# Patient Record
Sex: FEMALE | Race: WHITE | NOT HISPANIC OR LATINO | Employment: OTHER | URBAN - METROPOLITAN AREA
[De-identification: names, ages, dates, MRNs, and addresses within clinical notes are randomized per-mention and may not be internally consistent; named-entity substitution may affect disease eponyms.]

---

## 2017-03-14 ENCOUNTER — APPOINTMENT (OUTPATIENT)
Dept: GENERAL RADIOLOGY | Facility: HOSPITAL | Age: 82
End: 2017-03-14

## 2017-03-14 LAB
ALBUMIN SERPL-MCNC: 3.8 G/DL (ref 3.5–5.2)
ALBUMIN/GLOB SERPL: 1 G/DL
ALP SERPL-CCNC: 95 U/L (ref 39–117)
ALT SERPL W P-5'-P-CCNC: 10 U/L (ref 1–33)
ANION GAP SERPL CALCULATED.3IONS-SCNC: 15.1 MMOL/L
AST SERPL-CCNC: 27 U/L (ref 1–32)
BACTERIA UR QL AUTO: ABNORMAL /HPF
BASOPHILS # BLD AUTO: 0.03 10*3/MM3 (ref 0–0.2)
BASOPHILS NFR BLD AUTO: 0.3 % (ref 0–1.5)
BILIRUB SERPL-MCNC: 0.3 MG/DL (ref 0.1–1.2)
BILIRUB UR QL STRIP: NEGATIVE
BUN BLD-MCNC: 13 MG/DL (ref 8–23)
BUN/CREAT SERPL: 14.4 (ref 7–25)
CALCIUM SPEC-SCNC: 9.7 MG/DL (ref 8.2–9.6)
CHLORIDE SERPL-SCNC: 97 MMOL/L (ref 98–107)
CLARITY UR: ABNORMAL
CO2 SERPL-SCNC: 27.9 MMOL/L (ref 22–29)
COLOR UR: YELLOW
CREAT BLD-MCNC: 0.9 MG/DL (ref 0.57–1)
DEPRECATED RDW RBC AUTO: 50.9 FL (ref 37–54)
EOSINOPHIL # BLD AUTO: 0.23 10*3/MM3 (ref 0–0.7)
EOSINOPHIL NFR BLD AUTO: 2.4 % (ref 0.3–6.2)
ERYTHROCYTE [DISTWIDTH] IN BLOOD BY AUTOMATED COUNT: 14.6 % (ref 11.7–13)
GFR SERPL CREATININE-BSD FRML MDRD: 58 ML/MIN/1.73
GLOBULIN UR ELPH-MCNC: 3.8 GM/DL
GLUCOSE BLD-MCNC: 113 MG/DL (ref 65–99)
GLUCOSE UR STRIP-MCNC: NEGATIVE MG/DL
HCT VFR BLD AUTO: 36.7 % (ref 35.6–45.5)
HGB BLD-MCNC: 12.2 G/DL (ref 11.9–15.5)
HGB UR QL STRIP.AUTO: NEGATIVE
HYALINE CASTS UR QL AUTO: ABNORMAL /LPF
IMM GRANULOCYTES # BLD: 0.02 10*3/MM3 (ref 0–0.03)
IMM GRANULOCYTES NFR BLD: 0.2 % (ref 0–0.5)
KETONES UR QL STRIP: NEGATIVE
LEUKOCYTE ESTERASE UR QL STRIP.AUTO: ABNORMAL
LYMPHOCYTES # BLD AUTO: 1.94 10*3/MM3 (ref 0.9–4.8)
LYMPHOCYTES NFR BLD AUTO: 20 % (ref 19.6–45.3)
MAGNESIUM SERPL-MCNC: 1.8 MG/DL (ref 1.7–2.3)
MCH RBC QN AUTO: 31.8 PG (ref 26.9–32)
MCHC RBC AUTO-ENTMCNC: 33.2 G/DL (ref 32.4–36.3)
MCV RBC AUTO: 95.6 FL (ref 80.5–98.2)
MONOCYTES # BLD AUTO: 0.76 10*3/MM3 (ref 0.2–1.2)
MONOCYTES NFR BLD AUTO: 7.9 % (ref 5–12)
NEUTROPHILS # BLD AUTO: 6.7 10*3/MM3 (ref 1.9–8.1)
NEUTROPHILS NFR BLD AUTO: 69.2 % (ref 42.7–76)
NITRITE UR QL STRIP: NEGATIVE
PH UR STRIP.AUTO: 6 [PH] (ref 5–8)
PLATELET # BLD AUTO: 163 10*3/MM3 (ref 140–500)
PMV BLD AUTO: 9.7 FL (ref 6–12)
POTASSIUM BLD-SCNC: 3.1 MMOL/L (ref 3.5–5.2)
PROT SERPL-MCNC: 7.6 G/DL (ref 6–8.5)
PROT UR QL STRIP: NEGATIVE
RBC # BLD AUTO: 3.84 10*6/MM3 (ref 3.9–5.2)
RBC # UR: ABNORMAL /HPF
REF LAB TEST METHOD: ABNORMAL
SODIUM BLD-SCNC: 140 MMOL/L (ref 136–145)
SP GR UR STRIP: 1.02 (ref 1–1.03)
SQUAMOUS #/AREA URNS HPF: ABNORMAL /HPF
TROPONIN T SERPL-MCNC: <0.01 NG/ML (ref 0–0.03)
UROBILINOGEN UR QL STRIP: ABNORMAL
WBC NRBC COR # BLD: 9.68 10*3/MM3 (ref 4.5–10.7)
WBC UR QL AUTO: ABNORMAL /HPF

## 2017-03-14 PROCEDURE — 85025 COMPLETE CBC W/AUTO DIFF WBC: CPT | Performed by: EMERGENCY MEDICINE

## 2017-03-14 PROCEDURE — 99285 EMERGENCY DEPT VISIT HI MDM: CPT

## 2017-03-14 PROCEDURE — 93010 ELECTROCARDIOGRAM REPORT: CPT | Performed by: INTERNAL MEDICINE

## 2017-03-14 PROCEDURE — 80053 COMPREHEN METABOLIC PANEL: CPT | Performed by: EMERGENCY MEDICINE

## 2017-03-14 PROCEDURE — 84484 ASSAY OF TROPONIN QUANT: CPT | Performed by: EMERGENCY MEDICINE

## 2017-03-14 PROCEDURE — 87086 URINE CULTURE/COLONY COUNT: CPT | Performed by: EMERGENCY MEDICINE

## 2017-03-14 PROCEDURE — 93005 ELECTROCARDIOGRAM TRACING: CPT

## 2017-03-14 PROCEDURE — 81001 URINALYSIS AUTO W/SCOPE: CPT | Performed by: EMERGENCY MEDICINE

## 2017-03-14 PROCEDURE — 36415 COLL VENOUS BLD VENIPUNCTURE: CPT | Performed by: EMERGENCY MEDICINE

## 2017-03-14 PROCEDURE — 83735 ASSAY OF MAGNESIUM: CPT | Performed by: EMERGENCY MEDICINE

## 2017-03-14 PROCEDURE — 71010 HC CHEST PA OR AP: CPT

## 2017-03-14 RX ORDER — SODIUM CHLORIDE 0.9 % (FLUSH) 0.9 %
10 SYRINGE (ML) INJECTION AS NEEDED
Status: DISCONTINUED | OUTPATIENT
Start: 2017-03-14 | End: 2017-03-18 | Stop reason: HOSPADM

## 2017-03-14 RX ORDER — LATANOPROST 50 UG/ML
1 SOLUTION/ DROPS OPHTHALMIC NIGHTLY
COMMUNITY

## 2017-03-15 ENCOUNTER — APPOINTMENT (OUTPATIENT)
Dept: CARDIOLOGY | Facility: HOSPITAL | Age: 82
End: 2017-03-15
Attending: HOSPITALIST

## 2017-03-15 ENCOUNTER — HOSPITAL ENCOUNTER (INPATIENT)
Facility: HOSPITAL | Age: 82
LOS: 3 days | Discharge: NURSING, REHAB OR LONG-TERM CARE | End: 2017-03-18
Attending: EMERGENCY MEDICINE | Admitting: HOSPITALIST

## 2017-03-15 ENCOUNTER — APPOINTMENT (OUTPATIENT)
Dept: GENERAL RADIOLOGY | Facility: HOSPITAL | Age: 82
End: 2017-03-15
Attending: HOSPITALIST

## 2017-03-15 ENCOUNTER — APPOINTMENT (OUTPATIENT)
Dept: CT IMAGING | Facility: HOSPITAL | Age: 82
End: 2017-03-15

## 2017-03-15 DIAGNOSIS — R26.2 DIFFICULTY WALKING: ICD-10-CM

## 2017-03-15 DIAGNOSIS — E87.6 HYPOKALEMIA: ICD-10-CM

## 2017-03-15 DIAGNOSIS — N39.0 ACUTE UTI: ICD-10-CM

## 2017-03-15 DIAGNOSIS — G93.41 ACUTE METABOLIC ENCEPHALOPATHY: Primary | ICD-10-CM

## 2017-03-15 PROBLEM — R94.31 ST SEGMENT DEPRESSION: Status: ACTIVE | Noted: 2017-03-15

## 2017-03-15 PROBLEM — I50.32 CHRONIC DIASTOLIC HF (HEART FAILURE) (HCC): Status: ACTIVE | Noted: 2017-03-15

## 2017-03-15 PROBLEM — R07.9 CHEST PAIN: Status: ACTIVE | Noted: 2017-03-15

## 2017-03-15 PROBLEM — I10 HTN (HYPERTENSION): Status: ACTIVE | Noted: 2017-03-15

## 2017-03-15 PROBLEM — E78.5 HLD (HYPERLIPIDEMIA): Status: ACTIVE | Noted: 2017-03-15

## 2017-03-15 PROBLEM — E83.42 HYPOMAGNESEMIA: Status: ACTIVE | Noted: 2017-03-15

## 2017-03-15 LAB
ANION GAP SERPL CALCULATED.3IONS-SCNC: 14.9 MMOL/L
BASOPHILS # BLD AUTO: 0.02 10*3/MM3 (ref 0–0.2)
BASOPHILS NFR BLD AUTO: 0.2 % (ref 0–1.5)
BH CV ECHO MEAS - ACS: 0.5 CM
BH CV ECHO MEAS - AI DEC SLOPE: 513.5 CM/SEC^2
BH CV ECHO MEAS - AI MAX PG: 68.6 MMHG
BH CV ECHO MEAS - AI MAX VEL: 414 CM/SEC
BH CV ECHO MEAS - AI P1/2T: 236.1 MSEC
BH CV ECHO MEAS - AO MEAN PG (FULL): 28 MMHG
BH CV ECHO MEAS - AO MEAN PG: 31 MMHG
BH CV ECHO MEAS - AO ROOT AREA (BSA CORRECTED): 1.6
BH CV ECHO MEAS - AO ROOT AREA: 4.5 CM^2
BH CV ECHO MEAS - AO ROOT DIAM: 2.4 CM
BH CV ECHO MEAS - AO V2 MAX: 353 CM/SEC
BH CV ECHO MEAS - AO V2 MEAN: 264 CM/SEC
BH CV ECHO MEAS - AO V2 VTI: 82.5 CM
BH CV ECHO MEAS - AVA(I,A): 0.97 CM^2
BH CV ECHO MEAS - AVA(I,D): 0.97 CM^2
BH CV ECHO MEAS - BSA(HAYCOCK): 1.5 M^2
BH CV ECHO MEAS - BSA: 1.5 M^2
BH CV ECHO MEAS - BZI_BMI: 22.3 KILOGRAMS/M^2
BH CV ECHO MEAS - BZI_METRIC_HEIGHT: 152.4 CM
BH CV ECHO MEAS - BZI_METRIC_WEIGHT: 51.7 KG
BH CV ECHO MEAS - CONTRAST EF (2CH): 58.5 ML/M^2
BH CV ECHO MEAS - CONTRAST EF 4CH: 59.3 ML/M^2
BH CV ECHO MEAS - EDV(CUBED): 19.7 ML
BH CV ECHO MEAS - EDV(MOD-SP2): 41 ML
BH CV ECHO MEAS - EDV(MOD-SP4): 54 ML
BH CV ECHO MEAS - EDV(TEICH): 27 ML
BH CV ECHO MEAS - EF(CUBED): 59.4 %
BH CV ECHO MEAS - EF(MOD-SP2): 58.5 %
BH CV ECHO MEAS - EF(MOD-SP4): 59.3 %
BH CV ECHO MEAS - EF(TEICH): 52.9 %
BH CV ECHO MEAS - ESV(CUBED): 8 ML
BH CV ECHO MEAS - ESV(MOD-SP2): 17 ML
BH CV ECHO MEAS - ESV(MOD-SP4): 22 ML
BH CV ECHO MEAS - ESV(TEICH): 12.7 ML
BH CV ECHO MEAS - FS: 25.9 %
BH CV ECHO MEAS - IVS/LVPW: 1
BH CV ECHO MEAS - IVSD: 1.1 CM
BH CV ECHO MEAS - LAT PEAK E' VEL: 6 CM/SEC
BH CV ECHO MEAS - LV DIASTOLIC VOL/BSA (35-75): 36.7 ML/M^2
BH CV ECHO MEAS - LV MASS(C)D: 82.1 GRAMS
BH CV ECHO MEAS - LV MASS(C)DI: 55.9 GRAMS/M^2
BH CV ECHO MEAS - LV MEAN PG: 3 MMHG
BH CV ECHO MEAS - LV SYSTOLIC VOL/BSA (12-30): 15 ML/M^2
BH CV ECHO MEAS - LV V1 MAX: 118 CM/SEC
BH CV ECHO MEAS - LV V1 MEAN: 77.1 CM/SEC
BH CV ECHO MEAS - LV V1 VTI: 25.4 CM
BH CV ECHO MEAS - LVIDD: 2.7 CM
BH CV ECHO MEAS - LVIDS: 2 CM
BH CV ECHO MEAS - LVLD AP2: 7.3 CM
BH CV ECHO MEAS - LVLD AP4: 6.9 CM
BH CV ECHO MEAS - LVLS AP2: 6 CM
BH CV ECHO MEAS - LVLS AP4: 5.8 CM
BH CV ECHO MEAS - LVOT AREA (M): 3.1 CM^2
BH CV ECHO MEAS - LVOT AREA: 3.1 CM^2
BH CV ECHO MEAS - LVOT DIAM: 2 CM
BH CV ECHO MEAS - LVPWD: 1.1 CM
BH CV ECHO MEAS - MED PEAK E' VEL: 9 CM/SEC
BH CV ECHO MEAS - MR MAX PG: 198 MMHG
BH CV ECHO MEAS - MR MAX VEL: 703.5 CM/SEC
BH CV ECHO MEAS - MV A DUR: 0.11 SEC
BH CV ECHO MEAS - MV A MAX VEL: 149 CM/SEC
BH CV ECHO MEAS - MV DEC SLOPE: 1106 CM/SEC^2
BH CV ECHO MEAS - MV DEC TIME: 0.09 SEC
BH CV ECHO MEAS - MV E MAX VEL: 78.4 CM/SEC
BH CV ECHO MEAS - MV E/A: 0.53
BH CV ECHO MEAS - MV MEAN PG: 3 MMHG
BH CV ECHO MEAS - MV P1/2T MAX VEL: 110 CM/SEC
BH CV ECHO MEAS - MV P1/2T: 29.1 MSEC
BH CV ECHO MEAS - MV V2 MEAN: 80.7 CM/SEC
BH CV ECHO MEAS - MV V2 VTI: 25.4 CM
BH CV ECHO MEAS - MVA P1/2T LCG: 2 CM^2
BH CV ECHO MEAS - MVA(P1/2T): 7.6 CM^2
BH CV ECHO MEAS - MVA(VTI): 3.1 CM^2
BH CV ECHO MEAS - PA ACC SLOPE: 11.1 CM/SEC^2
BH CV ECHO MEAS - PA ACC TIME: 0.11 SEC
BH CV ECHO MEAS - PA MAX PG (FULL): 1.4 MMHG
BH CV ECHO MEAS - PA MAX PG: 3.6 MMHG
BH CV ECHO MEAS - PA PR(ACCEL): 30 MMHG
BH CV ECHO MEAS - PA V2 MAX: 95.3 CM/SEC
BH CV ECHO MEAS - PULM A REVS DUR: 0.09 SEC
BH CV ECHO MEAS - PULM A REVS VEL: 35.8 CM/SEC
BH CV ECHO MEAS - PULM DIAS VEL: 51.8 CM/SEC
BH CV ECHO MEAS - PULM S/D: 1.4
BH CV ECHO MEAS - PULM SYS VEL: 73.4 CM/SEC
BH CV ECHO MEAS - PVA(V,A): 1.8 CM^2
BH CV ECHO MEAS - PVA(V,D): 1.8 CM^2
BH CV ECHO MEAS - QP/QS: 0.47
BH CV ECHO MEAS - RAP SYSTOLE: 8 MMHG
BH CV ECHO MEAS - RV MAX PG: 2.2 MMHG
BH CV ECHO MEAS - RV MEAN PG: 1 MMHG
BH CV ECHO MEAS - RV V1 MAX: 74.7 CM/SEC
BH CV ECHO MEAS - RV V1 MEAN: 48.1 CM/SEC
BH CV ECHO MEAS - RV V1 VTI: 16.4 CM
BH CV ECHO MEAS - RVOT AREA: 2.3 CM^2
BH CV ECHO MEAS - RVOT DIAM: 1.7 CM
BH CV ECHO MEAS - RVSP: 68 MMHG
BH CV ECHO MEAS - SI(AO): 253.9 ML/M^2
BH CV ECHO MEAS - SI(CUBED): 7.9 ML/M^2
BH CV ECHO MEAS - SI(LVOT): 54.3 ML/M^2
BH CV ECHO MEAS - SI(MOD-SP2): 16.3 ML/M^2
BH CV ECHO MEAS - SI(MOD-SP4): 21.8 ML/M^2
BH CV ECHO MEAS - SI(TEICH): 9.7 ML/M^2
BH CV ECHO MEAS - SV(AO): 373.2 ML
BH CV ECHO MEAS - SV(CUBED): 11.7 ML
BH CV ECHO MEAS - SV(LVOT): 79.8 ML
BH CV ECHO MEAS - SV(MOD-SP2): 24 ML
BH CV ECHO MEAS - SV(MOD-SP4): 32 ML
BH CV ECHO MEAS - SV(RVOT): 37.2 ML
BH CV ECHO MEAS - SV(TEICH): 14.3 ML
BH CV ECHO MEAS - TAPSE (>1.6): 2.5 CM2
BH CV ECHO MEAS - TR MAX VEL: 388 CM/SEC
BH CV XLRA - RV BASE: 2.6 CM
BH CV XLRA - TDI S': 14 CM/SEC
BUN BLD-MCNC: 11 MG/DL (ref 8–23)
BUN/CREAT SERPL: 13.8 (ref 7–25)
CALCIUM SPEC-SCNC: 9.5 MG/DL (ref 8.2–9.6)
CHLORIDE SERPL-SCNC: 100 MMOL/L (ref 98–107)
CK MB SERPL-CCNC: 1.84 NG/ML
CK SERPL-CCNC: 55 U/L (ref 20–180)
CO2 SERPL-SCNC: 26.1 MMOL/L (ref 22–29)
CREAT BLD-MCNC: 0.8 MG/DL (ref 0.57–1)
DEPRECATED RDW RBC AUTO: 50.7 FL (ref 37–54)
E/E' RATIO: 11
EOSINOPHIL # BLD AUTO: 0.15 10*3/MM3 (ref 0–0.7)
EOSINOPHIL NFR BLD AUTO: 1.5 % (ref 0.3–6.2)
ERYTHROCYTE [DISTWIDTH] IN BLOOD BY AUTOMATED COUNT: 14.6 % (ref 11.7–13)
GFR SERPL CREATININE-BSD FRML MDRD: 67 ML/MIN/1.73
GLUCOSE BLD-MCNC: 147 MG/DL (ref 65–99)
GLUCOSE BLDC GLUCOMTR-MCNC: 113 MG/DL (ref 70–130)
GLUCOSE BLDC GLUCOMTR-MCNC: 119 MG/DL (ref 70–130)
GLUCOSE BLDC GLUCOMTR-MCNC: 128 MG/DL (ref 70–130)
GLUCOSE BLDC GLUCOMTR-MCNC: 154 MG/DL (ref 70–130)
HCT VFR BLD AUTO: 36.1 % (ref 35.6–45.5)
HGB BLD-MCNC: 12.2 G/DL (ref 11.9–15.5)
HOLD SPECIMEN: NORMAL
IMM GRANULOCYTES # BLD: 0.04 10*3/MM3 (ref 0–0.03)
IMM GRANULOCYTES NFR BLD: 0.4 % (ref 0–0.5)
LEFT ATRIUM VOLUME INDEX: 11 ML/M2
LYMPHOCYTES # BLD AUTO: 3.13 10*3/MM3 (ref 0.9–4.8)
LYMPHOCYTES NFR BLD AUTO: 30.9 % (ref 19.6–45.3)
MAGNESIUM SERPL-MCNC: 1.6 MG/DL (ref 1.7–2.3)
MCH RBC QN AUTO: 32.4 PG (ref 26.9–32)
MCHC RBC AUTO-ENTMCNC: 33.8 G/DL (ref 32.4–36.3)
MCV RBC AUTO: 96 FL (ref 80.5–98.2)
MONOCYTES # BLD AUTO: 0.77 10*3/MM3 (ref 0.2–1.2)
MONOCYTES NFR BLD AUTO: 7.6 % (ref 5–12)
NEUTROPHILS # BLD AUTO: 6.03 10*3/MM3 (ref 1.9–8.1)
NEUTROPHILS NFR BLD AUTO: 59.4 % (ref 42.7–76)
NT-PROBNP SERPL-MCNC: 574 PG/ML (ref 0–1800)
PLATELET # BLD AUTO: 136 10*3/MM3 (ref 140–500)
PMV BLD AUTO: 10 FL (ref 6–12)
POTASSIUM BLD-SCNC: 3.3 MMOL/L (ref 3.5–5.2)
POTASSIUM BLD-SCNC: 3.3 MMOL/L (ref 3.5–5.2)
RBC # BLD AUTO: 3.76 10*6/MM3 (ref 3.9–5.2)
SODIUM BLD-SCNC: 141 MMOL/L (ref 136–145)
TROPONIN T SERPL-MCNC: <0.01 NG/ML (ref 0–0.03)
TROPONIN T SERPL-MCNC: <0.01 NG/ML (ref 0–0.03)
WBC NRBC COR # BLD: 10.14 10*3/MM3 (ref 4.5–10.7)
WHOLE BLOOD HOLD SPECIMEN: NORMAL

## 2017-03-15 PROCEDURE — 71010 HC CHEST PA OR AP: CPT

## 2017-03-15 PROCEDURE — 84484 ASSAY OF TROPONIN QUANT: CPT | Performed by: HOSPITALIST

## 2017-03-15 PROCEDURE — 93005 ELECTROCARDIOGRAM TRACING: CPT | Performed by: HOSPITALIST

## 2017-03-15 PROCEDURE — 93010 ELECTROCARDIOGRAM REPORT: CPT | Performed by: INTERNAL MEDICINE

## 2017-03-15 PROCEDURE — 82553 CREATINE MB FRACTION: CPT | Performed by: HOSPITALIST

## 2017-03-15 PROCEDURE — 25010000002 HYDRALAZINE PER 20 MG: Performed by: HOSPITALIST

## 2017-03-15 PROCEDURE — 82962 GLUCOSE BLOOD TEST: CPT

## 2017-03-15 PROCEDURE — 93306 TTE W/DOPPLER COMPLETE: CPT

## 2017-03-15 PROCEDURE — 25010000002 MAGNESIUM SULFATE IN D5W 1G/100ML (PREMIX) 10-5 MG/ML-% SOLUTION: Performed by: HOSPITALIST

## 2017-03-15 PROCEDURE — 25010000003 CEFTRIAXONE PER 250 MG: Performed by: EMERGENCY MEDICINE

## 2017-03-15 PROCEDURE — 83880 ASSAY OF NATRIURETIC PEPTIDE: CPT | Performed by: HOSPITALIST

## 2017-03-15 PROCEDURE — 99222 1ST HOSP IP/OBS MODERATE 55: CPT | Performed by: INTERNAL MEDICINE

## 2017-03-15 PROCEDURE — 85025 COMPLETE CBC W/AUTO DIFF WBC: CPT | Performed by: HOSPITALIST

## 2017-03-15 PROCEDURE — 80048 BASIC METABOLIC PNL TOTAL CA: CPT | Performed by: HOSPITALIST

## 2017-03-15 PROCEDURE — 93306 TTE W/DOPPLER COMPLETE: CPT | Performed by: INTERNAL MEDICINE

## 2017-03-15 PROCEDURE — 82550 ASSAY OF CK (CPK): CPT | Performed by: HOSPITALIST

## 2017-03-15 PROCEDURE — 84132 ASSAY OF SERUM POTASSIUM: CPT | Performed by: HOSPITALIST

## 2017-03-15 PROCEDURE — 70450 CT HEAD/BRAIN W/O DYE: CPT

## 2017-03-15 PROCEDURE — 83735 ASSAY OF MAGNESIUM: CPT | Performed by: HOSPITALIST

## 2017-03-15 RX ORDER — SODIUM CHLORIDE 0.9 % (FLUSH) 0.9 %
1-10 SYRINGE (ML) INJECTION AS NEEDED
Status: DISCONTINUED | OUTPATIENT
Start: 2017-03-15 | End: 2017-03-18 | Stop reason: HOSPADM

## 2017-03-15 RX ORDER — CEFTRIAXONE SODIUM 1 G/50ML
1 INJECTION, SOLUTION INTRAVENOUS ONCE
Status: COMPLETED | OUTPATIENT
Start: 2017-03-15 | End: 2017-03-15

## 2017-03-15 RX ORDER — MORPHINE SULFATE 2 MG/ML
1 INJECTION, SOLUTION INTRAMUSCULAR; INTRAVENOUS EVERY 4 HOURS PRN
Status: DISCONTINUED | OUTPATIENT
Start: 2017-03-15 | End: 2017-03-18 | Stop reason: HOSPADM

## 2017-03-15 RX ORDER — ASPIRIN 81 MG/1
81 TABLET ORAL DAILY
Status: DISCONTINUED | OUTPATIENT
Start: 2017-03-15 | End: 2017-03-18 | Stop reason: HOSPADM

## 2017-03-15 RX ORDER — SODIUM CHLORIDE 9 MG/ML
125 INJECTION, SOLUTION INTRAVENOUS CONTINUOUS
Status: DISCONTINUED | OUTPATIENT
Start: 2017-03-15 | End: 2017-03-16

## 2017-03-15 RX ORDER — LATANOPROST 50 UG/ML
1 SOLUTION/ DROPS OPHTHALMIC NIGHTLY
Status: DISCONTINUED | OUTPATIENT
Start: 2017-03-15 | End: 2017-03-18 | Stop reason: HOSPADM

## 2017-03-15 RX ORDER — NITROGLYCERIN 0.4 MG/1
TABLET SUBLINGUAL
Status: COMPLETED
Start: 2017-03-15 | End: 2017-03-15

## 2017-03-15 RX ORDER — ONDANSETRON 2 MG/ML
4 INJECTION INTRAMUSCULAR; INTRAVENOUS EVERY 6 HOURS PRN
Status: DISCONTINUED | OUTPATIENT
Start: 2017-03-15 | End: 2017-03-18 | Stop reason: HOSPADM

## 2017-03-15 RX ORDER — POTASSIUM CHLORIDE 750 MG/1
40 CAPSULE, EXTENDED RELEASE ORAL ONCE
Status: COMPLETED | OUTPATIENT
Start: 2017-03-15 | End: 2017-03-15

## 2017-03-15 RX ORDER — ONDANSETRON 4 MG/1
4 TABLET, ORALLY DISINTEGRATING ORAL EVERY 6 HOURS PRN
Status: DISCONTINUED | OUTPATIENT
Start: 2017-03-15 | End: 2017-03-18 | Stop reason: HOSPADM

## 2017-03-15 RX ORDER — ALBUTEROL SULFATE 2.5 MG/3ML
2.5 SOLUTION RESPIRATORY (INHALATION) EVERY 6 HOURS PRN
Status: DISCONTINUED | OUTPATIENT
Start: 2017-03-15 | End: 2017-03-18 | Stop reason: HOSPADM

## 2017-03-15 RX ORDER — METOPROLOL TARTRATE 50 MG/1
50 TABLET, FILM COATED ORAL EVERY 12 HOURS SCHEDULED
Status: DISCONTINUED | OUTPATIENT
Start: 2017-03-15 | End: 2017-03-18 | Stop reason: HOSPADM

## 2017-03-15 RX ORDER — NITROGLYCERIN 0.4 MG/1
0.4 TABLET SUBLINGUAL
Status: DISCONTINUED | OUTPATIENT
Start: 2017-03-15 | End: 2017-03-18 | Stop reason: HOSPADM

## 2017-03-15 RX ORDER — ACETAMINOPHEN 325 MG/1
650 TABLET ORAL EVERY 4 HOURS PRN
Status: DISCONTINUED | OUTPATIENT
Start: 2017-03-15 | End: 2017-03-18 | Stop reason: HOSPADM

## 2017-03-15 RX ORDER — METOPROLOL TARTRATE 5 MG/5ML
INJECTION INTRAVENOUS
Status: COMPLETED
Start: 2017-03-15 | End: 2017-03-15

## 2017-03-15 RX ORDER — ONDANSETRON 4 MG/1
4 TABLET, FILM COATED ORAL EVERY 6 HOURS PRN
Status: DISCONTINUED | OUTPATIENT
Start: 2017-03-15 | End: 2017-03-18 | Stop reason: HOSPADM

## 2017-03-15 RX ORDER — ALLOPURINOL 100 MG/1
100 TABLET ORAL DAILY
Status: DISCONTINUED | OUTPATIENT
Start: 2017-03-15 | End: 2017-03-18 | Stop reason: HOSPADM

## 2017-03-15 RX ORDER — CEFTRIAXONE SODIUM 1 G/50ML
1 INJECTION, SOLUTION INTRAVENOUS EVERY 24 HOURS
Status: DISCONTINUED | OUTPATIENT
Start: 2017-03-16 | End: 2017-03-18 | Stop reason: HOSPADM

## 2017-03-15 RX ORDER — HYDRALAZINE HYDROCHLORIDE 20 MG/ML
20 INJECTION INTRAMUSCULAR; INTRAVENOUS EVERY 6 HOURS PRN
Status: DISCONTINUED | OUTPATIENT
Start: 2017-03-15 | End: 2017-03-15

## 2017-03-15 RX ORDER — POTASSIUM CHLORIDE 1.5 G/1.77G
40 POWDER, FOR SOLUTION ORAL ONCE
Status: DISCONTINUED | OUTPATIENT
Start: 2017-03-15 | End: 2017-03-18 | Stop reason: HOSPADM

## 2017-03-15 RX ADMIN — MAGNESIUM SULFATE HEPTAHYDRATE 1 G: 1 INJECTION, SOLUTION INTRAVENOUS at 11:01

## 2017-03-15 RX ADMIN — NITROGLYCERIN 1 INCH: 20 OINTMENT TOPICAL at 15:12

## 2017-03-15 RX ADMIN — HYDRALAZINE HYDROCHLORIDE 20 MG: 20 INJECTION INTRAMUSCULAR; INTRAVENOUS at 07:10

## 2017-03-15 RX ADMIN — METOPROLOL TARTRATE 50 MG: 50 TABLET ORAL at 21:58

## 2017-03-15 RX ADMIN — SODIUM CHLORIDE 500 ML: 9 INJECTION, SOLUTION INTRAVENOUS at 03:39

## 2017-03-15 RX ADMIN — METOROPROLOL TARTRATE 5 MG: 5 INJECTION, SOLUTION INTRAVENOUS at 08:18

## 2017-03-15 RX ADMIN — NITROGLYCERIN 0.4 MG: 0.4 TABLET SUBLINGUAL at 07:57

## 2017-03-15 RX ADMIN — POTASSIUM CHLORIDE 40 MEQ: 750 CAPSULE, EXTENDED RELEASE ORAL at 03:35

## 2017-03-15 RX ADMIN — CEFTRIAXONE SODIUM 1 G: 1 INJECTION, SOLUTION INTRAVENOUS at 03:39

## 2017-03-15 RX ADMIN — ASPIRIN 81 MG: 81 TABLET ORAL at 15:12

## 2017-03-15 RX ADMIN — METOPROLOL TARTRATE 25 MG: 25 TABLET ORAL at 11:45

## 2017-03-15 RX ADMIN — ALLOPURINOL 100 MG: 100 TABLET ORAL at 15:12

## 2017-03-15 RX ADMIN — NITROGLYCERIN 0.4 MG: 0.4 TABLET SUBLINGUAL at 08:03

## 2017-03-15 RX ADMIN — METOPROLOL TARTRATE 5 MG: 5 INJECTION INTRAVENOUS at 11:56

## 2017-03-15 RX ADMIN — SODIUM CHLORIDE 125 ML/HR: 9 INJECTION, SOLUTION INTRAVENOUS at 09:16

## 2017-03-15 RX ADMIN — NITROGLYCERIN 1 INCH: 20 OINTMENT TOPICAL at 21:58

## 2017-03-15 RX ADMIN — SODIUM CHLORIDE 125 ML/HR: 9 INJECTION, SOLUTION INTRAVENOUS at 03:40

## 2017-03-15 RX ADMIN — POTASSIUM CHLORIDE 40 MEQ: 750 CAPSULE, EXTENDED RELEASE ORAL at 11:12

## 2017-03-15 NOTE — ED PROVIDER NOTES
EMERGENCY DEPARTMENT ENCOUNTER    CHIEF COMPLAINT  Chief Complaint: generalized weakness  History given by: pt  History limited by: vague historian   Room Number: 22/22  PMD: Rebecca Brower MD      HPI:  Pt is a 89 y.o. female who presents complaining of generalized weakness for the past two weeks which has worsened over the past 2 days  She was seen at [facility] last week and prescribed abx for UTI which she stopped taking due to diarrhea and followed up with her PCP. Until the past two weeks, she was able to live alone, ambulate w/o assistance and was orientedx3 at baseline. Over the past few days she has had dry cough, decreased appetite and confusion. No further complaints at this time.       Duration:  Two weeks   Onset: gradual   Timing: constant   Location: generalized   Radiation: none  Quality: unable to ambulate on her own   Intensity/Severity: moderate   Progression: worsening   Associated Symptoms: cough, decreased appetite, confusion    Aggravating Factors: none  Alleviating Factors: none  Previous Episodes: none reported   Treatment before arrival: She was seen at [facility] last week and prescribed abx for UTI which she stopped taking due to diarrhea and followed up with her PCP.    PAST MEDICAL HISTORY  Active Ambulatory Problems     Diagnosis Date Noted   • Acute colitis 09/18/2016     Resolved Ambulatory Problems     Diagnosis Date Noted   • No Resolved Ambulatory Problems     Past Medical History   Diagnosis Date   • Aortic stenosis    • Arthritis    • Diverticulitis    • Glaucoma    • Hyperlipidemia    • Hypertension    • Macular degeneration        PAST SURGICAL HISTORY  Past Surgical History   Procedure Laterality Date   • Hysterectomy     • Cholecystectomy     • Appendectomy     • Colon surgery         FAMILY HISTORY  Family History   Problem Relation Age of Onset   • Cancer Mother    • Heart disease Father        SOCIAL HISTORY  Social History     Social History   • Marital status:      Spouse  name: N/A   • Number of children: N/A   • Years of education: N/A     Occupational History   • Not on file.     Social History Main Topics   • Smoking status: Never Smoker   • Smokeless tobacco: Not on file   • Alcohol use Not on file   • Drug use: Not on file   • Sexual activity: Not on file     Other Topics Concern   • Not on file     Social History Narrative       ALLERGIES  Amoxicillin and Sulfa antibiotics    REVIEW OF SYSTEMS  Review of Systems   Unable to perform ROS: Mental status change   Constitutional: Positive for appetite change (decreaased ).   Respiratory: Positive for cough.    Neurological: Positive for weakness (generalized ).   Psychiatric/Behavioral: Positive for confusion.       PHYSICAL EXAM  ED Triage Vitals   Temp Heart Rate Resp BP SpO2   03/14/17 2028 03/14/17 2028 03/14/17 2028 03/14/17 2053 03/14/17 2028   97.8 °F (36.6 °C) 73 15 112/85 88 %      Temp src Heart Rate Source Patient Position BP Location FiO2 (%)   03/14/17 2028 03/14/17 2028 03/14/17 2053 03/14/17 2053 --   Tympanic Monitor Sitting Left arm        Physical Exam   Constitutional: She is well-developed, well-nourished, and in no distress. No distress.   Hard of hearning      HENT:   Head: Normocephalic and atraumatic.   Mouth/Throat: Mucous membranes are dry.   Eyes: EOM are normal. Pupils are equal, round, and reactive to light.   Neck: Normal range of motion. Neck supple.   Cardiovascular: Normal rate, regular rhythm and normal heart sounds.    No murmur heard.  Pulmonary/Chest: Effort normal and breath sounds normal. No respiratory distress.   Dry hacky cough    Abdominal: Soft. There is no tenderness. There is no rebound and no guarding.   Musculoskeletal: Normal range of motion. She exhibits no edema.   Neurological: She is alert. She has normal sensation.   Confused to some details. Slow to answer questions. No focal neuro deficits.      Skin: Skin is warm and dry. No rash noted.   Psychiatric: Mood and affect normal.    Nursing note and vitals reviewed.      LAB RESULTS  Lab Results (last 24 hours)     Procedure Component Value Units Date/Time    CBC & Differential [[id number]] Collected:  03/14/17 2140    Specimen:  Blood Updated:  03/14/17 2205    Narrative:       The following orders were created for panel order CBC & Differential.  Procedure                               Abnormality         Status                     ---------                               -----------         ------                     CBC Auto Differential[[id number]]         Abnormal            Final result                 Please view results for these tests on the individual orders.    Comprehensive Metabolic Panel [[id number]]  (Abnormal) Collected:  03/14/17 2140    Specimen:  Blood Updated:  03/14/17 2230     Glucose 113 (H) mg/dL      BUN 13 mg/dL      Creatinine 0.90 mg/dL      Sodium 140 mmol/L      Potassium 3.1 (L) mmol/L      Chloride 97 (L) mmol/L      CO2 27.9 mmol/L      Calcium 9.7 (H) mg/dL      Total Protein 7.6 g/dL      Albumin 3.80 g/dL      ALT (SGPT) 10 U/L      AST (SGOT) 27 U/L      Alkaline Phosphatase 95 U/L      Total Bilirubin 0.3 mg/dL      eGFR Non African Amer 58 (L) mL/min/1.73      Globulin 3.8 gm/dL      A/G Ratio 1.0 g/dL      BUN/Creatinine Ratio 14.4      Anion Gap 15.1 mmol/L     Narrative:       The MDRD GFR formula is only valid for adults with stable renal function between ages 18 and 70.    Troponin [[id number]]  (Normal) Collected:  03/14/17 2140    Specimen:  Blood Updated:  03/14/17 2229     Troponin T <0.010 ng/mL     Narrative:       Troponin T Reference Ranges:  Less than 0.03 ng/mL:    Negative for AMI  0.03 to 0.09 ng/mL:      Indeterminant for AMI  Greater than 0.09 ng/mL: Positive for AMI    Magnesium [[id number]]  (Normal) Collected:  03/14/17 2140    Specimen:  Blood Updated:  03/14/17 2230     Magnesium 1.8 mg/dL     CBC Auto Differential [[id number]]  (Abnormal) Collected:  03/14/17 2140    Specimen:  Blood  Updated:  03/14/17 2205     WBC 9.68 10*3/mm3      RBC 3.84 (L) 10*6/mm3      Hemoglobin 12.2 g/dL      Hematocrit 36.7 %      MCV 95.6 fL      MCH 31.8 pg      MCHC 33.2 g/dL      RDW 14.6 (H) %      RDW-SD 50.9 fl      MPV 9.7 fL      Platelets 163 10*3/mm3      Neutrophil % 69.2 %      Lymphocyte % 20.0 %      Monocyte % 7.9 %      Eosinophil % 2.4 %      Basophil % 0.3 %      Immature Grans % 0.2 %      Neutrophils, Absolute 6.70 10*3/mm3      Lymphocytes, Absolute 1.94 10*3/mm3      Monocytes, Absolute 0.76 10*3/mm3      Eosinophils, Absolute 0.23 10*3/mm3      Basophils, Absolute 0.03 10*3/mm3      Immature Grans, Absolute 0.02 10*3/mm3     Urinalysis With / Culture If Indicated [[id number]]  (Abnormal) Collected:  03/14/17 2211    Specimen:  Urine from Urine, Clean Catch Updated:  03/14/17 2232     Color, UA Yellow      Appearance, UA Cloudy (A)      pH, UA 6.0      Specific Gravity, UA 1.019      Glucose, UA Negative      Ketones, UA Negative      Bilirubin, UA Negative      Blood, UA Negative      Protein, UA Negative      Leuk Esterase, UA Large (3+) (A)      Nitrite, UA Negative      Urobilinogen, UA 1.0 E.U./dL     Urinalysis, Microscopic Only [[id number]]  (Abnormal) Collected:  03/14/17 2211    Specimen:  Urine from Urine, Clean Catch Updated:  03/14/17 2233     RBC, UA 0-2 /HPF      WBC, UA 31-50 (A) /HPF      Bacteria, UA None Seen /HPF      Squamous Epithelial Cells, UA 0-2 /HPF      Hyaline Casts, UA 3-6 /LPF      Methodology Automated Microscopy     Urine Culture [[id number]] Collected:  03/14/17 2211    Specimen:  Urine from Urine, Clean Catch Updated:  03/14/17 2229          I ordered the above labs and reviewed the results    RADIOLOGY  XR Chest 1 View   Final Result      CT Head Without Contrast    (Results Pending)    CXR - no acute disease     I ordered the above noted radiological studies. Interpreted by radiologist. Reviewed by me in PACS.       PROCEDURES  Procedures  EKG           EKG time:  (57   Rhythm/Rate: NSR at 61  Significant artifact  Diff nonspecific ST changes       Interpreted Contemporaneously by me, independently viewed  unchanged compared to prior from 11/16/14      PROGRESS AND CONSULTS  ED Course     22:29 Ordered blood work, Troponin, UA, CXR and EKG for further evaluation.     02:53 Ordered CT Head for further evaluation. Ordered IVF bolus, Rocephin and Potassium for UTI and hypokalemia. Will attempt to ambulate pt. Placed call to [facility] for admission.     03:02 Discussed case with Dr. Burdick ([facility]) who agrees to admit pt.     03:15 Pt in CT. Discussed with pt's family plan to admit and lab results, including UA which showed UTI. They agree with course of care. Addressed all questions.       MEDICAL DECISION MAKING  Results were reviewed/discussed with the patient and they were also made aware of online access. Pt also made aware that some labs, such as cultures, will not be resulted during ER visit and follow up with PMD is necessary.     MDM  Number of Diagnoses or Management Options     Amount and/or Complexity of Data Reviewed  Clinical lab tests: ordered and reviewed (UA - UTI  Hypokalemia  - K of 3.1   WBC - 9    )  Tests in the radiology section of CPT®: ordered and reviewed (CXR - no acute disease )  Tests in the medicine section of CPT®: reviewed and ordered  Decide to obtain previous medical records or to obtain history from someone other than the patient: yes  Obtain history from someone other than the patient: yes (Pt's family )  Review and summarize past medical records: yes (She was seen on 9/20/16 for acute colitis. )  Discuss the patient with other providers: yes (D/w Dr. Burdick ([facility]))  Independent visualization of images, tracings, or specimens: yes           DIAGNOSIS  Final diagnoses:   Acute metabolic encephalopathy   Acute UTI   Hypokalemia       DISPOSITION  ADMISSION    Discussed treatment plan and reason for admission with pt/family and admitting physician.  Pt/family  voiced understanding of the plan for admission for further testing/treatment as needed.         Latest Documented Vital Signs:  As of 3:10 AM  BP- (!) 193/93 HR- 64 Temp- 97.8 °F (36.6 °C) (Tympanic) O2 sat- 94%    --  Documentation assistance provided by richard Adorno for Dr. Hammond.  Information recorded by the scribe was done at my direction and has been verified and validated by me.        Isabel Adorno  03/15/17 0356       Arjun Hammond MD  03/15/17 0356

## 2017-03-15 NOTE — PLAN OF CARE
Problem: Fall Risk (Adult)  Goal: Identify Related Risk Factors and Signs and Symptoms  Outcome: Ongoing (interventions implemented as appropriate)  Goal: Absence of Falls  Outcome: Ongoing (interventions implemented as appropriate)    Problem: Confusion, Acute (Adult)  Goal: Identify Related Risk Factors and Signs and Symptoms  Outcome: Ongoing (interventions implemented as appropriate)  Goal: Cognitive/Functional Impairments Minimized  Outcome: Ongoing (interventions implemented as appropriate)  Goal: Safety  Outcome: Ongoing (interventions implemented as appropriate)    Problem: Infection, Risk/Actual (Adult)  Goal: Identify Related Risk Factors and Signs and Symptoms  Outcome: Ongoing (interventions implemented as appropriate)  Goal: Infection Prevention/Resolution  Outcome: Ongoing (interventions implemented as appropriate)    Problem: Patient Care Overview (Adult)  Goal: Plan of Care Review  Outcome: Ongoing (interventions implemented as appropriate)    03/15/17 0637   Coping/Psychosocial Response Interventions   Plan Of Care Reviewed With patient;son   Patient Care Overview   Progress no change   Outcome Evaluation   Outcome Summary/Follow up Plan new admit from ER, will call doctor for orders, pt is alert and oriented, very pleasant, son at bedside        Goal: Adult Individualization and Mutuality  Outcome: Ongoing (interventions implemented as appropriate)  Goal: Discharge Needs Assessment  Outcome: Ongoing (interventions implemented as appropriate)

## 2017-03-15 NOTE — CONSULTS
Date of Hospital Visit: 03/15/17  Encounter Provider: Tim Poe MD  Place of Service: [facility] CARDIOLOGY  Patient Name: Isela Ewing  :[id number]  [id number]  Referral Provider: Stef Burdick MD    Chief complaint: Chest Pain    History of Present Illness:  This is a 89 year old female with hx of aortic stenosis, hyperlipidemia, and HTN who was last seen for an office visit on 5/26/15, at which time she complained of feeling fatigued. 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     She presented herself to the ER today for c/o generalized weakness and confusion for the past 2 weeks which has worsened over the past 2 days. It was noted that she was prescribed abx for a UTI last week, which she stopped taking due to diarrhea and she was to follow up with her PCP. She was still living independently prior to hospital admission. ER work up revealed hypokalemia and UTI.     Her systolic was as high as the 200s, and she was given 20 mg IV hydralazine - after she was given this she complained of chest pain, 4/10- and was given 2 nitro sublingual which did not relieve the pain. A rapid response was called. Her BP has improved to 131/73 and she is currently chest pain free. She says that she just feels very tired because she has not been able to sleep for the past 2 nights and says that she has a little bit of stomach ache.     Chest X ray pending.  Negative troponin x 1. proBNP of 574.       CT of head on 3/15/17-  The CT scan was performed as an emergency procedure through the brain  without contrast. There is prominent diffuse atrophy and chronic small  vessel ischemic change. There is no evidence of acute intracranial  hemorrhage or mass effect and the visualized sinuses are clear.    Previous Testing-  Echocardiogram on 14-      Holter Monitor Report on 14-  HOLTER MONITOR REPORT     INDICATIONS:  Dizziness.      PROCEDURE: The patient wore the Holter for 23 hours and 59 minutes with a  hookup time of 7:15 p.m. on 09/01/2014.      FINDINGS:  1. The predominant rhythm was a sinus rhythm with a minimum heart rate of 38  beats per minute, average heart rate of 57 beats per minute, and maximum heart  rate of 74 beats per minute.   2. The patient had 1 isolated premature ventricular complex noted. There was  no sustained or nonsustained ventricular tachycardia.   3. Supraventricular ectopy. The patient was noted to have 109 total  supraventricular ectopic beats at 0.1% of the total overall beats.   4. There were 3 atrial pairs and 3 episodes of atrial bigeminy. The patient  did not have any nonsustained or sustained supraventricular tachycardia.   5. There was no atrial fibrillation documented.   6. The patient's minimum heart rate was noted to be 39 beats per minute.  During that time at 11:14 a.m. she was noted to have sinus bradycardia with a  ventricular rate of 38 beats per minute. She had a junctional escape rhythm  documented for 2 beats prior to returning to a sinus bradycardia.   7. There were no pauses greater than 2 seconds documented in this rhythm.   8. No diary was returned with this tracing.     Past Medical History   Diagnosis Date   • Aortic stenosis    • Arthritis    • Diverticulitis    • Glaucoma      macular degeneration   • Hyperlipidemia    • Hypertension    • Macular degeneration        Past Surgical History   Procedure Laterality Date   • Hysterectomy     • Cholecystectomy     • Appendectomy     • Colon surgery         Prescriptions Prior to Admission   Medication Sig Dispense Refill Last Dose   • acetaminophen (TYLENOL) 325 MG tablet Take 2 tablets by mouth every 4 (four) hours as needed for mild pain (1-3).  0    • allopurinol (ZYLOPRIM) 100 MG tablet Take 100 mg by mouth daily.   9/17/2016 at Unknown time   • aspirin 81 MG EC tablet Take 81 mg by mouth daily.   9/17/2016 at Unknown time    • furosemide (LASIX) 40 MG tablet TAKE 1 TABLET EVERY DAY 90 tablet 3    • latanoprost (XALATAN) 0.005 % ophthalmic solution Administer 1 drop to both eyes Every Night.      • metoprolol tartrate (LOPRESSOR) 25 MG tablet TAKE 1 TABLET TWICE DAILY 30 tablet 11    • raloxifene (EVISTA) 60 MG tablet Take 60 mg by mouth daily.   9/17/2016 at Unknown time   • tiotropium (SPIRIVA) 18 MCG per inhalation capsule Place 1 capsule into inhaler and inhale 1 (one) time daily.   9/17/2016 at Unknown time   • amoxicillin-clavulanate (AUGMENTIN) 875-125 MG per tablet Take 1 tablet by mouth 2 (two) times a day. 14 tablet 0        Current Meds  Scheduled Meds:    allopurinol 100 mg Oral Daily   aspirin 81 mg Oral Daily   [START ON 3/16/2017] ceftriaxone 1 g Intravenous Q24H   latanoprost 1 drop Both Eyes Nightly   metoprolol tartrate 25 mg Oral Q12H   potassium chloride 40 mEq Oral Once   tiotropium 1 capsule Inhalation Daily - RT     Continuous Infusions:    sodium chloride 125 mL/hr Last Rate: Stopped (03/15/17 0922)     PRN Meds:.•  acetaminophen  •  acetaminophen  •  albuterol  •  Morphine  •  nitroglycerin  •  ondansetron **OR** ondansetron ODT **OR** ondansetron  •  sodium chloride  •  sodium chloride    Allergies as of 03/14/2017 - Eliu as Reviewed 03/14/2017   Allergen Reaction Noted   • Amoxicillin  03/14/2017   • Sulfa antibiotics  09/18/2016       Social History     Social History   • Marital status:      Spouse name: N/A   • Number of children: N/A   • Years of education: N/A     Occupational History   • Not on file.     Social History Main Topics   • Smoking status: Never Smoker   • Smokeless tobacco: Not on file   • Alcohol use No   • Drug use: No   • Sexual activity: Defer     Other Topics Concern   • Not on file     Social History Narrative       Family History   Problem Relation Age of Onset   • Cancer Mother    • Heart disease Father        REVIEW OF SYSTEMS:   ROS was performed and is negative except as  "outlined in HPI     REVIEW OF SYSTEMS:   CONSTITUTIONAL: No weight loss, fever, chills, weakness or fatigue.   HEENT: Eyes: No visual loss, blurred vision, double vision or yellow sclerae. Ears, Nose, Throat: No hearing loss, sneezing, congestion, runny nose or sore throat.   SKIN: No rash or itching.     RESPIRATORY: No shortness of breath, hemoptysis, cough or sputum.   GASTROINTESTINAL: No anorexia, nausea, vomiting or diarrhea. No abdominal pain, bright red blood per rectum or melena.  GENITOURINARY: No burning on urination, hematuria or increased frequency.  NEUROLOGICAL: No headache, dizziness, syncope, paralysis, ataxia, numbness or tingling in the extremities. No change in bowel or bladder control.   MUSCULOSKELETAL: No muscle, back pain, joint pain or stiffness.   HEMATOLOGIC: No anemia, bleeding or bruising.   LYMPHATICS: No enlarged nodes. No history of splenectomy.   PSYCHIATRIC: No history of depression, anxiety, hallucinations.   ENDOCRINOLOGIC: No reports of sweating, cold or heat intolerance. No polyuria or polydipsia.       Objective:   Temp:  [97.5 °F (36.4 °C)-97.8 °F (36.6 °C)] 97.5 °F (36.4 °C)  Heart Rate:  [] 82  Resp:  [15-24] 22  BP: (112-201)/() 119/81  Body mass index is 22.32 kg/(m^2).  Flowsheet Rows         First Filed Value    Admission Height  60\" (152.4 cm) Documented at 03/14/2017 2028    Admission Weight  119 lb (54 kg) Documented at 03/14/2017 2028        Vitals:    03/15/17 1119   BP:    Pulse:    Resp:    Temp: 97.5 °F (36.4 °C)   SpO2:        Head:    Normocephalic, without obvious abnormality, atraumatic   Eyes:            Lids and lashes normal, conjunctivae and sclerae normal, no   icterus, no pallor   Ears:    Ears appear intact with no abnormalities noted   Throat:   No oral lesions, dentition good   Neck:   No adenopathy, supple, trachea midline, no thyromegaly, no   carotid bruit, no JVD   Lungs:     Breath sounds are equal and clear to auscultation    Heart: "    Normal S1 and S2, RRR, No M/G/R   Abdomen:     Normal bowel sounds, no masses, no organomegaly, soft        non-tender, non-distended, no guarding   Extremities:   Moves all extremities well, no edema, no cyanosis, no redness   Pulses:   Pulses palpable and equal bilaterally.    Skin:  Psychiatric:   No bleeding, bruising or rash    Awake, alert and oriented x 3, normal mood and affect           EKG on 3/15/17 @ 0809-          EKG on 3/15/17 @ 0748-      I personally viewed and interpreted the patient's EKG/Telemetry data    Assessment:  Active Hospital Problems (** Indicates Principal Problem)    Diagnosis Date Noted   • **Acute metabolic encephalopathy [G93.41] 03/15/2017   • UTI (urinary tract infection) [N39.0] 03/15/2017   • Chest pain [R07.9] 03/15/2017   • ST segment depression [R94.31] 03/15/2017   • HTN (hypertension) [I10] 03/15/2017   • HLD (hyperlipidemia) [E78.5] 03/15/2017   • Chronic diastolic HF (heart failure) [I50.32] 03/15/2017   • Hypokalemia [E87.6] 03/15/2017   • Hypomagnesemia [E83.42] 03/15/2017      Resolved Hospital Problems    Diagnosis Date Noted Date Resolved   No resolved problems to display.       Plan: This is a very elderly lady with aortic stenosis. It is at least moderate-to-severe if not severe. She also appears to have sever pulmonary hypertension, normal LV function. She is in with what has been described as a UTI, but she also has a cough. She looks tired, like she might have an infection, but she has been having chest discomfort. She has some ST-T changes on her ECG that are nonspecific but concerning in the setting of her chest pain that may be angina. At this point she is only a candidate for medical therapy. She is not a candidate for surgery on her aortic valve, either percutaneously or surgically, and in the past she has been an AND. We will give her some nitrates. We will try and control her blood pressure and her heart rate and see what we can do. I talked this over  with her granddaughter who got a little bit emotional with that discussion, which is understandable.         Tim Poe MD  03/15/17  1:48 PM.

## 2017-03-15 NOTE — H&P
[facility]  [phone]    HISTORY AND PHYSICAL   [facility]        Patient Identification:  Name: Isela Ewing  Age: 89 y.o.  Sex: female  :  1924  MRN: [id number]                     Primary Care Physician: Rebecca Brower MD    Chief Complaint:  Confusion and weakness     Admission Dx:  UTI with metabolic encephalopathy    History of Present Illness:   This a 89-year-old female with a history of hypertension hyperlipidemia and diastolic heart failure who presents to the hospital with confusion and weakness.  She been seen at an urgent care center after a couple days of fevers and some urinary complaints was diagnosed with UTI.  She was started on an antibiotic facility but this was not completed secondary to GI distress and cramping.  She stopped the antibiotic but is deteriorated over the last 48 hours.  She denies any fevers over the last 48 hours the family reports that she's been increasingly confused fatigued and has had trouble completing her ADLs.  She's had trouble just walking to and from the bathroom.  She denies any abdominal pain presently but has had some abdominal pain over the last week.  Has had some loose watery stools as well.  Acute events:  Early this morning her blood pressure was high on arrival to the unit, and she was given a dose of IV hydralazine for blood pressure control.  Her initial blood pressure was 201/100 after IV hydralazine was pushed her blood pressure dropped to 155/64.  At this point she developed acute onset midline chest pain that radiated to her back and left shoulder.  It was accompanied with diaphoresis and shortness of breath.  At that point a stat response was called and I was paged.  She has no history of coronary artery disease does not have chest pain at rest or with activity at home.  She denies any cardiac issues other than heart failure.  She has seen Dr. Poe in the past.  Blood pressure on my  arrival is stable the 150s over 100s she's just been given a dose of nitroglycerin we'll monitor blood pressure closely.  She somewhat hypoxic and she's been placed on high flow nasal cannula    Past Medical History:  Past Medical History   Diagnosis Date   • Aortic stenosis    • Arthritis    • Diverticulitis    • Glaucoma      macular degeneration   • Hyperlipidemia    • Hypertension    • Macular degeneration      Past Surgical History:  Past Surgical History   Procedure Laterality Date   • Hysterectomy     • Cholecystectomy     • Appendectomy     • Colon surgery        Home Meds:  Prescriptions Prior to Admission   Medication Sig Dispense Refill Last Dose   • acetaminophen (TYLENOL) 325 MG tablet Take 2 tablets by mouth every 4 (four) hours as needed for mild pain (1-3).  0    • allopurinol (ZYLOPRIM) 100 MG tablet Take 100 mg by mouth daily.   9/17/2016 at Unknown time   • aspirin 81 MG EC tablet Take 81 mg by mouth daily.   9/17/2016 at Unknown time   • furosemide (LASIX) 40 MG tablet TAKE 1 TABLET EVERY DAY 90 tablet 3    • latanoprost (XALATAN) 0.005 % ophthalmic solution Administer 1 drop to both eyes Every Night.      • metoprolol tartrate (LOPRESSOR) 25 MG tablet TAKE 1 TABLET TWICE DAILY 30 tablet 11    • raloxifene (EVISTA) 60 MG tablet Take 60 mg by mouth daily.   9/17/2016 at Unknown time   • tiotropium (SPIRIVA) 18 MCG per inhalation capsule Place 1 capsule into inhaler and inhale 1 (one) time daily.   9/17/2016 at Unknown time   • amoxicillin-clavulanate (AUGMENTIN) 875-125 MG per tablet Take 1 tablet by mouth 2 (two) times a day. 14 tablet 0        Allergies:  Allergies   Allergen Reactions   • Amoxicillin    • Sulfa Antibiotics      Immunizations:    There is no immunization history on file for this patient.  Social History:   Social History     Social History Narrative     Social History   Substance Use Topics   • Smoking status: Never Smoker   • Smokeless tobacco: Not on file   • Alcohol use No  "    Family History:  Family History   Problem Relation Age of Onset   • Cancer Mother    • Heart disease Father         Review of Systems  Review of Systems - History obtained from the patient  General ROS: negative for - chills, fatigue, weight gain or weight loss  Psychological ROS: negative for - anxiety or depression  Ophthalmic ROS: negative for - blurry vision or double vision  ENT ROS: negative for - epistaxis or nasal discharge  Allergy and Immunology ROS: negative for - latex or seasonal allergies  Hematological and Lymphatic ROS: negative for - bleeding problems or bruising  Endocrine ROS: negative for - polydipsia/polyuria or temperature intolerance  Breast ROS: negative  Respiratory ROS: negative for - cough, shortness of breath or sputum changes  Cardiovascular ROS: Positive chest pain and dyspnea on exertion  Gastrointestinal ROS: no abdominal pain, change in bowel habits, or black or bloody stools  Genito-Urinary ROS: no dysuria, trouble voiding, or hematuria  Musculoskeletal ROS: negative for - joint pain or muscle pain  Neurological ROS: negative for - headaches, impaired coordination/balance or numbness/tingling  Dermatological ROS: negative for rash and skin lesion changes      Objective:  tMax 24 hrs: Temp (24hrs), Av.7 °F (36.5 °C), Min:97.6 °F (36.4 °C), Max:97.8 °F (36.6 °C)    Vitals Ranges:   Temp:  [97.6 °F (36.4 °C)-97.8 °F (36.6 °C)] 97.6 °F (36.4 °C)  Heart Rate:  [] 92  Resp:  [15-18] 16  BP: (112-201)/() 152/65      Exam:  Visit Vitals   • /65 (BP Location: Right arm)   • Pulse 92   • Temp 97.6 °F (36.4 °C) (Oral)   • Resp 16   • Ht 60\" (152.4 cm)   • Wt 114 lb 4.8 oz (51.8 kg)   • SpO2 92%   • BMI 22.32 kg/m2       General Appearance:    Alert, cooperative, no distress, appears stated age   Head:    Normocephalic, without obvious abnormality, atraumatic   Eyes:    PERRL, conjunctiva/corneas clear, EOM's intact, both eyes   Ears:    Normal external ear canals, " both ears   Nose:   Nares normal, septum midline, mucosa normal, no drainage    or sinus tenderness   Throat:   Lips, mucosa, and tongue normal   Neck:   Supple, symmetrical, trachea midline, no adenopathy;     thyroid:  no enlargement/tenderness/nodules; no carotid    bruit or JVD   Back:     Symmetric, no curvature, ROM normal, no CVA tenderness   Lungs:     Clear to auscultation bilaterally, respirations unlabored   Chest Wall:    No tenderness or deformity    Heart:    Regular rate and rhythm, S1 and S2 normal, no murmur, rub   or gallop   Abdomen:     Soft, non-tender, bowel sounds active all four quadrants,     no masses, no hepatomegaly, no splenomegaly   Extremities:   Extremities normal, atraumatic, no cyanosis or edema   Pulses:   2+ and symmetric all extremities   Skin:   Skin color, texture, turgor normal, no rashes or lesions   Lymph nodes:   Cervical, supraclavicular, and axillary nodes normal   Neurologic:   CNII-XII intact, normal strength, sensation intact throughout      IMAGING SUMMARY:  Imaging Results (last 72 hours)     Procedure Component Value Units Date/Time    XR Chest 1 View [[id number]] Collected:  03/14/17 2247     Updated:  03/14/17 2328    Narrative:       ONE VIEW PORTABLE CHEST     HISTORY: Cough. Weakness.     The lungs are well-expanded and clear except for a few scattered  calcified granulomas and some minimal chronic interstitial changes.  There has been complete resolution of changes of severe congestive heart  failure noted on a chest x-ray dating back to 11/19/2014. The heart  remains slightly enlarged and no acute abnormality is seen.     This report was finalized on 3/14/2017 11:25 PM by Dr. Rebel Wu MD.       CT Head Without Contrast [[id number]] Collected:  03/15/17 0401     Updated:  03/15/17 0521    Narrative:       CRANIAL CT WITHOUT CONTRAST     HISTORY: Generalized weakness. Confusion.     The CT scan was performed as an emergency procedure through the  brain  without contrast. There is prominent diffuse atrophy and chronic small  vessel ischemic change. There is no evidence of acute intracranial  hemorrhage or mass effect and the visualized sinuses are clear.     This report was finalized on 3/15/2017 5:18 AM by Dr. Rebel Wu MD.             LABS SUMMARY:    Results from last 7 days  Lab Units 03/14/17  2140   WBC 10*3/mm3 9.68   HEMOGLOBIN g/dL 12.2   PLATELETS 10*3/mm3 163     Results from last 7 days  Lab Units 03/14/17  2140   SODIUM mmol/L 140   POTASSIUM mmol/L 3.1*   CHLORIDE mmol/L 97*   TOTAL CO2 mmol/L 27.9   BUN mg/dL 13   CREATININE mg/dL 0.90   CALCIUM mg/dL 9.7*   MAGNESIUM mg/dL 1.8   Estimated Creatinine Clearance: 28.1 mL/min (by C-G formula based on Cr of 0.9).    Results from last 7 days  Lab Units 03/15/17  0758 03/15/17  0757 03/14/17  2140   CK TOTAL U/L 55  --   --    TROPONIN T ng/mL  --  <0.010 <0.010   PROBNP pg/mL  --  574.0  --        Results from last 7 days  Lab Units 03/14/17  2211   NITRITE UA  Negative   WBC UA /HPF 31-50*   BACTERIA UA /HPF None Seen   SQUAM EPITHEL UA /HPF 0-2   URINECX  Culture in progress       Assessment:  Principal Problem:    Acute metabolic encephalopathy  Active Problems:    UTI (urinary tract infection)    Chest pain    ST segment depression    HTN (hypertension)    HLD (hyperlipidemia)    Chronic diastolic HF (heart failure)    Hypokalemia    Hypomagnesemia      Plan:  As far as her admission presentation she likely has urinary tract infection that was incompletely treated in the outpatient setting based on her urinalysis.  I started her on ceftriaxone will continue this IV every 24 hours and follow urine culture.  We'll tailor antibiotics to that.  Her encephalopathy is already improved with IV fluids.  Will get physical therapy and occupational therapy involved and see how she improves over the following days.  In regards to her present acute complaint of chest pain.  She was hypertensive on  presentation to the floor and likely has some degree of underlying significant to severe aortic stenosis.  This acute drop in her blood pressure after initiation of hydralazine therapy is likely the culprit.  She does have some ST changes seen on EKG she's been given a nitrate as well as IV beta-blockade at the present time and is showing mild signs of improvement.  Her chest x-ray however does show some signs of volume overload however her blood pressures already dropped significantly and I'm hesitant to drop further with IV diuretics.  Titrated her oxygen up will monitor her fluid levels but will hold on further IV fluids at the present time.  Last echocardiogram in 2014 discharge chronic diastolic dysfunction with normal systolic function.  We'll trend her enzymes and EKGs evaluate with a new echocardiogram here in the hospital.  I've also asked Dr. Poe to assist.  I discussed with the family at length and she's presently a DNR with exceptions.  They be okay with BiPAP if needed for oxygenation and preload improvement.  Otherwise I would avoid drastic changes in her blood pressure here in the hospital we'll monitor and trend her enzymes and reevaluate throughout the day today.  Time in around 8:00 in the morning time spent in evaluation at the bedside approximately 45 minutes, multiple other evaluations throughout the day total critical care time 45 minutes.    Rosalino Stover MD  3/15/2017  7:56 AM

## 2017-03-15 NOTE — CODE DOCUMENTATION
"Plan to move patient to CCU to watch more closely. Chest pressure \"still there\". Pt states SOA. Sats reading 88% on 6liter. Increased to high flow cannula (8 liter).  "

## 2017-03-15 NOTE — ED NOTES
"No N/V/D.  Pt started having generalized weakness 10 days ago. Pt came in tonight because \"she wasn't feeling right.\"     Clementina Perez RN  03/15/17 0140    "

## 2017-03-15 NOTE — PLAN OF CARE
Problem: Fall Risk (Adult)  Goal: Identify Related Risk Factors and Signs and Symptoms  Outcome: Ongoing (interventions implemented as appropriate)  Goal: Absence of Falls  Outcome: Ongoing (interventions implemented as appropriate)    Problem: Confusion, Acute (Adult)  Goal: Identify Related Risk Factors and Signs and Symptoms  Outcome: Ongoing (interventions implemented as appropriate)  Goal: Cognitive/Functional Impairments Minimized  Outcome: Ongoing (interventions implemented as appropriate)  Goal: Safety  Outcome: Ongoing (interventions implemented as appropriate)    Problem: Infection, Risk/Actual (Adult)  Goal: Identify Related Risk Factors and Signs and Symptoms  Outcome: Ongoing (interventions implemented as appropriate)  Goal: Infection Prevention/Resolution  Outcome: Ongoing (interventions implemented as appropriate)    Problem: Patient Care Overview (Adult)  Goal: Plan of Care Review  Outcome: Ongoing (interventions implemented as appropriate)    03/15/17 6901   Coping/Psychosocial Response Interventions   Plan Of Care Reviewed With patient;son;family   Patient Care Overview   Progress progress toward functional goals as expected   Outcome Evaluation   Outcome Summary/Follow up Plan Pt. arrived to CCU at 9:00 am this morning post rapid. Prior to rapid pt's BP was 190s/70s, PRN hydralazine was given and the patient began experiencing chest pain. On arrival to unit pt. hemodynamically stable, 02 sats 95 and above on 8L high-flow. Pt. showed no signs of distress and reported she wasn't having chest pain. During the day the pt's BP increased to 160s/90s and at times the patient would say she was experiencing pressure in her left chest. Dr. Poe made aware. Pt. is alert and oriented, no complaints of pain or pressure. Continuing to monitor BP.

## 2017-03-15 NOTE — ED NOTES
Pt's family reports that the patient was very short of air today. Pt is reported to not have been eating well for the past week. Pt has been weaker than normal per family. Pt is described as being too weak to stand or ambulate independently, whereas pt is generally able to ambulate independently without any assistive device. Pt was treated for a UTI about 10 days ago with amoxicillin which made the patient start having diarrhea. Pt stopped taking the abx and followed up with her PMD.      Hardeep Britt RN  03/14/17 1550

## 2017-03-16 LAB
ANION GAP SERPL CALCULATED.3IONS-SCNC: 12.6 MMOL/L
B PERT DNA SPEC QL NAA+PROBE: NOT DETECTED
BACTERIA SPEC AEROBE CULT: NO GROWTH
BASOPHILS # BLD AUTO: 0.02 10*3/MM3 (ref 0–0.2)
BASOPHILS NFR BLD AUTO: 0.2 % (ref 0–1.5)
BUN BLD-MCNC: 11 MG/DL (ref 8–23)
BUN/CREAT SERPL: 11.5 (ref 7–25)
C PNEUM DNA NPH QL NAA+NON-PROBE: NOT DETECTED
CALCIUM SPEC-SCNC: 9.2 MG/DL (ref 8.2–9.6)
CHLORIDE SERPL-SCNC: 102 MMOL/L (ref 98–107)
CO2 SERPL-SCNC: 26.4 MMOL/L (ref 22–29)
CREAT BLD-MCNC: 0.96 MG/DL (ref 0.57–1)
DEPRECATED RDW RBC AUTO: 53.4 FL (ref 37–54)
EOSINOPHIL # BLD AUTO: 0.14 10*3/MM3 (ref 0–0.7)
EOSINOPHIL NFR BLD AUTO: 1.7 % (ref 0.3–6.2)
ERYTHROCYTE [DISTWIDTH] IN BLOOD BY AUTOMATED COUNT: 14.8 % (ref 11.7–13)
FLUAV H1 2009 PAND RNA NPH QL NAA+PROBE: NOT DETECTED
FLUAV H1 HA GENE NPH QL NAA+PROBE: NOT DETECTED
FLUAV H3 RNA NPH QL NAA+PROBE: NOT DETECTED
FLUAV SUBTYP SPEC NAA+PROBE: NOT DETECTED
FLUBV RNA ISLT QL NAA+PROBE: NOT DETECTED
GFR SERPL CREATININE-BSD FRML MDRD: 54 ML/MIN/1.73
GLUCOSE BLD-MCNC: 114 MG/DL (ref 65–99)
HADV DNA SPEC NAA+PROBE: NOT DETECTED
HCOV 229E RNA SPEC QL NAA+PROBE: NOT DETECTED
HCOV HKU1 RNA SPEC QL NAA+PROBE: NOT DETECTED
HCOV NL63 RNA SPEC QL NAA+PROBE: NOT DETECTED
HCOV OC43 RNA SPEC QL NAA+PROBE: NOT DETECTED
HCT VFR BLD AUTO: 32.3 % (ref 35.6–45.5)
HGB BLD-MCNC: 10.6 G/DL (ref 11.9–15.5)
HMPV RNA NPH QL NAA+NON-PROBE: NOT DETECTED
HPIV1 RNA SPEC QL NAA+PROBE: NOT DETECTED
HPIV2 RNA SPEC QL NAA+PROBE: NOT DETECTED
HPIV3 RNA NPH QL NAA+PROBE: NOT DETECTED
HPIV4 P GENE NPH QL NAA+PROBE: NOT DETECTED
IMM GRANULOCYTES # BLD: 0 10*3/MM3 (ref 0–0.03)
IMM GRANULOCYTES NFR BLD: 0 % (ref 0–0.5)
LYMPHOCYTES # BLD AUTO: 2.18 10*3/MM3 (ref 0.9–4.8)
LYMPHOCYTES NFR BLD AUTO: 26.2 % (ref 19.6–45.3)
M PNEUMO IGG SER IA-ACNC: NOT DETECTED
MAGNESIUM SERPL-MCNC: 2.1 MG/DL (ref 1.7–2.3)
MCH RBC QN AUTO: 32.5 PG (ref 26.9–32)
MCHC RBC AUTO-ENTMCNC: 32.8 G/DL (ref 32.4–36.3)
MCV RBC AUTO: 99.1 FL (ref 80.5–98.2)
MONOCYTES # BLD AUTO: 0.76 10*3/MM3 (ref 0.2–1.2)
MONOCYTES NFR BLD AUTO: 9.1 % (ref 5–12)
NEUTROPHILS # BLD AUTO: 5.21 10*3/MM3 (ref 1.9–8.1)
NEUTROPHILS NFR BLD AUTO: 62.8 % (ref 42.7–76)
NT-PROBNP SERPL-MCNC: 1485 PG/ML (ref 0–1800)
PHOSPHATE SERPL-MCNC: 3.6 MG/DL (ref 2.5–4.5)
PLATELET # BLD AUTO: 132 10*3/MM3 (ref 140–500)
PMV BLD AUTO: 10 FL (ref 6–12)
POTASSIUM BLD-SCNC: 4.2 MMOL/L (ref 3.5–5.2)
RBC # BLD AUTO: 3.26 10*6/MM3 (ref 3.9–5.2)
RHINOVIRUS RNA SPEC NAA+PROBE: NOT DETECTED
RSV RNA NPH QL NAA+NON-PROBE: NOT DETECTED
SODIUM BLD-SCNC: 141 MMOL/L (ref 136–145)
T4 FREE SERPL-MCNC: 1.46 NG/DL (ref 0.93–1.7)
TROPONIN T SERPL-MCNC: <0.01 NG/ML (ref 0–0.03)
TSH SERPL DL<=0.05 MIU/L-ACNC: 1.67 MIU/ML (ref 0.27–4.2)
WBC NRBC COR # BLD: 8.31 10*3/MM3 (ref 4.5–10.7)

## 2017-03-16 PROCEDURE — 80048 BASIC METABOLIC PNL TOTAL CA: CPT | Performed by: HOSPITALIST

## 2017-03-16 PROCEDURE — 87486 CHLMYD PNEUM DNA AMP PROBE: CPT | Performed by: HOSPITALIST

## 2017-03-16 PROCEDURE — 25010000003 CEFTRIAXONE PER 250 MG: Performed by: HOSPITALIST

## 2017-03-16 PROCEDURE — 83880 ASSAY OF NATRIURETIC PEPTIDE: CPT | Performed by: HOSPITALIST

## 2017-03-16 PROCEDURE — 84484 ASSAY OF TROPONIN QUANT: CPT | Performed by: HOSPITALIST

## 2017-03-16 PROCEDURE — 84100 ASSAY OF PHOSPHORUS: CPT | Performed by: HOSPITALIST

## 2017-03-16 PROCEDURE — 97110 THERAPEUTIC EXERCISES: CPT

## 2017-03-16 PROCEDURE — 87581 M.PNEUMON DNA AMP PROBE: CPT | Performed by: HOSPITALIST

## 2017-03-16 PROCEDURE — 83735 ASSAY OF MAGNESIUM: CPT | Performed by: HOSPITALIST

## 2017-03-16 PROCEDURE — 84439 ASSAY OF FREE THYROXINE: CPT | Performed by: HOSPITALIST

## 2017-03-16 PROCEDURE — 99232 SBSQ HOSP IP/OBS MODERATE 35: CPT | Performed by: INTERNAL MEDICINE

## 2017-03-16 PROCEDURE — 84443 ASSAY THYROID STIM HORMONE: CPT | Performed by: HOSPITALIST

## 2017-03-16 PROCEDURE — 87633 RESP VIRUS 12-25 TARGETS: CPT | Performed by: HOSPITALIST

## 2017-03-16 PROCEDURE — 97161 PT EVAL LOW COMPLEX 20 MIN: CPT

## 2017-03-16 PROCEDURE — 93010 ELECTROCARDIOGRAM REPORT: CPT | Performed by: INTERNAL MEDICINE

## 2017-03-16 PROCEDURE — 87798 DETECT AGENT NOS DNA AMP: CPT | Performed by: HOSPITALIST

## 2017-03-16 PROCEDURE — 93005 ELECTROCARDIOGRAM TRACING: CPT | Performed by: INTERNAL MEDICINE

## 2017-03-16 PROCEDURE — 85025 COMPLETE CBC W/AUTO DIFF WBC: CPT | Performed by: HOSPITALIST

## 2017-03-16 RX ORDER — ISOSORBIDE MONONITRATE 30 MG/1
30 TABLET, EXTENDED RELEASE ORAL
Status: DISCONTINUED | OUTPATIENT
Start: 2017-03-16 | End: 2017-03-18 | Stop reason: HOSPADM

## 2017-03-16 RX ORDER — AMLODIPINE BESYLATE 2.5 MG/1
2.5 TABLET ORAL DAILY
Status: DISCONTINUED | OUTPATIENT
Start: 2017-03-16 | End: 2017-03-18 | Stop reason: HOSPADM

## 2017-03-16 RX ADMIN — METOPROLOL TARTRATE 50 MG: 50 TABLET ORAL at 20:33

## 2017-03-16 RX ADMIN — CEFTRIAXONE SODIUM 1 G: 1 INJECTION, SOLUTION INTRAVENOUS at 03:24

## 2017-03-16 RX ADMIN — NITROGLYCERIN 1 INCH: 20 OINTMENT TOPICAL at 06:01

## 2017-03-16 RX ADMIN — LATANOPROST 1 DROP: 50 SOLUTION OPHTHALMIC at 03:23

## 2017-03-16 RX ADMIN — AMLODIPINE BESYLATE 2.5 MG: 2.5 TABLET ORAL at 11:02

## 2017-03-16 RX ADMIN — ASPIRIN 81 MG: 81 TABLET ORAL at 08:43

## 2017-03-16 RX ADMIN — ISOSORBIDE MONONITRATE 30 MG: 30 TABLET, EXTENDED RELEASE ORAL at 11:02

## 2017-03-16 RX ADMIN — ALLOPURINOL 100 MG: 100 TABLET ORAL at 08:43

## 2017-03-16 RX ADMIN — METOPROLOL TARTRATE 50 MG: 50 TABLET ORAL at 08:43

## 2017-03-16 RX ADMIN — ACETAMINOPHEN 650 MG: 325 TABLET ORAL at 00:00

## 2017-03-16 RX ADMIN — LATANOPROST 1 DROP: 50 SOLUTION OPHTHALMIC at 20:33

## 2017-03-16 NOTE — PLAN OF CARE
Problem: Inpatient Physical Therapy  Goal: Bed Mobility Goal LTG- PT    03/16/17 1448   Bed Mobility PT LTG   Bed Mobility PT LTG, Date Established 03/16/17   Bed Mobility PT LTG, Time to Achieve 1 wk   Bed Mobility PT LTG, Activity Type all bed mobility   Bed Mobility PT LTG, Kay[place]e Level conditional independence       Goal: Transfer Training Goal 1 LTG- PT    03/16/17 1448   Transfer Training PT LTG   Transfer Training PT LTG, Date Established 03/16/17   Transfer Training PT LTG, Time to Achieve 1 wk   Transfer Training PT LTG, Activity Type all transfers   Transfer Training PT LTG, [place]e Level conditional independence       Goal: Gait Training Goal LTG- PT    03/16/17 1448   Gait Training PT LTG   Gait Training Goal PT LTG, Date Established 03/16/17   Gait Training Goal PT LTG, Time to Achieve 1 wk   Gait Training Goal PT LTG, Kaycee Level supervision required;conditional independence   Gait Training Goal PT LTG, Assist Device (with or without device)   Gait Training Goal PT LTG, Distance to Achieve 160

## 2017-03-16 NOTE — PROGRESS NOTES
"Isela Ewing  1/14/1924 89 y.o.  [id number]      Patient Care Team:  Rebecca Brower MD as PCP - General (Internal Medicine)    CC: Chest pain    Interval History: She is doing better today no further angina      Objective   Vital Signs  Temp:  [97.3 °F (36.3 °C)-97.9 °F (36.6 °C)] 97.4 °F (36.3 °C)  Heart Rate:  [58-82] 58  Resp:  [16-18] 16  BP: (119-176)/() 125/58    Intake/Output Summary (Last 24 hours) at 03/16/17 0839  Last data filed at 03/15/17 2252   Gross per 24 hour   Intake 471.67 ml   Output    200 ml   Net 271.67 ml     Flowsheet Rows         First Filed Value    Admission Height  60\" (152.4 cm) Documented at 03/14/2017 2028    Admission Weight  119 lb (54 kg) Documented at 03/14/2017 2028          Physical Exam:   General Appearance:    Alert,oriented, in no acute distress   Lungs:     Clear to auscultation,BS are equal    Heart:    Normal S1 and S2, RRR with iii/vi dane murmur, gallop or rub   HEENT:    Sclera are clear, no JVD or adenopathy   Abdomen:     Normal bowel sounds, soft non-tender, non-distended, no HSM   Extremities:   Moves all extremities well, no edema, no cyanosis, no             Redness, no rash     Medication Review:        allopurinol 100 mg Oral Daily   amLODIPine 2.5 mg Oral Daily   aspirin 81 mg Oral Daily   ceftriaxone 1 g Intravenous Q24H   isosorbide mononitrate 30 mg Oral Q24H   latanoprost 1 drop Both Eyes Nightly   metoprolol tartrate 50 mg Oral Q12H   potassium chloride 40 mEq Oral Once   tiotropium 1 capsule Inhalation Daily - RT       sodium chloride 125 mL/hr Last Rate: Stopped (03/15/17 0922)         I reviewed the patient's new clinical results.  I personally viewed and interpreted the patient's EKG/Telemetry data    Assessment/Plan  Active Hospital Problems (** Indicates Principal Problem)    Diagnosis Date Noted   • **Acute metabolic encephalopathy [G93.41] 03/15/2017   • UTI (urinary tract infection) [N39.0] 03/15/2017   • Chest pain [R07.9] 03/15/2017 "   • ST segment depression [R94.31] 03/15/2017   • HTN (hypertension) [I10] 03/15/2017   • HLD (hyperlipidemia) [E78.5] 03/15/2017   • Chronic diastolic HF (heart failure) [I50.32] 03/15/2017   • Hypokalemia [E87.6] 03/15/2017   • Hypomagnesemia [E83.42] 03/15/2017      Resolved Hospital Problems    Diagnosis Date Noted Date Resolved   No resolved problems to display.       She's doing better today her EKG is back at baseline she's not having angina blood pressures a little bit improved dental work on treatment of that switch her medicines around within a be conservative she's all a candidate for medical therapy she has moderate to severe aortic stenosis normal LV function    Tim Poe MD  03/16/17  8:39 AM

## 2017-03-16 NOTE — PROGRESS NOTES
Acute Care - Physical Therapy Initial Evaluation  [facility]     Patient Name: Isela Ewing  : 1924  MRN: [id number]  Today's Date: 3/16/2017   Onset of Illness/Injury or Date of Surgery Date: 03/15/17  Date of Referral to PT: 17         Admit Date: 3/15/2017     Visit Dx:    ICD-10-CM ICD-9-CM   1. Acute metabolic encephalopathy G93.41 348.31   2. Acute UTI N39.0 599.0   3. Hypokalemia E87.6 276.8   4. Difficulty walking R26.2 719.7     Patient Active Problem List   Diagnosis   • Acute colitis   • Acute metabolic encephalopathy   • UTI (urinary tract infection)   • Chest pain   • ST segment depression   • HTN (hypertension)   • HLD (hyperlipidemia)   • Chronic diastolic HF (heart failure)   • Hypokalemia   • Hypomagnesemia     Past Medical History   Diagnosis Date   • Aortic stenosis    • Arthritis    • Diverticulitis    • Glaucoma      macular degeneration   • Hyperlipidemia    • Hypertension    • Macular degeneration      Past Surgical History   Procedure Laterality Date   • Hysterectomy     • Cholecystectomy     • Appendectomy     • Colon surgery            PT ASSESSMENT (last 72 hours)      PT Evaluation       17 1433 03/15/17 0635    Rehab Evaluation    Document Type evaluation  -EF     Subjective Information agree to therapy;complains of;weakness   coughing alot  -EF     Patient Effort, Rehab Treatment good  -EF     Symptoms Noted During/After Treatment --   increased coughing  -EF     General Information    Patient Profile Review yes  -EF     Onset of Illness/Injury or Date of Surgery Date 03/15/17  -EF     General Observations Elderly female supine in bed, O2 at 2L  -EF     Pertinent History Of Current Problem Pt admitted with SOA, weakness.  -EF     Precautions/Limitations fall precautions;oxygen therapy device and L/min   pt is being tested for flu  -EF     Prior Level of Function independent:  -EF     Equipment Currently Used at Home none  -EF     Plans/Goals Discussed With  patient;agreed upon  -EF     Barriers to Rehab medically complex  -EF     Living Environment    Lives With  alone  -EC    Living Arrangements  house  -EC    Home Accessibility  no concerns  -EC    Stair Railings at Home  other (see comments)   basement   -EC    Type of Financial/Environmental Concern  none  -EC    Transportation Available  family or friend will provide  -EC    Clinical Impression    Date of Referral to PT 03/16/17  -EF     Patient/Family Goals Statement to go home if possible  -EF     Criteria for Skilled Therapeutic Interventions Met yes  -EF     Impairments Found (describe specific impairments) aerobic capacity/endurance;gait, locomotion, and balance;ventilation and respiration/gas exchange  -EF     Rehab Potential good, to achieve stated therapy goals  -EF     Vital Signs    Pre SpO2 (%) 96  -EF     O2 Delivery Pre Treatment supplemental O2   3L  -EF     O2 Delivery Intra Treatment supplemental O2  -EF     Post SpO2 (%) 94  -EF     O2 Delivery Post Treatment supplemental O2  -EF     Pre Patient Position Supine  -EF     Intra Patient Position Standing  -EF     Post Patient Position Supine  -EF     Pain Assessment    Pain Assessment No/denies pain  -EF     Vision Assessment/Intervention    Visual Impairment --   wears glasses but has macular degeneration  -EF     Cognitive Assessment/Intervention    Current Cognitive/Communication Assessment functional  -EF     Orientation Status oriented x 4  -EF     Follows Commands/Answers Questions 100% of the time  -EF     Personal Safety good awareness, safety precautions  -EF     Personal Safety Interventions gait belt;fall prevention program maintained;muscle strengthening facilitated;nonskid shoes/slippers when out of bed  -EF     ROM (Range of Motion)    General ROM no range of motion deficits identified  -EF     MMT (Manual Muscle Testing)    General MMT Assessment no strength deficits identified   strength grossly >3/5 throughout  -EF     Bed Mobility,  Assessment/Treatment    Bed Mob, Supine to Sit, Esmeralda verbal cues required;supervision required  -EF     Bed Mob, Sit to Supine, Esmeralda verbal cues required;supervision required  -EF     Bed Mobility, Comment increased coughing with movement  -EF     Transfer Assessment/Treatment    Transfers, Sit-Stand Esmeralda verbal cues required;contact guard assist  -EF     Transfers, Stand-Sit Esmeralda verbal cues required;contact guard assist  -EF     Transfers, Sit-Stand-Sit, Assist Device rolling walker  -EF     Transfer, Impairments impaired balance  -EF     Gait Assessment/Treatment    Gait, Esmeralda Level contact guard assist  -EF     Gait, Assistive Device rolling walker   O2 at 3L  -EF     Gait, Distance (Feet) 10  -EF     Gait, Gait Deviations tony decreased;forward flexed posture  -EF     Gait, Safety Issues weight-shifting ability decreased;step length decreased  -EF     Gait, Impairments impaired balance  -EF     Gait, Comment increased coughing with activity  -EF     Therapy Exercises    Bilateral Lower Extremities AROM:;10 reps;ankle pumps/circles;LAQ  -EF     Positioning and Restraints    Pre-Treatment Position in bed  -EF     Post Treatment Position bed  -EF     In Bed notified nsg;supine;call light within reach;encouraged to call for assist;exit alarm on;SCD pump applied  -EF       03/15/17 0633       General Information    Equipment Currently Used at Home none  -EC       User Key  (r) = Recorded By, (t) = Taken By, (c) = Cosigned By    Initials Name Provider Type    CHRISTO Romero, PT Physical Therapist    EC Nadja Romo, NICCI Registered Nurse          Physical Therapy Education     Title: PT OT SLP Therapies (Done)     Topic: Physical Therapy (Done)     Point: Mobility training (Done)    Learning Progress Summary    Learner Readiness Method Response Comment Documented by Status   Patient Acceptance BETSY FERREIRA  EF 03/16/17 9650 Done               Point: Home exercise program  (Done)    Learning Progress Summary    Learner Readiness Method Response Comment Documented by Status   Patient Acceptance E VU,NR  EF 03/16/17 1448 Done               Point: Body mechanics (Done)    Learning Progress Summary    Learner Readiness Method Response Comment Documented by Status   Patient Acceptance E VU,NR  EF 03/16/17 1448 Done               Point: Precautions (Done)    Learning Progress Summary    Learner Readiness Method Response Comment Documented by Status   Patient Acceptance E VU,NR  EF 03/16/17 1448 Done                      User Key     Initials Effective Dates Name Provider Type Discipline     04/24/15 -  Isela Romero PT Physical Therapist PT                PT Recommendation and Plan  Anticipated Discharge Disposition: home, skilled nursing facility (depending on progress)  Planned Therapy Interventions: bed mobility training, gait training, home exercise program, patient/family education, transfer training  Plan of Care Review  Plan Of Care Reviewed With: patient  Outcome Summary/Follow up Plan: Pt presents with decreased endurance & mobility. During evaluation, movement & activity caused increased coughing. Pt should progress with mobility as she recovers & will benefit from skilled PT to faciltate improved mobility & safety.          IP PT Goals       03/16/17 1448          Bed Mobility PT LTG    Bed Mobility PT LTG, Date Established 03/16/17  -EF      Bed Mobility PT LTG, Time to Achieve 1 wk  -EF      Bed Mobility PT LTG, Activity Type all bed mobility  -EF      Bed Mobility PT LTG, [place] Level conditional independence  -EF      Transfer Training PT LTG    Transfer Training PT LTG, Date Established 03/16/17  -EF      Transfer Training PT LTG, Time to Achieve 1 wk  -EF      Transfer Training PT LTG, Activity Type all transfers  -EF      Transfer Training PT LTG, [place] Level conditional independence  -EF      Gait Training PT LTG    Gait Training Goal PT LTG, Date  Established 03/16/17  -EF      Gait Training Goal PT LTG, Time to Achieve 1 wk  -EF      Gait Training Goal PT LTG, [place] Level supervision required;conditional independence  -EF      Gait Training Goal PT LTG, Assist Device --   with or without device  -EF      Gait Training Goal PT LTG, Distance to Achieve 160  -EF        User Key  (r) = Recorded By, (t) = Taken By, (c) = Cosigned By    Initials Name Provider Type    CHRISTO Romero PT Physical Therapist                Outcome Measures       03/16/17 1451          How much help from another person do you currently need...    Turning from your back to your side while in flat bed without using bedrails? 3  -EF      Moving from lying on back to sitting on the side of a flat bed without bedrails? 3  -EF      Moving to and from a bed to a chair (including a wheelchair)? 3  -EF      Standing up from a chair using your arms (e.g., wheelchair, bedside chair)? 3  -EF      Climbing 3-5 steps with a railing? 2  -EF      To walk in hospital room? 3  -EF      AM-PAC 6 Clicks Score 17  -EF      Functional Assessment    Outcome Measure Options AM-PAC 6 Clicks Basic Mobility (PT)  -EF        User Key  (r) = Recorded By, (t) = Taken By, (c) = Cosigned By    Initials Name Provider Type    CHRISTO Romero PT Physical Therapist           Time Calculation:         PT Charges       03/16/17 1455          Time Calculation    Start Time 1316  -EF      Stop Time 1332  -EF      Time Calculation (min) 16 min  -EF      PT Received On 03/16/17  -EF      PT - Next Appointment 03/17/17  -EF      PT Goal Re-Cert Due Date 03/23/17  -EF        User Key  (r) = Recorded By, (t) = Taken By, (c) = Cosigned By    Initials Name Provider Type    CHRISTO Romero PT Physical Therapist          Therapy Charges for Today     Code Description Service Date Service Provider Modifiers Qty    19968380353 HC PT EVAL LOW COMPLEXITY 2 3/16/2017 Isela Romero PT GP 1    03120932935 HC PT  THER PROC EA 15 MIN 3/16/2017 Isela Romero, PT GP 1          PT G-Codes  Outcome Measure Options: AM-PAC 6 Clicks Basic Mobility (PT)      Isela Romero, PT  3/16/2017

## 2017-03-16 NOTE — PLAN OF CARE
Problem: Patient Care Overview (Adult)  Goal: Plan of Care Review    03/16/17 1449   Coping/Psychosocial Response Interventions   Plan Of Care Reviewed With patient   Outcome Evaluation   Outcome Summary/Follow up Plan Pt presents with decreased endurance & mobility. During evaluation, movement & activity caused increased coughing. Pt should progress with mobility as she recovers & will benefit from skilled PT to faciltate improved mobility & safety.

## 2017-03-16 NOTE — PLAN OF CARE
Problem: Fall Risk (Adult)  Goal: Identify Related Risk Factors and Signs and Symptoms  Outcome: Ongoing (interventions implemented as appropriate)  Goal: Absence of Falls  Outcome: Ongoing (interventions implemented as appropriate)    Problem: Confusion, Acute (Adult)  Goal: Identify Related Risk Factors and Signs and Symptoms  Outcome: Ongoing (interventions implemented as appropriate)  Goal: Cognitive/Functional Impairments Minimized  Outcome: Ongoing (interventions implemented as appropriate)  Goal: Safety  Outcome: Ongoing (interventions implemented as appropriate)    Problem: Infection, Risk/Actual (Adult)  Goal: Identify Related Risk Factors and Signs and Symptoms  Outcome: Ongoing (interventions implemented as appropriate)  Goal: Infection Prevention/Resolution  Outcome: Ongoing (interventions implemented as appropriate)    Problem: Patient Care Overview (Adult)  Goal: Plan of Care Review  Outcome: Ongoing (interventions implemented as appropriate)    03/16/17 0401   Coping/Psychosocial Response Interventions   Plan Of Care Reviewed With patient   Patient Care Overview   Progress no change   Outcome Evaluation   Outcome Summary/Follow up Plan Patient transfered from CCU last night. No complaints thoroughout night.        Goal: Adult Individualization and Mutuality  Outcome: Ongoing (interventions implemented as appropriate)  Goal: Discharge Needs Assessment  Outcome: Ongoing (interventions implemented as appropriate)

## 2017-03-16 NOTE — PROGRESS NOTES
LOS: 1 day     Name: Isela Ewing  Age/Sex: 89 y.o. female  :  1924        PCP: Rebecca Brower MD    Subjective   She's feeling much better today denies any chest pain at the present time.  Denies any dysuria or hematuria.  Against feeling much better compared to yesterday and no issues overnight and rested well only issue is she does complain of some cough and upper respiratory symptoms that she's had for about a week.  General: No Fever or Chills, Cardiac: No Chest Pain or Palpitations, Resp: No Cough or SOA, GI: No Nausea, Vomiting, or Diarrhea and Other: No bleeding      allopurinol 100 mg Oral Daily   amLODIPine 2.5 mg Oral Daily   aspirin 81 mg Oral Daily   ceftriaxone 1 g Intravenous Q24H   isosorbide mononitrate 30 mg Oral Q24H   latanoprost 1 drop Both Eyes Nightly   metoprolol tartrate 50 mg Oral Q12H   potassium chloride 40 mEq Oral Once   tiotropium 1 capsule Inhalation Daily - RT       sodium chloride 125 mL/hr Last Rate: Stopped (03/15/17 09)       Objective   Vital Signs  Temp:  [97.3 °F (36.3 °C)-97.9 °F (36.6 °C)] 97.6 °F (36.4 °C)  Heart Rate:  [] 77  Resp:  [16-24] 16  BP: (119-176)/() 158/60  Body mass index is 22.52 kg/(m^2).    Intake/Output Summary (Last 24 hours) at 17 0700  Last data filed at 03/15/17 2252   Gross per 24 hour   Intake 471.67 ml   Output    200 ml   Net 271.67 ml       General:  Resting comfortably no active distress  Eyes:  PERRL; no conj pallor; EOMI; no scleral icterus  ENT:  oral mucosa moist; pharynx w/o exudate; ext inspect WNL  Neck: nl movement; no JVD; no adenopathy; thyroid WNL  Lungs:  CTA; good air entry; Non labored; No wheeze, No Crackle, No Rhonchi  Cardiac:  RRR; faint murmurs;  nl S1/S2  Abd:  soft; non tender; non distended; no HSM; no masses; BS +  : deferred  Musc/Skel: no arthropathy, or deformity   Skin:  warm and perfused; no apparent rash on examined areas  Neuro: CN grossly intact; no focal deficit of  strength or sensory   Ext/P Vasc:  peripheral pulses 2+; no clubbing ; no cyanosis; no edema  MS & Psychiatric: A&O x  3; memory intact; affect/mood appropriate    Results Review:       I reviewed the patient's new clinical results.    Results from last 7 days  Lab Units 03/16/17  0354 03/15/17  0758 03/14/17  2140   WBC 10*3/mm3 8.31 10.14 9.68   HEMOGLOBIN g/dL 10.6* 12.2 12.2   PLATELETS 10*3/mm3 132* 136* 163     Results from last 7 days  Lab Units 03/16/17  0354 03/15/17  0757 03/14/17  2140   SODIUM mmol/L 141 141 140   POTASSIUM mmol/L 4.2 3.3*  3.3* 3.1*   CHLORIDE mmol/L 102 100 97*   TOTAL CO2 mmol/L 26.4 26.1 27.9   BUN mg/dL 11 11 13   CREATININE mg/dL 0.96 0.80 0.90   CALCIUM mg/dL 9.2 9.5 9.7*   MAGNESIUM mg/dL 2.1 1.6* 1.8   PHOSPHORUS mg/dL 3.6  --   --    Estimated Creatinine Clearance: 26.3 mL/min (by C-G formula based on Cr of 0.96).    Results from last 7 days  Lab Units 03/16/17  0354 03/15/17  1523 03/15/17  0758 03/15/17  0757   CK TOTAL U/L  --   --  55  --    TROPONIN T ng/mL <0.010 <0.010  --  <0.010   PROBNP pg/mL 1485.0  --   --  574.0       Results from last 7 days  Lab Units 03/14/17  2211   NITRITE UA  Negative   WBC UA /HPF 31-50*   BACTERIA UA /HPF None Seen   SQUAM EPITHEL UA /HPF 0-2   URINECX  No growth       Assessment/Plan   Principal Problem:    Acute metabolic encephalopathy  Active Problems:    UTI (urinary tract infection)    Chest pain    ST segment depression    HTN (hypertension)    HLD (hyperlipidemia)    Chronic diastolic HF (heart failure)    Hypokalemia    Hypomagnesemia      PLAN  1.  Urinary tract infection: Continue ceftriaxone follow-up urine culture presently negative.  2.  Metabolic encephalopathy: This appears to resolved at present continue management of underlying conditions  3.  ST depressions in chest pain: This is likely secondary to blood pressure drops and volume shifts presently resolved after medical therapy appreciate cardiology's assistance.  4.   Severe aortic stenosis: Medical management only per cardiology medications adjusted areas  5.  Anemia and thrombocytopenia: Continue to monitor likely secondary to underlying infection  6.  Viral upper respiratory infection: It sounds that she may have had a viral upper respiratory infection as well as could've precipitated some of the dehydration was present on admission.  We'll check a viral upper respiratory panel to evaluate for influenza but otherwise I would argue against treatment at the present time given the duration of symptoms.  We'll order Hycodan at night to help with cough    Disposition  Continue IV antibiotics through tomorrow probably home versus subacute rehabilitation Saturday      Rosalino Stover MD  [facility]  03/16/17  7:00 AM      EMR Dragon/Transcription disclaimer:     Much of this encounter note is an electronic transcription/translation of spoken language to printed text. The electronic translation of spoken language may permit erroneous, or at times, nonsensical words or phrases to be inadvertently transcribed; Although I have reviewed the note for such errors, some may still exist.

## 2017-03-16 NOTE — PROGRESS NOTES
Discharge Planning Assessment  [facility]     Patient Name: Isela Ewing  MRN: [id number]  Today's Date: 3/16/2017    Admit Date: 3/15/2017          Discharge Needs Assessment       03/16/17 1514    Living Environment    Lives With alone    Living Arrangements house    Quality Of Family Relationships supportive;helpful;involved    Discharge Needs Assessment    Concerns To Be Addressed basic needs concerns    Readmission Within The Last 30 Days no previous admission in last 30 days    Outpatient/Agency/Support Group Needs homecare agency (specify level of care)    Community Agency Name(S) [facility] recently for PT    Anticipated Changes Related to Illness inability to care for self    Equipment Currently Used at Home walker, rolling;other (see comments);none   stair lift    Discharge Facility/Level Of Care Needs nursing facility, skilled    Transportation Available family or friend will provide;car    Discharge Disposition still a patient            Discharge Plan       03/16/17 1516    Case Management/Social Work Plan    Plan SNF, referral to [facility]    Patient/Family In Agreement With Plan yes    Additional Comments Met with pt at bedside and she asked that I call and s/w her son Juan ([phone]) who is also her POA. Called and s/w Juan and he verified info on facesheet. Pt lives alone mala SS home with a basement and has a chair lift to get to the basement. Juan reports pt has remained relatively IADL's. Family provides all transportation. DME includes cane and walker. Pt was recently released from [facility] for PT. Son is interested in [facility] at [facility] upon discharge and would like referral made- [facility] left for Angelica to follow. Pt states he will provide transportation at discharge. CCP will continue to follow and assist as needed..............NICCI Ojeda, CCP        Discharge Placement     Facility/Agency Request Status Selected? Address Phone Number Fax Number    [facility] Pending - Request Sent      [address] [facility] [address] [phone]        Mervin Martin RN 3/16/2017 15:13    Vmm left for Angelica Martin RN                           Demographic Summary       03/16/17 1513    Referral Information    Admission Type inpatient    Arrived From still a patient    Reason For Consult discharge planning    Record Reviewed medical record    Contact Information    Permission Granted to Share Information With family/designee            Functional Status       03/16/17 1514    Functional Status Current    Ambulation 2-->assistive person    Transferring 2-->assistive person    Toileting 2-->assistive person    Bathing 2-->assistive person    Dressing 0-->independent    Eating 0-->independent    Functional Status Prior    Ambulation 1-->assistive equipment    Transferring 0-->independent    Toileting 0-->independent    Bathing 0-->independent    Dressing 0-->independent    Eating 0-->independent            Psychosocial     None            Abuse/Neglect     None            Legal       03/16/17 1515    Legal    Assistance with Managing/Advocating Healthcare Needs power of  for healthcare    Legal Comments son Juan Ewing- advised to provide a copy for records            Substance Abuse     None            Patient Forms     None          Mervin Martin RN

## 2017-03-17 ENCOUNTER — APPOINTMENT (OUTPATIENT)
Dept: GENERAL RADIOLOGY | Facility: HOSPITAL | Age: 82
End: 2017-03-17
Attending: HOSPITALIST

## 2017-03-17 PROBLEM — J96.01 ACUTE RESPIRATORY FAILURE WITH HYPOXIA (HCC): Status: ACTIVE | Noted: 2017-03-17

## 2017-03-17 LAB
ANION GAP SERPL CALCULATED.3IONS-SCNC: 9 MMOL/L
BUN BLD-MCNC: 12 MG/DL (ref 8–23)
BUN/CREAT SERPL: 12.6 (ref 7–25)
CALCIUM SPEC-SCNC: 9.4 MG/DL (ref 8.2–9.6)
CHLORIDE SERPL-SCNC: 102 MMOL/L (ref 98–107)
CO2 SERPL-SCNC: 30 MMOL/L (ref 22–29)
CREAT BLD-MCNC: 0.95 MG/DL (ref 0.57–1)
DEPRECATED RDW RBC AUTO: 53.5 FL (ref 37–54)
ERYTHROCYTE [DISTWIDTH] IN BLOOD BY AUTOMATED COUNT: 15.2 % (ref 11.7–13)
GFR SERPL CREATININE-BSD FRML MDRD: 55 ML/MIN/1.73
GLUCOSE BLD-MCNC: 128 MG/DL (ref 65–99)
HCT VFR BLD AUTO: 32.4 % (ref 35.6–45.5)
HGB BLD-MCNC: 10.6 G/DL (ref 11.9–15.5)
MCH RBC QN AUTO: 32.1 PG (ref 26.9–32)
MCHC RBC AUTO-ENTMCNC: 32.7 G/DL (ref 32.4–36.3)
MCV RBC AUTO: 98.2 FL (ref 80.5–98.2)
NT-PROBNP SERPL-MCNC: 711.4 PG/ML (ref 0–1800)
PLATELET # BLD AUTO: 154 10*3/MM3 (ref 140–500)
PMV BLD AUTO: 9.7 FL (ref 6–12)
POTASSIUM BLD-SCNC: 4 MMOL/L (ref 3.5–5.2)
PROCALCITONIN SERPL-MCNC: 0.09 NG/ML (ref 0.1–0.25)
RBC # BLD AUTO: 3.3 10*6/MM3 (ref 3.9–5.2)
SODIUM BLD-SCNC: 141 MMOL/L (ref 136–145)
WBC NRBC COR # BLD: 9.08 10*3/MM3 (ref 4.5–10.7)

## 2017-03-17 PROCEDURE — 94799 UNLISTED PULMONARY SVC/PX: CPT

## 2017-03-17 PROCEDURE — 97110 THERAPEUTIC EXERCISES: CPT

## 2017-03-17 PROCEDURE — 25010000003 CEFTRIAXONE PER 250 MG: Performed by: HOSPITALIST

## 2017-03-17 PROCEDURE — 84145 PROCALCITONIN (PCT): CPT | Performed by: HOSPITALIST

## 2017-03-17 PROCEDURE — 99232 SBSQ HOSP IP/OBS MODERATE 35: CPT | Performed by: INTERNAL MEDICINE

## 2017-03-17 PROCEDURE — 80048 BASIC METABOLIC PNL TOTAL CA: CPT | Performed by: HOSPITALIST

## 2017-03-17 PROCEDURE — 71010 HC CHEST PA OR AP: CPT

## 2017-03-17 PROCEDURE — 83880 ASSAY OF NATRIURETIC PEPTIDE: CPT | Performed by: HOSPITALIST

## 2017-03-17 PROCEDURE — 85027 COMPLETE CBC AUTOMATED: CPT | Performed by: HOSPITALIST

## 2017-03-17 RX ADMIN — ALBUTEROL SULFATE 2.5 MG: 2.5 SOLUTION RESPIRATORY (INHALATION) at 21:37

## 2017-03-17 RX ADMIN — ISOSORBIDE MONONITRATE 30 MG: 30 TABLET, EXTENDED RELEASE ORAL at 08:52

## 2017-03-17 RX ADMIN — ASPIRIN 81 MG: 81 TABLET ORAL at 08:52

## 2017-03-17 RX ADMIN — METOPROLOL TARTRATE 50 MG: 50 TABLET ORAL at 21:09

## 2017-03-17 RX ADMIN — HYDROCODONE BITARTRATE AND HOMATROPINE METHYLBROMIDE 5 ML: 5; 1.5 SOLUTION ORAL at 21:09

## 2017-03-17 RX ADMIN — LATANOPROST 1 DROP: 50 SOLUTION OPHTHALMIC at 22:28

## 2017-03-17 RX ADMIN — CEFTRIAXONE SODIUM 1 G: 1 INJECTION, SOLUTION INTRAVENOUS at 03:58

## 2017-03-17 RX ADMIN — AMLODIPINE BESYLATE 2.5 MG: 2.5 TABLET ORAL at 08:52

## 2017-03-17 RX ADMIN — ALBUTEROL SULFATE 2.5 MG: 2.5 SOLUTION RESPIRATORY (INHALATION) at 12:21

## 2017-03-17 RX ADMIN — METOPROLOL TARTRATE 50 MG: 50 TABLET ORAL at 08:52

## 2017-03-17 RX ADMIN — ALLOPURINOL 100 MG: 100 TABLET ORAL at 08:52

## 2017-03-17 RX ADMIN — Medication 10 ML: at 08:52

## 2017-03-17 NOTE — PLAN OF CARE
Problem: Fall Risk (Adult)  Goal: Identify Related Risk Factors and Signs and Symptoms  Outcome: Outcome(s) achieved Date Met:  03/17/17  Goal: Absence of Falls  Outcome: Ongoing (interventions implemented as appropriate)    Problem: Confusion, Acute (Adult)  Goal: Identify Related Risk Factors and Signs and Symptoms  Outcome: Outcome(s) achieved Date Met:  03/17/17  Goal: Cognitive/Functional Impairments Minimized  Outcome: Ongoing (interventions implemented as appropriate)  Goal: Safety  Outcome: Ongoing (interventions implemented as appropriate)    Problem: Infection, Risk/Actual (Adult)  Goal: Identify Related Risk Factors and Signs and Symptoms  Outcome: Outcome(s) achieved Date Met:  03/17/17  Goal: Infection Prevention/Resolution  Outcome: Ongoing (interventions implemented as appropriate)    Problem: Patient Care Overview (Adult)  Goal: Plan of Care Review  Outcome: Ongoing (interventions implemented as appropriate)    03/17/17 7095   Coping/Psychosocial Response Interventions   Plan Of Care Reviewed With patient   Patient Care Overview   Progress no change   Outcome Evaluation   Outcome Summary/Follow up Plan Should switch to PO antibiotics tomorrow, with possible discharge to [facility] per Dr. Stover. Cough continues. Breathing treatments and cough medication as needed. Will continue to monitor.        Goal: Adult Individualization and Mutuality  Outcome: Ongoing (interventions implemented as appropriate)  Goal: Discharge Needs Assessment  Outcome: Ongoing (interventions implemented as appropriate)

## 2017-03-17 NOTE — PROGRESS NOTES
Acute Care - Physical Therapy Treatment Note  [facility]     Patient Name: Isela Ewing  : 1924  MRN: [id number]  Today's Date: 3/17/2017  Onset of Illness/Injury or Date of Surgery Date: 03/15/17  Date of Referral to PT: 17       Admit Date: 3/15/2017    Visit Dx:    ICD-10-CM ICD-9-CM   1. Acute metabolic encephalopathy G93.41 348.31   2. Acute UTI N39.0 599.0   3. Hypokalemia E87.6 276.8   4. Difficulty walking R26.2 719.7     Patient Active Problem List   Diagnosis   • Acute colitis   • Acute metabolic encephalopathy   • UTI (urinary tract infection)   • Chest pain   • ST segment depression   • HTN (hypertension)   • HLD (hyperlipidemia)   • Chronic diastolic HF (heart failure)   • Hypokalemia   • Hypomagnesemia   • Acute respiratory failure with hypoxia               Adult Rehabilitation Note       17 1135          Rehab Assessment/Intervention    Discipline physical therapy assistant  -LB      Document Type therapy note (daily note)  -LB      Subjective Information agree to therapy  -LB      Patient Effort, Rehab Treatment good  -LB      Precautions/Limitations fall precautions  -LB      Recorded by [LB] Agnieszka Castellano PTA      Vital Signs    Pre SpO2 (%) 91  -LB      O2 Delivery Pre Treatment room air  -LB      Post SpO2 (%) 88  -LB      O2 Delivery Post Treatment room air  -LB      Recorded by [LB] Agnieszka Castellano PTA      Pain Assessment    Pain Assessment No/denies pain  -LB      Recorded by [LB] Agnieszka Castellano PTA      Cognitive Assessment/Intervention    Personal Safety Interventions gait belt;fall prevention program maintained;muscle strengthening facilitated;nonskid shoes/slippers when out of bed  -LB      Recorded by [LB] Agnieszka Castellano PTA      Bed Mobility, Assessment/Treatment    Bed Mobility, Assistive Device bed rails;head of bed elevated  -LB      Bed Mob, Supine to Sit, [place] supervision required  -LB      Bed Mob, Sit to Supine, [place]  supervision required  -LB      Recorded by [LB] Agnieszka Castellano PTA      Transfer Assessment/Treatment    Transfers, Sit-Stand [place] stand by assist  -LB      Transfers, Stand-Sit [place] stand by assist;verbal cues required  -LB      Transfers, Sit-Stand-Sit, Assist Device rolling walker  -LB      Recorded by [LB] Agnieszka Castellano PTA      Gait Assessment/Treatment    Gait, [place] Level contact guard assist;stand by assist  -LB      Gait, Assistive Device rolling walker  -LB      Gait, Distance (Feet) 175  -LB      Gait, Gait Deviations tony decreased;bilateral:;step length decreased;decreased heel strike  -LB      Recorded by [LB] Agnieszka Castellano PTA      Therapy Exercises    Bilateral Lower Extremities AROM:;10 reps;sitting  -LB      Recorded by [HELDER] Agnieszka Castellano PTA      Positioning and Restraints    Post Treatment Position bed  -LB      In Bed supine;call light within reach;encouraged to call for assist;exit alarm on  -LB      Recorded by [LB] Agnieszka Castellano PTA        User Key  (r) = Recorded By, (t) = Taken By, (c) = Cosigned By    Initials Name Effective Dates     Agnieszka Castellano PTA 02/18/16 -                 IP PT Goals       03/16/17 1448          Bed Mobility PT LTG    Bed Mobility PT LTG, Date Established 03/16/17  -EF      Bed Mobility PT LTG, Time to Achieve 1 wk  -EF      Bed Mobility PT LTG, Activity Type all bed mobility  -EF      Bed Mobility PT LTG, [place] Level conditional independence  -EF      Transfer Training PT LTG    Transfer Training PT LTG, Date Established 03/16/17  -EF      Transfer Training PT LTG, Time to Achieve 1 wk  -EF      Transfer Training PT LTG, Activity Type all transfers  -EF      Transfer Training PT LTG, [place] Level conditional independence  -EF      Gait Training PT LTG    Gait Training Goal PT LTG, Date Established 03/16/17  -EF      Gait Training Goal PT LTG, Time to Achieve 1 wk  -EF      Gait Training Goal PT  LTG, [place] Level supervision required;conditional independence  -EF      Gait Training Goal PT LTG, Assist Device --   with or without device  -EF      Gait Training Goal PT LTG, Distance to Achieve 160  -EF        User Key  (r) = Recorded By, (t) = Taken By, (c) = Cosigned By    Initials Name Provider Type    EF Isela Romero, PT Physical Therapist          Physical Therapy Education     Title: PT OT SLP Therapies (Done)     Topic: Physical Therapy (Done)     Point: Mobility training (Done)    Learning Progress Summary    Learner Readiness Method Response Comment Documented by Status   Patient Acceptance E VU,NR  LB 03/17/17 1358 Done    Acceptance E VU,NR  EF 03/16/17 1448 Done               Point: Home exercise program (Done)    Learning Progress Summary    Learner Readiness Method Response Comment Documented by Status   Patient Acceptance E VU,NR  EF 03/16/17 1448 Done               Point: Body mechanics (Done)    Learning Progress Summary    Learner Readiness Method Response Comment Documented by Status   Patient Acceptance E VU,NR  EF 03/16/17 1448 Done               Point: Precautions (Done)    Learning Progress Summary    Learner Readiness Method Response Comment Documented by Status   Patient Acceptance E VU,NR  EF 03/16/17 1448 Done                      User Key     Initials Effective Dates Name Provider Type Discipline    EF 04/24/15 -  Isela Romero, PT Physical Therapist PT    LB 02/18/16 -  Agnieszka Castellano PTA Physical Therapy Assistant PT                    PT Recommendation and Plan  Anticipated Discharge Disposition: home, skilled nursing facility (depending on progress)  Planned Therapy Interventions: bed mobility training, gait training, home exercise program, patient/family education, transfer training  Plan of Care Review  Plan Of Care Reviewed With: patient  Progress: improving  Outcome Summary/Follow up Plan: Pnt ambulated much farther today than yesterday. Amb on room air.  O2 sats 88% after amb 175 ft.          Outcome Measures       03/17/17 1400 03/16/17 1451       How much help from another person do you currently need...    Turning from your back to your side while in flat bed without using bedrails? 4  -LB 3  -EF     Moving from lying on back to sitting on the side of a flat bed without bedrails? 3  -LB 3  -EF     Moving to and from a bed to a chair (including a wheelchair)? 3  -LB 3  -EF     Standing up from a chair using your arms (e.g., wheelchair, bedside chair)? 3  -LB 3  -EF     Climbing 3-5 steps with a railing? 3  -LB 2  -EF     To walk in hospital room? 3  -LB 3  -EF     AM-PAC 6 Clicks Score 19  -LB 17  -EF     Functional Assessment    Outcome Measure Options AM-PAC 6 Clicks Basic Mobility (PT)  -LB AM-PAC 6 Clicks Basic Mobility (PT)  -EF       User Key  (r) = Recorded By, (t) = Taken By, (c) = Cosigned By    Initials Name Provider Type     Isela Romero, PT Physical Therapist     Agnieszka Castellano PTA Physical Therapy Assistant           Time Calculation:         PT Charges       03/17/17 1352          Time Calculation    Start Time 1335  -LB      Stop Time 1345  -LB      Time Calculation (min) 10 min  -LB        User Key  (r) = Recorded By, (t) = Taken By, (c) = Cosigned By    Initials Name Provider Type     Agnieszka Castellano PTA Physical Therapy Assistant          Therapy Charges for Today     Code Description Service Date Service Provider Modifiers Qty    27582794255 HC PT THER PROC EA 15 MIN 3/17/2017 Agnieszka Castellano PTA GP 1          PT G-Codes  Outcome Measure Options: AM-PAC 6 Clicks Basic Mobility (PT)    Agnieszka Castellano PTA  3/17/2017

## 2017-03-17 NOTE — PROGRESS NOTES
LOS: 2 days     Name: Isela Ewing  Age/Sex: 89 y.o. female  :  1924        PCP: Rebecca Brower MD    Subjective   Did not sleep well coughing more today.  No new complaints no chest pain  General: No Fever or Chills, Cardiac: No Chest Pain or Palpitations, Resp: No Cough or SOA, GI: No Nausea, Vomiting, or Diarrhea and Other: No bleeding      allopurinol 100 mg Oral Daily   amLODIPine 2.5 mg Oral Daily   aspirin 81 mg Oral Daily   ceftriaxone 1 g Intravenous Q24H   isosorbide mononitrate 30 mg Oral Q24H   latanoprost 1 drop Both Eyes Nightly   metoprolol tartrate 50 mg Oral Q12H   potassium chloride 40 mEq Oral Once   tiotropium 1 capsule Inhalation Daily - RT          Objective   Vital Signs  Temp:  [97.4 °F (36.3 °C)-97.8 °F (36.6 °C)] 97.8 °F (36.6 °C)  Heart Rate:  [58-72] 72  Resp:  [16-18] 16  BP: (117-135)/(51-59) 118/51  Body mass index is 22.38 kg/(m^2).    Intake/Output Summary (Last 24 hours) at 17 0754  Last data filed at 17 1527   Gross per 24 hour   Intake    360 ml   Output    100 ml   Net    260 ml       General:  Resting comfortably no active distress  Eyes:  PERRL; no conj pallor; EOMI; no scleral icterus  ENT:  oral mucosa moist; pharynx w/o exudate; ext inspect WNL  Neck: nl movement; no JVD; no adenopathy; thyroid WNL  Lungs:  CTA; good air entry; Non labored; No wheeze, No Crackle, No Rhonchi  Cardiac:  RRR; faint murmurs;  nl S1/S2  Abd:  soft; non tender; non distended; no HSM; no masses; BS +  : deferred  Musc/Skel: no arthropathy, or deformity   Skin:  warm and perfused; no apparent rash on examined areas  Neuro: CN grossly intact; no focal deficit of strength or sensory   Ext/P Vasc:  peripheral pulses 2+; no clubbing ; no cyanosis; no edema  MS & Psychiatric: A&O x  3; memory intact; affect/mood appropriate    Results Review:       I reviewed the patient's new clinical results.    Results from last 7 days  Lab Units 17  0354 03/15/17  075  03/14/17  2140   WBC 10*3/mm3 8.31 10.14 9.68   HEMOGLOBIN g/dL 10.6* 12.2 12.2   PLATELETS 10*3/mm3 132* 136* 163       Results from last 7 days  Lab Units 03/16/17  0354 03/15/17  0757 03/14/17  2140   SODIUM mmol/L 141 141 140   POTASSIUM mmol/L 4.2 3.3*  3.3* 3.1*   CHLORIDE mmol/L 102 100 97*   TOTAL CO2 mmol/L 26.4 26.1 27.9   BUN mg/dL 11 11 13   CREATININE mg/dL 0.96 0.80 0.90   CALCIUM mg/dL 9.2 9.5 9.7*   MAGNESIUM mg/dL 2.1 1.6* 1.8   PHOSPHORUS mg/dL 3.6  --   --    Estimated Creatinine Clearance: 26.3 mL/min (by C-G formula based on Cr of 0.96).    Results from last 7 days  Lab Units 03/16/17  0354 03/15/17  1523 03/15/17  0758 03/15/17  0757   CK TOTAL U/L  --   --  55  --    TROPONIN T ng/mL <0.010 <0.010  --  <0.010   PROBNP pg/mL 1485.0  --   --  574.0       Results from last 7 days  Lab Units 03/14/17  2211   NITRITE UA  Negative   WBC UA /HPF 31-50*   BACTERIA UA /HPF None Seen   SQUAM EPITHEL UA /HPF 0-2   URINECX  No growth       Assessment/Plan   Principal Problem:    Acute metabolic encephalopathy  Active Problems:    UTI (urinary tract infection)    Chest pain    ST segment depression    HTN (hypertension)    HLD (hyperlipidemia)    Chronic diastolic HF (heart failure)    Hypokalemia    Hypomagnesemia      PLAN  1.  Urinary tract infection: Continue ceftriaxone follow-up urine culture presently negative.  2.  Metabolic encephalopathy: This appears to resolved at present continue management of underlying conditions  3.  ST depressions in chest pain: This is likely secondary to blood pressure drops and volume shifts presently resolved after medical therapy appreciate cardiology's assistance.  4.  Severe aortic stenosis: Medical management only per cardiology medications adjusted areas  5.  Anemia and thrombocytopenia: Continue to monitor likely secondary to underlying infection  6.  Viral upper respiratory infection: It sounds that she may have had a viral upper respiratory infection as well  as could've precipitated some of the dehydration was present on admission.  We'll check a viral upper respiratory panel to evaluate for influenza but otherwise I would argue against treatment at the present time given the duration of symptoms.  We'll order Hycodan at night to help with cough, breathing treatments encouraged today  7. Acute hypoxic resp failure: resolved    Disposition  Continue IV antibiotics through tomorrow probably home versus subacute rehabilitation Saturday      Rosalino Stover MD  [facility]  03/17/17  7:54 AM      EMR Dragon/Transcription disclaimer:     Much of this encounter note is an electronic transcription/translation of spoken language to printed text. The electronic translation of spoken language may permit erroneous, or at times, nonsensical words or phrases to be inadvertently transcribed; Although I have reviewed the note for such errors, some may still exist.

## 2017-03-17 NOTE — PLAN OF CARE
Problem: Patient Care Overview (Adult)  Goal: Plan of Care Review  Outcome: Ongoing (interventions implemented as appropriate)    03/17/17 0526   Coping/Psychosocial Response Interventions   Plan Of Care Reviewed With patient   Patient Care Overview   Progress improving   Outcome Evaluation   Outcome Summary/Follow up Plan Pt receiving IV antibiotics. Cough appears to be improving. VSS.       Goal: Adult Individualization and Mutuality  Outcome: Ongoing (interventions implemented as appropriate)  Goal: Discharge Needs Assessment  Outcome: Ongoing (interventions implemented as appropriate)

## 2017-03-17 NOTE — PLAN OF CARE
Problem: Patient Care Overview (Adult)  Goal: Plan of Care Review  Outcome: Ongoing (interventions implemented as appropriate)    03/17/17 6239   Coping/Psychosocial Response Interventions   Plan Of Care Reviewed With patient   Patient Care Overview   Progress improving   Outcome Evaluation   Outcome Summary/Follow up Plan Pnt ambulated much farther today than yesterday. Amb on room air. O2 sats 88% after amb 175 ft.

## 2017-03-17 NOTE — PROGRESS NOTES
Continued Stay Note  [facility]     Patient Name: Isela Ewing  MRN: [id number]  Today's Date: 3/17/2017    Admit Date: 3/15/2017          Discharge Plan       03/17/17 1416    Case Management/Social Work Plan    Plan SNF- bed available at [facility] on Saturday    Additional Comments Per [facility] a room is available for patient  on Saturday at [facility]. Family intersted in the forum as a second choice. Funmi asked to follow and states she will advise if facility can accept.....  SONJA Fu              Discharge Codes     None            SONJA Fu

## 2017-03-17 NOTE — PROGRESS NOTES
"Estefani Ewing  1/14/1924 89 y.o.  [id number]      Patient Care Team:  Rebecca Brower MD as PCP - General (Internal Medicine)    CC: Chest pain    Interval History: She is doing better today no further angina      Objective   Vital Signs  Temp:  [97.5 °F (36.4 °C)-97.8 °F (36.6 °C)] 97.8 °F (36.6 °C)  Heart Rate:  [58-72] 67  Resp:  [16-18] 18  BP: (117-145)/(51-62) 130/59    Intake/Output Summary (Last 24 hours) at 03/17/17 0911  Last data filed at 03/17/17 0908   Gross per 24 hour   Intake    600 ml   Output    100 ml   Net    500 ml     Flowsheet Rows         First Filed Value    Admission Height  60\" (152.4 cm) Documented at 03/14/2017 2028    Admission Weight  119 lb (54 kg) Documented at 03/14/2017 2028          Physical Exam:   General Appearance:    Alert,oriented, in no acute distress   Lungs:     Clear to auscultation,BS are equal    Heart:    Normal S1 and S2, RRR with iii/vi dane murmur, gallop or rub   HEENT:    Sclera are clear, no JVD or adenopathy   Abdomen:     Normal bowel sounds, soft non-tender, non-distended, no HSM   Extremities:   Moves all extremities well, no edema, no cyanosis, no             Redness, no rash     Medication Review:        allopurinol 100 mg Oral Daily   amLODIPine 2.5 mg Oral Daily   aspirin 81 mg Oral Daily   ceftriaxone 1 g Intravenous Q24H   isosorbide mononitrate 30 mg Oral Q24H   latanoprost 1 drop Both Eyes Nightly   metoprolol tartrate 50 mg Oral Q12H   potassium chloride 40 mEq Oral Once   tiotropium 1 capsule Inhalation Daily - RT            I reviewed the patient's new clinical results.  I personally viewed and interpreted the patient's EKG/Telemetry data    Assessment/Plan  Active Hospital Problems (** Indicates Principal Problem)    Diagnosis Date Noted   • **Acute metabolic encephalopathy [G93.41] 03/15/2017   • UTI (urinary tract infection) [N39.0] 03/15/2017   • Chest pain [R07.9] 03/15/2017   • ST segment depression [R94.31] 03/15/2017   • HTN " (hypertension) [I10] 03/15/2017   • HLD (hyperlipidemia) [E78.5] 03/15/2017   • Chronic diastolic HF (heart failure) [I50.32] 03/15/2017   • Hypokalemia [E87.6] 03/15/2017   • Hypomagnesemia [E83.42] 03/15/2017      Resolved Hospital Problems    Diagnosis Date Noted Date Resolved   No resolved problems to display.       She's doing better today her EKG is back at baseline she's not having angina blood pressures a little bit improved dental work on treatment of that switch her medicines around within a be conservative she's all a candidate for medical therapy she has moderate to severe aortic stenosis normal LV function    Her BP is better.  Will continue with conservative therapy and we will see as needed.    Tim Poe MD  03/17/17  9:11 AM

## 2017-03-18 VITALS
OXYGEN SATURATION: 96 % | DIASTOLIC BLOOD PRESSURE: 49 MMHG | RESPIRATION RATE: 18 BRPM | TEMPERATURE: 97.7 F | SYSTOLIC BLOOD PRESSURE: 105 MMHG | WEIGHT: 114.6 LBS | HEIGHT: 60 IN | HEART RATE: 61 BPM | BODY MASS INDEX: 22.5 KG/M2

## 2017-03-18 LAB
BASOPHILS # BLD AUTO: 0.02 10*3/MM3 (ref 0–0.2)
BASOPHILS NFR BLD AUTO: 0.3 % (ref 0–1.5)
DEPRECATED RDW RBC AUTO: 55 FL (ref 37–54)
EOSINOPHIL # BLD AUTO: 0.19 10*3/MM3 (ref 0–0.7)
EOSINOPHIL NFR BLD AUTO: 2.6 % (ref 0.3–6.2)
ERYTHROCYTE [DISTWIDTH] IN BLOOD BY AUTOMATED COUNT: 15 % (ref 11.7–13)
HCT VFR BLD AUTO: 30.3 % (ref 35.6–45.5)
HGB BLD-MCNC: 9.8 G/DL (ref 11.9–15.5)
IMM GRANULOCYTES # BLD: 0 10*3/MM3 (ref 0–0.03)
IMM GRANULOCYTES NFR BLD: 0 % (ref 0–0.5)
LYMPHOCYTES # BLD AUTO: 1.91 10*3/MM3 (ref 0.9–4.8)
LYMPHOCYTES NFR BLD AUTO: 26.1 % (ref 19.6–45.3)
MCH RBC QN AUTO: 32.6 PG (ref 26.9–32)
MCHC RBC AUTO-ENTMCNC: 32.3 G/DL (ref 32.4–36.3)
MCV RBC AUTO: 100.7 FL (ref 80.5–98.2)
MONOCYTES # BLD AUTO: 0.67 10*3/MM3 (ref 0.2–1.2)
MONOCYTES NFR BLD AUTO: 9.1 % (ref 5–12)
NEUTROPHILS # BLD AUTO: 4.54 10*3/MM3 (ref 1.9–8.1)
NEUTROPHILS NFR BLD AUTO: 61.9 % (ref 42.7–76)
PLATELET # BLD AUTO: 115 10*3/MM3 (ref 140–500)
PMV BLD AUTO: 9.7 FL (ref 6–12)
RBC # BLD AUTO: 3.01 10*6/MM3 (ref 3.9–5.2)
WBC NRBC COR # BLD: 7.33 10*3/MM3 (ref 4.5–10.7)

## 2017-03-18 PROCEDURE — 85025 COMPLETE CBC W/AUTO DIFF WBC: CPT | Performed by: HOSPITALIST

## 2017-03-18 PROCEDURE — 94640 AIRWAY INHALATION TREATMENT: CPT

## 2017-03-18 PROCEDURE — 25010000003 CEFTRIAXONE PER 250 MG: Performed by: HOSPITALIST

## 2017-03-18 PROCEDURE — 25010000002 FUROSEMIDE PER 20 MG: Performed by: HOSPITALIST

## 2017-03-18 PROCEDURE — 94799 UNLISTED PULMONARY SVC/PX: CPT

## 2017-03-18 RX ORDER — CIPROFLOXACIN 250 MG/1
250 TABLET, FILM COATED ORAL 2 TIMES DAILY
Qty: 6 TABLET | Refills: 0
Start: 2017-03-18 | End: 2017-03-21

## 2017-03-18 RX ORDER — ALBUTEROL SULFATE 2.5 MG/3ML
2.5 SOLUTION RESPIRATORY (INHALATION) EVERY 6 HOURS PRN
Refills: 12
Start: 2017-03-18

## 2017-03-18 RX ORDER — AMLODIPINE BESYLATE 2.5 MG/1
2.5 TABLET ORAL DAILY
Start: 2017-03-18 | End: 2018-04-16

## 2017-03-18 RX ORDER — ISOSORBIDE MONONITRATE 30 MG/1
30 TABLET, EXTENDED RELEASE ORAL
Start: 2017-03-18 | End: 2018-04-16

## 2017-03-18 RX ORDER — FUROSEMIDE 10 MG/ML
40 INJECTION INTRAMUSCULAR; INTRAVENOUS ONCE
Status: COMPLETED | OUTPATIENT
Start: 2017-03-18 | End: 2017-03-18

## 2017-03-18 RX ADMIN — ISOSORBIDE MONONITRATE 30 MG: 30 TABLET, EXTENDED RELEASE ORAL at 09:40

## 2017-03-18 RX ADMIN — ALLOPURINOL 100 MG: 100 TABLET ORAL at 09:40

## 2017-03-18 RX ADMIN — CEFTRIAXONE SODIUM 1 G: 1 INJECTION, SOLUTION INTRAVENOUS at 04:50

## 2017-03-18 RX ADMIN — ASPIRIN 81 MG: 81 TABLET ORAL at 09:40

## 2017-03-18 RX ADMIN — AMLODIPINE BESYLATE 2.5 MG: 2.5 TABLET ORAL at 09:40

## 2017-03-18 RX ADMIN — TIOTROPIUM BROMIDE 1 CAPSULE: 18 CAPSULE ORAL; RESPIRATORY (INHALATION) at 10:31

## 2017-03-18 RX ADMIN — ALBUTEROL SULFATE 2.5 MG: 2.5 SOLUTION RESPIRATORY (INHALATION) at 10:32

## 2017-03-18 RX ADMIN — METOPROLOL TARTRATE 50 MG: 50 TABLET ORAL at 09:40

## 2017-03-18 RX ADMIN — FUROSEMIDE 40 MG: 10 INJECTION, SOLUTION INTRAMUSCULAR; INTRAVENOUS at 09:46

## 2017-03-18 NOTE — DISCHARGE SUMMARY
Date of Admission: 3/15/2017  Date of Discharge:  3/18/2017    PCP: Rebecca Brower MD      DISCHARGE DIAGNOSIS  Principal Problem:    Acute metabolic encephalopathy  Active Problems:    UTI (urinary tract infection)    Chest pain    ST segment depression    HTN (hypertension)    HLD (hyperlipidemia)    Chronic diastolic HF (heart failure)    Hypokalemia    Hypomagnesemia    Acute respiratory failure with hypoxia      SECONDARY DIAGNOSES  Past Medical History   Diagnosis Date   • Aortic stenosis    • Arthritis    • Diverticulitis    • Glaucoma      macular degeneration   • Hyperlipidemia    • Hypertension    • Macular degeneration        CONSULTS   Consults     Date and Time Order Name Status Description    3/15/2017 0745 Inpatient Consult to Cardiology Completed     3/15/2017 0253 [facility] (on-call MD unless specified) Completed           PROCEDURES PERFORMED    Results from last 7 days  Lab Units 03/14/17  2211   NITRITE UA  Negative   WBC UA /HPF 31-50*   BACTERIA UA /HPF None Seen   SQUAM EPITHEL UA /HPF 0-2   URINECX  No growth     Chest x-ray:  3/17/17:Heart size is within normal limits. There is mild to moderate  interstitial prominence of the lungs which appears to have progressed  slightly since the 03/15/17 study and there may be some minimal  interstitial edema superimposed on some interstitial fibrosis. No focal  infiltrates are seen. No pneumothorax is seen.      HOSPITAL COURSE  Patient is a 89 y.o. female presented to [facility] complaining of confusion and weaknesss.  Please see the admitting history and physical for further details.  She was admitted with a urinary tract infection and metabolic encephalopathy.  She is hypertensive on arrival to the unit and was given 20 mg of IV hydralazine.  20 minutes after this dose she developed significant substernal chest pain radiating to the left shoulder that was rated a 10 out of 10.  She was evaluated quickly at the bedside and was  found to be hypoxic and blood pressure had significantly dropped.  She was given a dose of nitroglycerin and aspirin and a dose of the beta blockade.  Her chest pain slowly dissipated.  Initial EKG did show some ST depression and that relieved with medical therapy.  At the time of discharge her EKG is reverted to normal.  After cardiac stabilization and evaluation by cardiologist no further workup was indicated just medical management.  Enzymes remained stable without elevation.  After evaluation and admission from the emergency room encephalopathy and nearly resolved.  She was found to be dry hydrated and responded well to IV fluids.  She developed a slight bit of pulmonary edema secondary to diastolic heart failure and will be given 1 dose of IV diuretics prior to discharge and then can resume oral diuretics at that time.  As far as her urinary tract infection is concerned urine culture was negative for this hospital stay but she has had Escherichia coli in the past.  She'll be treated as a culture-negative UTI (probably secondary to incomplete outpatient treatment) and she was treated with 4 days of IV Rocephin and will complete a seven-day course with 3 days of ciprofloxacin at discharge.  She also had some wheezing and shortness of breath with hypoxia after this event she does not carry a diagnosis of COPD but does take medications consistent with that.  I would recommend continuing the albuterol breathing treatments when necessary as needed for shortness of breath.  She has periodic hypoxia would continue 2 L nasal cannula at night and continue to titrate at the nursing home.  Otherwise on the day of discharge she's feeling much better still with a mild productive cough likely from a viral upper respiratory infection prior to admission.  He's been working with physical therapy and improving daily.      CONDITION ON DISCHARGE  Stable.      VITAL SIGNS  Visit Vitals   • /67 (BP Location: Right arm, Patient  "Position: Lying)   • Pulse 71   • Temp 97.6 °F (36.4 °C) (Oral)   • Resp 16   • Ht 60\" (152.4 cm)   • Wt 114 lb 9.6 oz (52 kg)   • SpO2 91%   • BMI 22.38 kg/m2     Objective:  General Appearance:  Comfortable.    Vital signs: (most recent): Blood pressure 148/67, pulse 71, temperature 97.6 °F (36.4 °C), temperature source Oral, resp. rate 16, height 60\" (152.4 cm), weight 114 lb 9.6 oz (52 kg), SpO2 91 %, not currently breastfeeding.  Vital signs are normal.    Output: Producing urine and producing stool.    HEENT: Normal HEENT exam.    Lungs:  Normal respiratory rate and normal effort.  Breath sounds clear to auscultation.  There are rales.  No wheezes.  (Faint crackles at the bases bilaterally)  Heart: Normal rate.  Regular rhythm.  S1 normal.    Abdomen: Abdomen is soft.  Bowel sounds are normal.   There is no abdominal tenderness.     Extremities: Normal range of motion.    Pulses: Distal pulses are intact.    Neurological: Patient is alert and oriented to person, place and time.    Pupils:  Pupils are equal, round, and reactive to light.    Skin:  Warm and dry.          DISCHARGE DISPOSITION   Skilled Nursing Facility (DC - External)      DISCHARGE MEDICATIONS   Isela Ewing   Home Medication Instructions CHACHO:[id number]    Printed on:03/18/17 1033   Medication Information                      acetaminophen (TYLENOL) 325 MG tablet  Take 2 tablets by mouth every 4 (four) hours as needed for mild pain (1-3).             albuterol (PROVENTIL) (2.5 MG/3ML) 0.083% nebulizer solution  Take 2.5 mg by nebulization Every 6 (Six) Hours As Needed for Shortness of Air.             allopurinol (ZYLOPRIM) 100 MG tablet  Take 100 mg by mouth daily.             amLODIPine (NORVASC) 2.5 MG tablet  Take 1 tablet by mouth Daily.             aspirin 81 MG EC tablet  Take 81 mg by mouth daily.             ciprofloxacin (CIPRO) 250 MG tablet  Take 1 tablet by mouth 2 (Two) Times a Day for 3 days.             furosemide " (LASIX) 40 MG tablet  TAKE 1 TABLET EVERY DAY             HYDROcodone-homatropine (HYCODAN) 5-1.5 MG/5ML syrup  Take 5 mL by mouth At Night As Needed for Cough for up to 8 days.             isosorbide mononitrate (IMDUR) 30 MG 24 hr tablet  Take 1 tablet by mouth Daily.             latanoprost (XALATAN) 0.005 % ophthalmic solution  Administer 1 drop to both eyes Every Night.             metoprolol tartrate (LOPRESSOR) 25 MG tablet  TAKE 1 TABLET TWICE DAILY             raloxifene (EVISTA) 60 MG tablet  Take 60 mg by mouth daily.             tiotropium (SPIRIVA) 18 MCG per inhalation capsule  Place 1 capsule into inhaler and inhale 1 (one) time daily.               Diet Instructions     Diet: Regular; Thin Liquids, No Restrictions       Discharge Diet:  Regular   Fluid Consistency:  Thin Liquids, No Restrictions                Activity Instructions     Activity as Tolerated                 No future appointments.  Additional Instructions for the Follow-ups that You Need to Schedule     Additional Discharge Follow-Up (Specify Provider)    As directed    To:  Follow up with cardiology   Follow Up:  1 Month             Follow-up Information     Follow up with [facility] .    Specialty:  Skilled Nursing Facility    Contact information:    [address]   [facility] [place] [address] [phone]        Follow up with Rebecca Brower MD .    Specialty:  Internal Medicine    Contact information:    [address] [id number] [phone]            TEST  RESULTS PENDING AT DISCHARGE         Rosalino Stover MD  [facility]  03/18/17  10:33 AM      Time: greater than 30 minutes.      Dragon disclaimer:  Much of this encounter note is an electronic transcription/translation of spoken language to printed text. The electronic translation of spoken language may permit erroneous, or at times, nonsensical words or phrases to be inadvertently transcribed;  Although I have reviewed the note for such errors, some may still exist.

## 2017-03-18 NOTE — PLAN OF CARE
Problem: Inpatient Physical Therapy  Goal: Transfer Training Goal 1 LTG- PT  Outcome: Unable to achieve outcome(s) by discharge Date Met:  03/18/17 03/18/17 1246   Transfer Training PT LTG   Transfer Training PT LTG, Activity Type all transfers   Transfer Training PT LTG, Date Goal Reviewed (stand by assistance)   Transfer Training PT LTG, Outcome goal not met   Transfer Training PT LTG, Reason Goal Not Met discharged from facility

## 2017-03-18 NOTE — PLAN OF CARE
Problem: Patient Care Overview (Adult)  Goal: Plan of Care Review  Outcome: Ongoing (interventions implemented as appropriate)    03/18/17 0252   Coping/Psychosocial Response Interventions   Plan Of Care Reviewed With patient   Patient Care Overview   Progress progress toward functional goals as expected   Outcome Evaluation   Outcome Summary/Follow up Plan Prn cough med given, no c/o pain, no c/o SOA, prn breathing treatment X1, IV Rocephin as ordered, safety maintained

## 2017-03-18 NOTE — PLAN OF CARE
Problem: Inpatient Physical Therapy  Goal: Bed Mobility Goal LTG- PT  Outcome: Unable to achieve outcome(s) by discharge Date Met:  03/18/17 03/18/17 1245   Bed Mobility PT LTG   Bed Mobility PT LTG, Activity Type all bed mobility   Bed Mobility PT LTG, [place] Level supervision required   Bed Mobility PT LTG, Outcome goal not met   Bed Mobility PT LTG, Reason Goal Not Met discharged from facility

## 2017-03-18 NOTE — PLAN OF CARE
Problem: Fall Risk (Adult)  Goal: Absence of Falls  Outcome: Outcome(s) achieved Date Met:  03/18/17 03/18/17 1356   Fall Risk (Adult)   Absence of Falls achieves outcome         Problem: Confusion, Acute (Adult)  Goal: Cognitive/Functional Impairments Minimized  Outcome: Outcome(s) achieved Date Met:  03/18/17  Goal: Safety  Outcome: Outcome(s) achieved Date Met:  03/18/17    Problem: Infection, Risk/Actual (Adult)  Goal: Infection Prevention/Resolution  Outcome: Outcome(s) achieved Date Met:  03/18/17    Problem: Patient Care Overview (Adult)  Goal: Plan of Care Review  Outcome: Ongoing (interventions implemented as appropriate)  Goal: Adult Individualization and Mutuality  Outcome: Unable to achieve outcome(s) by discharge Date Met:  03/18/17  Goal: Discharge Needs Assessment  Outcome: Outcome(s) achieved Date Met:  03/18/17

## 2017-03-18 NOTE — PLAN OF CARE
Problem: Inpatient Physical Therapy  Goal: Gait Training Goal LTG- PT  Outcome: Unable to achieve outcome(s) by discharge Date Met:  03/18/17 03/18/17 1247   Gait Training PT LTG   Gait Training Goal PT LTG, [place] Level contact guard assist   Gait Training Goal PT LTG, Assist Device walker, rolling   Gait Training Goal PT LTG, Distance to Achieve 175   Gait Training Goal PT LTG, Outcome goal partially met   Gait Training Goal PT LTG, Reason Goal Not Met discharged from facility

## 2017-03-18 NOTE — THERAPY DISCHARGE NOTE
Acute Care - Physical Therapy Discharge Summary  [facility]       Patient Name: Isela Ewing  : 1924  MRN: [id number]    Today's Date: 3/18/2017  Onset of Illness/Injury or Date of Surgery Date: 03/15/17    Date of Referral to PT: 17         Admit Date: 3/15/2017      PT Recommendation and Plan    Visit Dx:    ICD-10-CM ICD-9-CM   1. Acute metabolic encephalopathy G93.41 348.31   2. Acute UTI N39.0 599.0   3. Hypokalemia E87.6 276.8   4. Difficulty walking R26.2 719.7             Outcome Measures       17 1400 17 1451       How much help from another person do you currently need...    Turning from your back to your side while in flat bed without using bedrails? 4  -LB 3  -EF     Moving from lying on back to sitting on the side of a flat bed without bedrails? 3  -LB 3  -EF     Moving to and from a bed to a chair (including a wheelchair)? 3  -LB 3  -EF     Standing up from a chair using your arms (e.g., wheelchair, bedside chair)? 3  -LB 3  -EF     Climbing 3-5 steps with a railing? 3  -LB 2  -EF     To walk in hospital room? 3  -LB 3  -EF     AM-PAC 6 Clicks Score 19  -LB 17  -EF     Functional Assessment    Outcome Measure Options AM-PAC 6 Clicks Basic Mobility (PT)  -LB AM-PAC 6 Clicks Basic Mobility (PT)  -EF       User Key  (r) = Recorded By, (t) = Taken By, (c) = Cosigned By    Initials Name Provider Type    EF Isela Romero, PT Physical Therapist    LB Agnieszka Castellano, PTA Physical Therapy Assistant                      IP PT Goals       17 1247 17 1246 17 1245    Bed Mobility PT LTG    Bed Mobility PT LTG, Activity Type   all bed mobility  -MM    Bed Mobility PT LTG, [place] Level   supervision required  -MM    Bed Mobility PT LTG, Outcome   goal not met  -MM    Bed Mobility PT LTG, Reason Goal Not Met   discharged from facility  -MM    Transfer Training PT LTG    Transfer Training PT LTG, Activity Type  all transfers  -MM     Transfer Training PT   LTG, Date Goal Reviewed  --   stand by assistance  -MM     Transfer Training PT LTG, Outcome  goal not met  -MM     Transfer Training PT LTG, Reason Goal Not Met  discharged from facility  -MM     Gait Training PT LTG    Gait Training Goal PT LTG, [place] Level contact guard assist  -MM      Gait Training Goal PT LTG, Assist Device walker, rolling  -MM      Gait Training Goal PT LTG, Distance to Achieve 175  -MM      Gait Training Goal PT LTG, Outcome goal partially met  -MM      Gait Training Goal PT LTG, Reason Goal Not Met discharged from facility  -MM        03/16/17 1448          Bed Mobility PT LTG    Bed Mobility PT LTG, Date Established 03/16/17  -EF      Bed Mobility PT LTG, Time to Achieve 1 wk  -EF      Bed Mobility PT LTG, Activity Type all bed mobility  -EF      Bed Mobility PT LTG, [place] Level conditional independence  -EF      Transfer Training PT LTG    Transfer Training PT LTG, Date Established 03/16/17  -EF      Transfer Training PT LTG, Time to Achieve 1 wk  -EF      Transfer Training PT LTG, Activity Type all transfers  -EF      Transfer Training PT LTG, [place] Level conditional independence  -EF      Gait Training PT LTG    Gait Training Goal PT LTG, Date Established 03/16/17  -EF      Gait Training Goal PT LTG, Time to Achieve 1 wk  -EF      Gait Training Goal PT LTG, [place] Level supervision required;conditional independence  -EF      Gait Training Goal PT LTG, Assist Device --   with or without device  -EF      Gait Training Goal PT LTG, Distance to Achieve 160  -EF        User Key  (r) = Recorded By, (t) = Taken By, (c) = Cosigned By    Initials Name Provider Type    CHRISTO Romero, PT Physical Therapist    MM Kaylee Bolton, PTA Physical Therapy Assistant              PT Discharge Summary  Anticipated Discharge Disposition: skilled nursing facility  Reason for Discharge: Discharge from facility  Outcomes Achieved: Unable to make functional progress toward  goals at this time  Discharge Destination:       Kaylee Bolton, PTA   3/18/2017

## 2017-08-28 ENCOUNTER — TELEPHONE (OUTPATIENT)
Dept: CARDIOLOGY | Facility: CLINIC | Age: 82
End: 2017-08-28

## 2017-08-28 NOTE — TELEPHONE ENCOUNTER
Heidi called to request 20 tablets of Metoprolol be sent to [facility] while awaiting mail order prescription. Medication called into [facility]. Thanks, Tawana

## 2017-09-11 RX ORDER — FUROSEMIDE 40 MG/1
TABLET ORAL
Qty: 90 TABLET | Refills: 1 | Status: SHIPPED | OUTPATIENT
Start: 2017-09-11 | End: 2018-01-29 | Stop reason: SDUPTHER

## 2018-01-01 ENCOUNTER — TRANSCRIBE ORDERS (OUTPATIENT)
Dept: LAB | Facility: HOSPITAL | Age: 83
End: 2018-01-01

## 2018-01-01 ENCOUNTER — HOSPITAL ENCOUNTER (OUTPATIENT)
Dept: CT IMAGING | Facility: HOSPITAL | Age: 83
Discharge: HOME | End: 2018-12-14
Attending: INTERNAL MEDICINE | Admitting: INTERNAL MEDICINE

## 2018-01-01 ENCOUNTER — LAB (OUTPATIENT)
Dept: LAB | Facility: HOSPITAL | Age: 83
End: 2018-01-01
Attending: INTERNAL MEDICINE

## 2018-01-01 ENCOUNTER — TRANSCRIBE ORDERS (OUTPATIENT)
Dept: ADMINISTRATIVE | Facility: HOSPITAL | Age: 83
End: 2018-01-01

## 2018-01-01 DIAGNOSIS — R10.9 ABDOMINAL PAIN, UNSPECIFIED ABDOMINAL LOCATION: Primary | ICD-10-CM

## 2018-01-01 DIAGNOSIS — R10.9 ABDOMINAL PAIN, UNSPECIFIED ABDOMINAL LOCATION: ICD-10-CM

## 2018-01-01 LAB
ALBUMIN SERPL-MCNC: 4 G/DL (ref 3.5–5.2)
ALBUMIN/GLOB SERPL: 1.1 G/DL
ALP SERPL-CCNC: 90 U/L (ref 39–117)
ALT SERPL W P-5'-P-CCNC: 11 U/L (ref 1–33)
AMYLASE SERPL-CCNC: 241 U/L (ref 28–100)
ANION GAP SERPL CALCULATED.3IONS-SCNC: 15.4 MMOL/L
AST SERPL-CCNC: 26 U/L (ref 1–32)
BACTERIA SPEC AEROBE CULT: NORMAL
BACTERIA UR QL AUTO: ABNORMAL /HPF
BASOPHILS # BLD AUTO: 0.02 10*3/MM3 (ref 0–0.2)
BASOPHILS NFR BLD AUTO: 0.2 % (ref 0–1.5)
BILIRUB SERPL-MCNC: 0.3 MG/DL (ref 0.1–1.2)
BILIRUB UR QL STRIP: NEGATIVE
BUN BLD-MCNC: 20 MG/DL (ref 8–23)
BUN/CREAT SERPL: 13.7 (ref 7–25)
CALCIUM SPEC-SCNC: 10.1 MG/DL (ref 8.2–9.6)
CHLORIDE SERPL-SCNC: 99 MMOL/L (ref 98–107)
CLARITY UR: CLEAR
CO2 SERPL-SCNC: 26.6 MMOL/L (ref 22–29)
COLOR UR: YELLOW
CREAT BLD-MCNC: 1.46 MG/DL (ref 0.57–1)
DEPRECATED RDW RBC AUTO: 56.3 FL (ref 37–54)
EOSINOPHIL # BLD AUTO: 0.17 10*3/MM3 (ref 0–0.7)
EOSINOPHIL NFR BLD AUTO: 1.9 % (ref 0.3–6.2)
ERYTHROCYTE [DISTWIDTH] IN BLOOD BY AUTOMATED COUNT: 14.9 % (ref 11.7–13)
GFR SERPL CREATININE-BSD FRML MDRD: 33 ML/MIN/1.73
GLOBULIN UR ELPH-MCNC: 3.7 GM/DL
GLUCOSE BLD-MCNC: 105 MG/DL (ref 65–99)
GLUCOSE UR STRIP-MCNC: NEGATIVE MG/DL
HCT VFR BLD AUTO: 41.3 % (ref 35.6–45.5)
HGB BLD-MCNC: 13.1 G/DL (ref 11.9–15.5)
HGB UR QL STRIP.AUTO: NEGATIVE
HYALINE CASTS UR QL AUTO: ABNORMAL /LPF
IMM GRANULOCYTES # BLD: 0.02 10*3/MM3 (ref 0–0.03)
IMM GRANULOCYTES NFR BLD: 0.2 % (ref 0–0.5)
KETONES UR QL STRIP: NEGATIVE
LEUKOCYTE ESTERASE UR QL STRIP.AUTO: ABNORMAL
LYMPHOCYTES # BLD AUTO: 3.25 10*3/MM3 (ref 0.9–4.8)
LYMPHOCYTES NFR BLD AUTO: 36.1 % (ref 19.6–45.3)
MCH RBC QN AUTO: 32.9 PG (ref 26.9–32)
MCHC RBC AUTO-ENTMCNC: 31.7 G/DL (ref 32.4–36.3)
MCV RBC AUTO: 103.8 FL (ref 80.5–98.2)
MONOCYTES # BLD AUTO: 0.6 10*3/MM3 (ref 0.2–1.2)
MONOCYTES NFR BLD AUTO: 6.7 % (ref 5–12)
NEUTROPHILS # BLD AUTO: 4.95 10*3/MM3 (ref 1.9–8.1)
NEUTROPHILS NFR BLD AUTO: 54.9 % (ref 42.7–76)
NITRITE UR QL STRIP: NEGATIVE
NRBC BLD MANUAL-RTO: 0 /100 WBC (ref 0–0)
PH UR STRIP.AUTO: <=5 [PH] (ref 5–8)
PLATELET # BLD AUTO: 121 10*3/MM3 (ref 140–500)
PMV BLD AUTO: 9.5 FL (ref 6–12)
POTASSIUM BLD-SCNC: 4.4 MMOL/L (ref 3.5–5.2)
PROT SERPL-MCNC: 7.7 G/DL (ref 6–8.5)
PROT UR QL STRIP: NEGATIVE
RBC # BLD AUTO: 3.98 10*6/MM3 (ref 3.9–5.2)
RBC # UR: ABNORMAL /HPF
REF LAB TEST METHOD: ABNORMAL
SODIUM BLD-SCNC: 141 MMOL/L (ref 136–145)
SP GR UR STRIP: 1.02 (ref 1–1.03)
SQUAMOUS #/AREA URNS HPF: ABNORMAL /HPF
UROBILINOGEN UR QL STRIP: ABNORMAL
WBC NRBC COR # BLD: 9.01 10*3/MM3 (ref 4.5–10.7)
WBC UR QL AUTO: ABNORMAL /HPF

## 2018-01-01 PROCEDURE — 81001 URINALYSIS AUTO W/SCOPE: CPT

## 2018-01-01 PROCEDURE — 87086 URINE CULTURE/COLONY COUNT: CPT

## 2018-01-01 PROCEDURE — 80053 COMPREHEN METABOLIC PANEL: CPT

## 2018-01-01 PROCEDURE — 36415 COLL VENOUS BLD VENIPUNCTURE: CPT

## 2018-01-01 PROCEDURE — 82150 ASSAY OF AMYLASE: CPT

## 2018-01-01 PROCEDURE — 85025 COMPLETE CBC W/AUTO DIFF WBC: CPT

## 2018-01-01 PROCEDURE — 74176 CT ABD & PELVIS W/O CONTRAST: CPT

## 2018-01-30 RX ORDER — FUROSEMIDE 40 MG/1
TABLET ORAL
Qty: 90 TABLET | Refills: 1 | Status: SHIPPED | OUTPATIENT
Start: 2018-01-30 | End: 2018-06-25 | Stop reason: SDUPTHER

## 2018-04-16 ENCOUNTER — OFFICE VISIT (OUTPATIENT)
Dept: CARDIOLOGY | Facility: CLINIC | Age: 83
End: 2018-04-16

## 2018-04-16 VITALS
HEIGHT: 60 IN | DIASTOLIC BLOOD PRESSURE: 78 MMHG | WEIGHT: 121 LBS | BODY MASS INDEX: 23.75 KG/M2 | OXYGEN SATURATION: 96 % | HEART RATE: 50 BPM | SYSTOLIC BLOOD PRESSURE: 110 MMHG

## 2018-04-16 DIAGNOSIS — I35.0 AORTIC VALVE STENOSIS, ETIOLOGY OF CARDIAC VALVE DISEASE UNSPECIFIED: Primary | ICD-10-CM

## 2018-04-16 PROBLEM — I77.9 DISORDER OF AORTA (HCC): Status: ACTIVE | Noted: 2018-04-16

## 2018-04-16 PROCEDURE — 93000 ELECTROCARDIOGRAM COMPLETE: CPT | Performed by: PHYSICIAN ASSISTANT

## 2018-04-16 PROCEDURE — 99213 OFFICE O/P EST LOW 20 MIN: CPT | Performed by: PHYSICIAN ASSISTANT

## 2018-04-16 NOTE — PROGRESS NOTES
Date of Office Visit: 2018  Encounter Provider: JONATHAN Aleman  Place of Service: [facility] CARDIOLOGY  Patient Name: Isela Ewing  :1924    Chief Complaint   Patient presents with   • Cardiac Valve Problem     2 year follow up   :     HPI: Isela Ewing is a 89 y.o. female, new to me, who presents today for follow-up.  Old records have been obtained and reviewed by me.  She is a patient of Dr. Poe's with a cardiac history significant for aortic stenosis.  Her last echocardiogram was on 3/15/2017.  This showed normal LV function with an EF of 59%, mild aortic regurgitation, severe aortic stenosis, and moderate to severe mitral regurgitation.  It has been almost 3 years since she has been in our office to see Dr. Poe.  When she was last here in May 2015, she had complaints of fatigue.  Dr. Poe remarked that she would not be a very good candidate for a transcatheter aortic valve replacement secondary to her cardiac anatomy and her small size.  In addition to that time were for medical management.   Over the past several years and she's been doing well from a cardiac standpoint.  She has chronic shortness of breath that is stable.  She denies any chest pain, palpitations, edema, dizziness, or syncope.  She has no orthopnea or PND.  Her blood pressure is a little on the low side today, however she does not check it at home because her vision is very poor and she cannot read the blood pressure machine.  She is also unsure of what medications she is taking because she did not bring her list of her medications with her today.  Her son is with her today, and he thinks that she is only taking 4 medications.  This is much less than that we have on our list.    Past Medical History:   Diagnosis Date   • Aortic stenosis    • Arthritis    • Diverticulitis    • Glaucoma     macular degeneration   • Hyperlipidemia    • Hypertension    • Macular degeneration    •  UTI (urinary tract infection)        Past Surgical History:   Procedure Laterality Date   • APPENDECTOMY     • CHOLECYSTECTOMY     • COLON SURGERY     • HYSTERECTOMY         Social History     Social History   • Marital status:      Spouse name: N/A   • Number of children: N/A   • Years of education: N/A     Occupational History   • Not on file.     Social History Main Topics   • Smoking status: Never Smoker   • Smokeless tobacco: Never Used   • Alcohol use No   • Drug use: No   • Sexual activity: Defer     Other Topics Concern   • Not on file     Social History Narrative   • No narrative on file       Family History   Problem Relation Age of Onset   • Cancer Mother    • Heart disease Father    • No Known Problems Maternal Grandmother    • No Known Problems Maternal Grandfather    • No Known Problems Paternal Grandmother    • No Known Problems Paternal Grandfather        Review of Systems   Constitution: Negative for chills, fever and malaise/fatigue.   Cardiovascular: Positive for dyspnea on exertion. Negative for chest pain, leg swelling, near-syncope, orthopnea, palpitations, paroxysmal nocturnal dyspnea and syncope.   Respiratory: Negative for cough and shortness of breath.    Musculoskeletal: Negative for joint pain, joint swelling and myalgias.   Gastrointestinal: Negative for abdominal pain, diarrhea, melena, nausea and vomiting.   Genitourinary: Negative for frequency and hematuria.   Neurological: Negative for light-headedness, numbness, paresthesias and seizures.   Allergic/Immunologic: Negative.    All other systems reviewed and are negative.      Allergies   Allergen Reactions   • Amoxicillin    • Sulfa Antibiotics          Current Outpatient Prescriptions:   •  acetaminophen (TYLENOL) 325 MG tablet, Take 2 tablets by mouth every 4 (four) hours as needed for mild pain (1-3)., Disp: , Rfl: 0  •  albuterol (PROVENTIL) (2.5 MG/3ML) 0.083% nebulizer solution, Take 2.5 mg by nebulization Every 6 (Six)  "Hours As Needed for Shortness of Air., Disp: , Rfl: 12  •  allopurinol (ZYLOPRIM) 100 MG tablet, Take 100 mg by mouth daily., Disp: , Rfl:   •  amLODIPine (NORVASC) 2.5 MG tablet, Take 1 tablet by mouth Daily., Disp: , Rfl:   •  aspirin 81 MG EC tablet, Take 81 mg by mouth daily., Disp: , Rfl:   •  furosemide (LASIX) 40 MG tablet, TAKE 1 TABLET EVERY DAY, Disp: 90 tablet, Rfl: 1  •  isosorbide mononitrate (IMDUR) 30 MG 24 hr tablet, Take 1 tablet by mouth Daily., Disp: , Rfl:   •  latanoprost (XALATAN) 0.005 % ophthalmic solution, Administer 1 drop to both eyes Every Night., Disp: , Rfl:   •  metoprolol tartrate (LOPRESSOR) 25 MG tablet, Take 1 tablet by mouth 2 (Two) Times a Day., Disp: 60 tablet, Rfl: 0  •  raloxifene (EVISTA) 60 MG tablet, Take 60 mg by mouth daily., Disp: , Rfl:   •  tiotropium (SPIRIVA) 18 MCG per inhalation capsule, Place 1 capsule into inhaler and inhale 1 (one) time daily., Disp: , Rfl:       Objective:     Vitals:    04/16/18 0826 04/16/18 0844   BP: 102/70 110/78   BP Location: Right arm Left arm   Pulse: 50    SpO2: 96%    Weight: 54.9 kg (121 lb)    Height: 152.4 cm (60\")      Body mass index is 23.63 kg/m².    PHYSICAL EXAM:    Physical Exam   Constitutional: She is oriented to person, place, and time. She appears well-developed and well-nourished. No distress.   HENT:   Head: Normocephalic and atraumatic.   Eyes: Pupils are equal, round, and reactive to light.   Neck: No JVD present. No thyromegaly present.   Cardiovascular: Normal rate, regular rhythm and intact distal pulses.    Murmur heard.   Harsh midsystolic murmur is present with a grade of 3/6  at the upper right sternal border radiating to the neck  Pulmonary/Chest: Effort normal and breath sounds normal. No respiratory distress.   Abdominal: Soft. Bowel sounds are normal. She exhibits no distension. There is no splenomegaly or hepatomegaly. There is no tenderness.   Musculoskeletal: Normal range of motion. She exhibits no " edema.   Neurological: She is alert and oriented to person, place, and time.   Skin: Skin is warm and dry. She is not diaphoretic. No erythema.   Psychiatric: She has a normal mood and affect. Her behavior is normal. Judgment normal.         ECG 12 Lead  Date/Time: 4/16/2018 8:50 AM  Performed by: ANDRE CASTILLO.  Authorized by: ANDRE CASTILLO   Comparison: compared with previous ECG from 3/16/2017  Similar to previous ECG  Rhythm: sinus rhythm  BPM: 50  Conduction: 1st degree  Q waves: V1 and V2  Clinical impression: abnormal ECG  Comments: Indication: Aortic stenosis.              Assessment:       Diagnosis Plan   1. Aortic valve stenosis, etiology of cardiac valve disease unspecified  ECG 12 Lead     Orders Placed This Encounter   Procedures   • ECG 12 Lead     This order was created via procedure documentation          Plan:       Overall she stable from a cardiac standpoint.  She has no signs or symptoms of worsening valvular disease.  Her blood pressure is a little on the low side today for semi-her age, and I have asked her to call back with a list of her medications.  If there is any room to drop some blood pressure medications, I would recommend doing so.  However, I think she should remain on a beta blocker.  Right now I'm not going to make any changes to her medical regimen, and she will follow-up with Dr. Poe in 1 year or sooner if needed.    ADDENDUM:  The son called back with her updated medication list.  The only thing she is on for BP is metoprolol tartrate, and with her AS I do not want to discontinue this.  Continue current plan.    As always, it has been a pleasure to participate in your patient's care.      Sincerely,         Andre Castillo PA-C

## 2018-06-13 ENCOUNTER — APPOINTMENT (OUTPATIENT)
Dept: CT IMAGING | Facility: HOSPITAL | Age: 83
End: 2018-06-13
Attending: INTERNAL MEDICINE

## 2018-06-13 ENCOUNTER — TRANSCRIBE ORDERS (OUTPATIENT)
Dept: ADMINISTRATIVE | Facility: HOSPITAL | Age: 83
End: 2018-06-13

## 2018-06-13 DIAGNOSIS — K57.92 DIVERTICULITIS OF INTESTINE WITHOUT PERFORATION OR ABSCESS WITHOUT BLEEDING, UNSPECIFIED PART OF INTESTINAL TRACT: Primary | ICD-10-CM

## 2018-06-13 DIAGNOSIS — K57.32 DIVERTICULITIS OF COLON: Primary | ICD-10-CM

## 2018-06-14 ENCOUNTER — HOSPITAL ENCOUNTER (OUTPATIENT)
Dept: CT IMAGING | Facility: HOSPITAL | Age: 83
Discharge: HOME | End: 2018-06-14
Attending: INTERNAL MEDICINE | Admitting: INTERNAL MEDICINE

## 2018-06-14 DIAGNOSIS — K57.32 DIVERTICULITIS OF COLON: ICD-10-CM

## 2018-06-14 PROCEDURE — 74176 CT ABD & PELVIS W/O CONTRAST: CPT

## 2018-06-14 NOTE — NURSING NOTE
Dr. Brower called.  Ms. Ewing may go and he spoke with her daughter - in - law with the results.  Patient left via courtesy cart with daughter - in- law.

## 2018-06-26 RX ORDER — FUROSEMIDE 40 MG/1
TABLET ORAL
Qty: 90 TABLET | Refills: 2 | Status: SHIPPED | OUTPATIENT
Start: 2018-06-26 | End: 2019-01-01 | Stop reason: SDUPTHER

## 2018-12-15 NOTE — NURSING NOTE
Phone call from Dr. Brower re: CT A&P results. Spoke with pt's dtr-in-law and pt allowed to go home. Stable and in NAD.

## 2019-01-01 ENCOUNTER — APPOINTMENT (OUTPATIENT)
Dept: GENERAL RADIOLOGY | Facility: HOSPITAL | Age: 84
End: 2019-01-01

## 2019-01-01 ENCOUNTER — APPOINTMENT (OUTPATIENT)
Dept: CARDIOLOGY | Facility: HOSPITAL | Age: 84
End: 2019-01-01

## 2019-01-01 ENCOUNTER — ANESTHESIA EVENT (OUTPATIENT)
Dept: PERIOP | Facility: HOSPITAL | Age: 84
End: 2019-01-01

## 2019-01-01 ENCOUNTER — ANESTHESIA (OUTPATIENT)
Dept: PERIOP | Facility: HOSPITAL | Age: 84
End: 2019-01-01

## 2019-01-01 ENCOUNTER — HOSPITAL ENCOUNTER (INPATIENT)
Facility: HOSPITAL | Age: 84
LOS: 10 days | End: 2019-08-13
Attending: INTERNAL MEDICINE | Admitting: INTERNAL MEDICINE

## 2019-01-01 ENCOUNTER — HOSPITAL ENCOUNTER (INPATIENT)
Facility: HOSPITAL | Age: 84
LOS: 10 days | Discharge: HOSPICE | End: 2019-08-03
Attending: EMERGENCY MEDICINE | Admitting: ORTHOPAEDIC SURGERY

## 2019-01-01 VITALS
HEIGHT: 60 IN | BODY MASS INDEX: 25.45 KG/M2 | OXYGEN SATURATION: 95 % | SYSTOLIC BLOOD PRESSURE: 159 MMHG | RESPIRATION RATE: 16 BRPM | DIASTOLIC BLOOD PRESSURE: 73 MMHG | WEIGHT: 129.6 LBS | HEART RATE: 103 BPM | TEMPERATURE: 99 F

## 2019-01-01 VITALS — TEMPERATURE: 98.2 F

## 2019-01-01 DIAGNOSIS — S72.141A CLOSED INTERTROCHANTERIC FRACTURE OF RIGHT HIP, INITIAL ENCOUNTER (HCC): Primary | ICD-10-CM

## 2019-01-01 LAB
ABO + RH BLD: NORMAL
ABO + RH BLD: NORMAL
ABO GROUP BLD: NORMAL
ABO GROUP BLD: NORMAL
ALBUMIN SERPL-MCNC: 3.8 G/DL (ref 3.5–5.2)
ALBUMIN/GLOB SERPL: 1.1 G/DL
ALP SERPL-CCNC: 104 U/L (ref 39–117)
ALT SERPL W P-5'-P-CCNC: 18 U/L (ref 1–33)
ANION GAP SERPL CALCULATED.3IONS-SCNC: 10 MMOL/L (ref 5–15)
ANION GAP SERPL CALCULATED.3IONS-SCNC: 13.3 MMOL/L (ref 5–15)
ANION GAP SERPL CALCULATED.3IONS-SCNC: 13.5 MMOL/L (ref 5–15)
ANION GAP SERPL CALCULATED.3IONS-SCNC: 7.3 MMOL/L (ref 5–15)
ANION GAP SERPL CALCULATED.3IONS-SCNC: 7.3 MMOL/L (ref 5–15)
ANION GAP SERPL CALCULATED.3IONS-SCNC: 8 MMOL/L (ref 5–15)
ANION GAP SERPL CALCULATED.3IONS-SCNC: 9.3 MMOL/L (ref 5–15)
ANION GAP SERPL CALCULATED.3IONS-SCNC: 9.8 MMOL/L (ref 5–15)
AORTIC DIMENSIONLESS INDEX: 0.3 (DI)
APTT PPP: 29.5 SECONDS (ref 22.7–35.4)
AST SERPL-CCNC: 32 U/L (ref 1–32)
BASOPHILS # BLD AUTO: 0.01 10*3/MM3 (ref 0–0.2)
BASOPHILS # BLD AUTO: 0.02 10*3/MM3 (ref 0–0.2)
BASOPHILS # BLD AUTO: 0.03 10*3/MM3 (ref 0–0.2)
BASOPHILS # BLD AUTO: 0.04 10*3/MM3 (ref 0–0.2)
BASOPHILS # BLD AUTO: 0.04 10*3/MM3 (ref 0–0.2)
BASOPHILS # BLD AUTO: 0.05 10*3/MM3 (ref 0–0.2)
BASOPHILS # BLD AUTO: 0.05 10*3/MM3 (ref 0–0.2)
BASOPHILS # BLD AUTO: 0.06 10*3/MM3 (ref 0–0.2)
BASOPHILS NFR BLD AUTO: 0.1 % (ref 0–1.5)
BASOPHILS NFR BLD AUTO: 0.2 % (ref 0–1.5)
BASOPHILS NFR BLD AUTO: 0.3 % (ref 0–1.5)
BASOPHILS NFR BLD AUTO: 0.4 % (ref 0–1.5)
BASOPHILS NFR BLD AUTO: 0.4 % (ref 0–1.5)
BASOPHILS NFR BLD AUTO: 0.5 % (ref 0–1.5)
BASOPHILS NFR BLD AUTO: 0.5 % (ref 0–1.5)
BASOPHILS NFR BLD AUTO: 0.8 % (ref 0–1.5)
BH BB BLOOD EXPIRATION DATE: NORMAL
BH BB BLOOD EXPIRATION DATE: NORMAL
BH BB BLOOD TYPE BARCODE: 5100
BH BB BLOOD TYPE BARCODE: 5100
BH BB DISPENSE STATUS: NORMAL
BH BB DISPENSE STATUS: NORMAL
BH BB PRODUCT CODE: NORMAL
BH BB PRODUCT CODE: NORMAL
BH BB UNIT NUMBER: NORMAL
BH BB UNIT NUMBER: NORMAL
BH CV ECHO MEAS - ACS: 1.2 CM
BH CV ECHO MEAS - AI END-D VEL: 280 CM/SEC
BH CV ECHO MEAS - AI MAX PG: 85.7 MMHG
BH CV ECHO MEAS - AI MAX VEL: 463 CM/SEC
BH CV ECHO MEAS - AO MAX PG (FULL): 68 MMHG
BH CV ECHO MEAS - AO MAX PG: 74.3 MMHG
BH CV ECHO MEAS - AO MEAN PG (FULL): 42 MMHG
BH CV ECHO MEAS - AO MEAN PG: 46 MMHG
BH CV ECHO MEAS - AO ROOT AREA (BSA CORRECTED): 1.4
BH CV ECHO MEAS - AO ROOT AREA: 3.5 CM^2
BH CV ECHO MEAS - AO ROOT DIAM: 2.1 CM
BH CV ECHO MEAS - AO V2 MAX: 431 CM/SEC
BH CV ECHO MEAS - AO V2 MEAN: 316 CM/SEC
BH CV ECHO MEAS - AO V2 VTI: 119 CM
BH CV ECHO MEAS - AVA(I,A): 0.7 CM^2
BH CV ECHO MEAS - AVA(I,D): 0.7 CM^2
BH CV ECHO MEAS - AVA(V,A): 0.66 CM^2
BH CV ECHO MEAS - AVA(V,D): 0.66 CM^2
BH CV ECHO MEAS - BSA(HAYCOCK): 1.5 M^2
BH CV ECHO MEAS - BSA: 1.5 M^2
BH CV ECHO MEAS - BZI_BMI: 23.2 KILOGRAMS/M^2
BH CV ECHO MEAS - BZI_METRIC_HEIGHT: 152.4 CM
BH CV ECHO MEAS - BZI_METRIC_WEIGHT: 54 KG
BH CV ECHO MEAS - EDV(CUBED): 46.7 ML
BH CV ECHO MEAS - EDV(MOD-SP2): 50 ML
BH CV ECHO MEAS - EDV(MOD-SP4): 65 ML
BH CV ECHO MEAS - EDV(TEICH): 54.4 ML
BH CV ECHO MEAS - EF(CUBED): 73.9 %
BH CV ECHO MEAS - EF(MOD-BP): 72 %
BH CV ECHO MEAS - EF(MOD-SP2): 68 %
BH CV ECHO MEAS - EF(MOD-SP4): 80 %
BH CV ECHO MEAS - EF(TEICH): 66.7 %
BH CV ECHO MEAS - ESV(CUBED): 12.2 ML
BH CV ECHO MEAS - ESV(MOD-SP2): 16 ML
BH CV ECHO MEAS - ESV(MOD-SP4): 13 ML
BH CV ECHO MEAS - ESV(TEICH): 18.1 ML
BH CV ECHO MEAS - FS: 36.1 %
BH CV ECHO MEAS - IVS/LVPW: 1
BH CV ECHO MEAS - IVSD: 1 CM
BH CV ECHO MEAS - LAT PEAK E' VEL: 7 CM/SEC
BH CV ECHO MEAS - LV DIASTOLIC VOL/BSA (35-75): 43.4 ML/M^2
BH CV ECHO MEAS - LV MASS(C)D: 107.9 GRAMS
BH CV ECHO MEAS - LV MASS(C)DI: 72.1 GRAMS/M^2
BH CV ECHO MEAS - LV MAX PG: 6.4 MMHG
BH CV ECHO MEAS - LV MEAN PG: 4 MMHG
BH CV ECHO MEAS - LV SYSTOLIC VOL/BSA (12-30): 8.7 ML/M^2
BH CV ECHO MEAS - LV V1 MAX: 126 CM/SEC
BH CV ECHO MEAS - LV V1 MEAN: 92.3 CM/SEC
BH CV ECHO MEAS - LV V1 VTI: 36.7 CM
BH CV ECHO MEAS - LVIDD: 3.6 CM
BH CV ECHO MEAS - LVIDS: 2.3 CM
BH CV ECHO MEAS - LVLD AP2: 6.7 CM
BH CV ECHO MEAS - LVLD AP4: 7 CM
BH CV ECHO MEAS - LVLS AP2: 5.8 CM
BH CV ECHO MEAS - LVLS AP4: 4.8 CM
BH CV ECHO MEAS - LVOT AREA (M): 2.3 CM^2
BH CV ECHO MEAS - LVOT AREA: 2.3 CM^2
BH CV ECHO MEAS - LVOT DIAM: 1.7 CM
BH CV ECHO MEAS - LVPWD: 1 CM
BH CV ECHO MEAS - MED PEAK E' VEL: 7 CM/SEC
BH CV ECHO MEAS - MR MAX PG: 178.5 MMHG
BH CV ECHO MEAS - MR MAX VEL: 668 CM/SEC
BH CV ECHO MEAS - MV A DUR: 0.16 SEC
BH CV ECHO MEAS - MV A MAX VEL: 91.2 CM/SEC
BH CV ECHO MEAS - MV DEC SLOPE: 343.5 CM/SEC^2
BH CV ECHO MEAS - MV DEC TIME: 121 SEC
BH CV ECHO MEAS - MV E MAX VEL: 123 CM/SEC
BH CV ECHO MEAS - MV E/A: 1.3
BH CV ECHO MEAS - MV MAX PG: 10.9 MMHG
BH CV ECHO MEAS - MV MEAN PG: 3 MMHG
BH CV ECHO MEAS - MV P1/2T MAX VEL: 167 CM/SEC
BH CV ECHO MEAS - MV P1/2T: 142.4 MSEC
BH CV ECHO MEAS - MV V2 MAX: 165 CM/SEC
BH CV ECHO MEAS - MV V2 MEAN: 82.9 CM/SEC
BH CV ECHO MEAS - MV V2 VTI: 46.9 CM
BH CV ECHO MEAS - MVA P1/2T LCG: 1.3 CM^2
BH CV ECHO MEAS - MVA(P1/2T): 1.5 CM^2
BH CV ECHO MEAS - MVA(VTI): 1.8 CM^2
BH CV ECHO MEAS - PA ACC TIME: 0.15 SEC
BH CV ECHO MEAS - PA MAX PG (FULL): 1.7 MMHG
BH CV ECHO MEAS - PA MAX PG: 4.1 MMHG
BH CV ECHO MEAS - PA PR(ACCEL): 12.4 MMHG
BH CV ECHO MEAS - PA V2 MAX: 101 CM/SEC
BH CV ECHO MEAS - PULM A REVS DUR: 0.13 SEC
BH CV ECHO MEAS - PULM A REVS VEL: 39.8 CM/SEC
BH CV ECHO MEAS - PULM DIAS VEL: 33.5 CM/SEC
BH CV ECHO MEAS - PULM S/D: 2.3
BH CV ECHO MEAS - PULM SYS VEL: 78.1 CM/SEC
BH CV ECHO MEAS - PVA(V,A): 1.2 CM^2
BH CV ECHO MEAS - PVA(V,D): 1.2 CM^2
BH CV ECHO MEAS - QP/QS: 0.36
BH CV ECHO MEAS - RAP SYSTOLE: 15 MMHG
BH CV ECHO MEAS - RV MAX PG: 2.4 MMHG
BH CV ECHO MEAS - RV MEAN PG: 1 MMHG
BH CV ECHO MEAS - RV V1 MAX: 77.6 CM/SEC
BH CV ECHO MEAS - RV V1 MEAN: 57.2 CM/SEC
BH CV ECHO MEAS - RV V1 VTI: 19.4 CM
BH CV ECHO MEAS - RVOT AREA: 1.5 CM^2
BH CV ECHO MEAS - RVOT DIAM: 1.4 CM
BH CV ECHO MEAS - RVSP: 65.7 MMHG
BH CV ECHO MEAS - SI(AO): 275.3 ML/M^2
BH CV ECHO MEAS - SI(CUBED): 23 ML/M^2
BH CV ECHO MEAS - SI(LVOT): 55.6 ML/M^2
BH CV ECHO MEAS - SI(MOD-SP2): 22.7 ML/M^2
BH CV ECHO MEAS - SI(MOD-SP4): 34.7 ML/M^2
BH CV ECHO MEAS - SI(TEICH): 24.3 ML/M^2
BH CV ECHO MEAS - SV(AO): 412.2 ML
BH CV ECHO MEAS - SV(CUBED): 34.5 ML
BH CV ECHO MEAS - SV(LVOT): 83.3 ML
BH CV ECHO MEAS - SV(MOD-SP2): 34 ML
BH CV ECHO MEAS - SV(MOD-SP4): 52 ML
BH CV ECHO MEAS - SV(RVOT): 29.9 ML
BH CV ECHO MEAS - SV(TEICH): 36.3 ML
BH CV ECHO MEAS - TAPSE (>1.6): 1.9 CM2
BH CV ECHO MEAS - TR MAX VEL: 396 CM/SEC
BH CV ECHO MEASUREMENTS AVERAGE E/E' RATIO: 17.57
BH CV VAS BP RIGHT ARM: NORMAL MMHG
BH CV XLRA - RV BASE: 3 CM
BH CV XLRA - TDI S': 11 CM/SEC
BILIRUB SERPL-MCNC: 0.3 MG/DL (ref 0.2–1.2)
BLD GP AB SCN SERPL QL: NEGATIVE
BLD GP AB SCN SERPL QL: NEGATIVE
BUN BLD-MCNC: 16 MG/DL (ref 8–23)
BUN BLD-MCNC: 19 MG/DL (ref 8–23)
BUN BLD-MCNC: 20 MG/DL (ref 8–23)
BUN BLD-MCNC: 22 MG/DL (ref 8–23)
BUN BLD-MCNC: 28 MG/DL (ref 8–23)
BUN BLD-MCNC: 28 MG/DL (ref 8–23)
BUN BLD-MCNC: 31 MG/DL (ref 8–23)
BUN BLD-MCNC: 33 MG/DL (ref 8–23)
BUN/CREAT SERPL: 15.8 (ref 7–25)
BUN/CREAT SERPL: 18.5 (ref 7–25)
BUN/CREAT SERPL: 19.5 (ref 7–25)
BUN/CREAT SERPL: 23.7 (ref 7–25)
BUN/CREAT SERPL: 25.5 (ref 7–25)
BUN/CREAT SERPL: 25.6 (ref 7–25)
BUN/CREAT SERPL: 26.2 (ref 7–25)
BUN/CREAT SERPL: 29.7 (ref 7–25)
CALCIUM SPEC-SCNC: 8.3 MG/DL (ref 8.2–9.6)
CALCIUM SPEC-SCNC: 8.4 MG/DL (ref 8.2–9.6)
CALCIUM SPEC-SCNC: 8.5 MG/DL (ref 8.2–9.6)
CALCIUM SPEC-SCNC: 8.6 MG/DL (ref 8.2–9.6)
CALCIUM SPEC-SCNC: 8.7 MG/DL (ref 8.2–9.6)
CALCIUM SPEC-SCNC: 8.8 MG/DL (ref 8.2–9.6)
CALCIUM SPEC-SCNC: 8.9 MG/DL (ref 8.2–9.6)
CALCIUM SPEC-SCNC: 9.5 MG/DL (ref 8.2–9.6)
CHLORIDE SERPL-SCNC: 102 MMOL/L (ref 98–107)
CHLORIDE SERPL-SCNC: 102 MMOL/L (ref 98–107)
CHLORIDE SERPL-SCNC: 106 MMOL/L (ref 98–107)
CHLORIDE SERPL-SCNC: 106 MMOL/L (ref 98–107)
CHLORIDE SERPL-SCNC: 107 MMOL/L (ref 98–107)
CHLORIDE SERPL-SCNC: 108 MMOL/L (ref 98–107)
CHLORIDE SERPL-SCNC: 111 MMOL/L (ref 98–107)
CHLORIDE SERPL-SCNC: 98 MMOL/L (ref 98–107)
CO2 SERPL-SCNC: 23.7 MMOL/L (ref 22–29)
CO2 SERPL-SCNC: 24 MMOL/L (ref 22–29)
CO2 SERPL-SCNC: 25 MMOL/L (ref 22–29)
CO2 SERPL-SCNC: 25.2 MMOL/L (ref 22–29)
CO2 SERPL-SCNC: 27.5 MMOL/L (ref 22–29)
CO2 SERPL-SCNC: 27.7 MMOL/L (ref 22–29)
CREAT BLD-MCNC: 0.82 MG/DL (ref 0.57–1)
CREAT BLD-MCNC: 0.93 MG/DL (ref 0.57–1)
CREAT BLD-MCNC: 1.07 MG/DL (ref 0.57–1)
CREAT BLD-MCNC: 1.08 MG/DL (ref 0.57–1)
CREAT BLD-MCNC: 1.1 MG/DL (ref 0.57–1)
CREAT BLD-MCNC: 1.11 MG/DL (ref 0.57–1)
CREAT BLD-MCNC: 1.2 MG/DL (ref 0.57–1)
CREAT BLD-MCNC: 1.21 MG/DL (ref 0.57–1)
DEPRECATED RDW RBC AUTO: 52 FL (ref 37–54)
DEPRECATED RDW RBC AUTO: 53.3 FL (ref 37–54)
DEPRECATED RDW RBC AUTO: 53.6 FL (ref 37–54)
DEPRECATED RDW RBC AUTO: 53.7 FL (ref 37–54)
DEPRECATED RDW RBC AUTO: 54.1 FL (ref 37–54)
DEPRECATED RDW RBC AUTO: 54.4 FL (ref 37–54)
DEPRECATED RDW RBC AUTO: 55.1 FL (ref 37–54)
DEPRECATED RDW RBC AUTO: 55.5 FL (ref 37–54)
DEPRECATED RDW RBC AUTO: 56.2 FL (ref 37–54)
DIFFERENTIAL METHOD BLD: ABNORMAL
EOSINOPHIL # BLD AUTO: 0 10*3/MM3 (ref 0–0.4)
EOSINOPHIL # BLD AUTO: 0.03 10*3/MM3 (ref 0–0.4)
EOSINOPHIL # BLD AUTO: 0.21 10*3/MM3 (ref 0–0.4)
EOSINOPHIL # BLD AUTO: 0.29 10*3/MM3 (ref 0–0.4)
EOSINOPHIL # BLD AUTO: 0.43 10*3/MM3 (ref 0–0.4)
EOSINOPHIL # BLD AUTO: 0.51 10*3/MM3 (ref 0–0.4)
EOSINOPHIL # BLD AUTO: 0.53 10*3/MM3 (ref 0–0.4)
EOSINOPHIL # BLD AUTO: 0.53 10*3/MM3 (ref 0–0.4)
EOSINOPHIL NFR BLD AUTO: 0 % (ref 0.3–6.2)
EOSINOPHIL NFR BLD AUTO: 0.3 % (ref 0.3–6.2)
EOSINOPHIL NFR BLD AUTO: 2.6 % (ref 0.3–6.2)
EOSINOPHIL NFR BLD AUTO: 2.8 % (ref 0.3–6.2)
EOSINOPHIL NFR BLD AUTO: 4.5 % (ref 0.3–6.2)
EOSINOPHIL NFR BLD AUTO: 4.8 % (ref 0.3–6.2)
EOSINOPHIL NFR BLD AUTO: 5.7 % (ref 0.3–6.2)
EOSINOPHIL NFR BLD AUTO: 6.1 % (ref 0.3–6.2)
ERYTHROCYTE [DISTWIDTH] IN BLOOD BY AUTOMATED COUNT: 14.3 % (ref 12.3–15.4)
ERYTHROCYTE [DISTWIDTH] IN BLOOD BY AUTOMATED COUNT: 14.3 % (ref 12.3–15.4)
ERYTHROCYTE [DISTWIDTH] IN BLOOD BY AUTOMATED COUNT: 14.5 % (ref 12.3–15.4)
ERYTHROCYTE [DISTWIDTH] IN BLOOD BY AUTOMATED COUNT: 14.6 % (ref 12.3–15.4)
ERYTHROCYTE [DISTWIDTH] IN BLOOD BY AUTOMATED COUNT: 14.7 % (ref 12.3–15.4)
ERYTHROCYTE [DISTWIDTH] IN BLOOD BY AUTOMATED COUNT: 14.8 % (ref 12.3–15.4)
ERYTHROCYTE [DISTWIDTH] IN BLOOD BY AUTOMATED COUNT: 15.7 % (ref 12.3–15.4)
ERYTHROCYTE [DISTWIDTH] IN BLOOD BY AUTOMATED COUNT: 15.8 % (ref 12.3–15.4)
ERYTHROCYTE [DISTWIDTH] IN BLOOD BY AUTOMATED COUNT: 15.9 % (ref 12.3–15.4)
FERRITIN SERPL-MCNC: 213 NG/ML (ref 13–150)
FIBRINOGEN PPP-MCNC: 461 MG/DL (ref 219–464)
FOLATE SERPL-MCNC: >20 NG/ML (ref 4.78–24.2)
GFR SERPL CREATININE-BSD FRML MDRD: 41 ML/MIN/1.73
GFR SERPL CREATININE-BSD FRML MDRD: 42 ML/MIN/1.73
GFR SERPL CREATININE-BSD FRML MDRD: 46 ML/MIN/1.73
GFR SERPL CREATININE-BSD FRML MDRD: 46 ML/MIN/1.73
GFR SERPL CREATININE-BSD FRML MDRD: 47 ML/MIN/1.73
GFR SERPL CREATININE-BSD FRML MDRD: 48 ML/MIN/1.73
GFR SERPL CREATININE-BSD FRML MDRD: 56 ML/MIN/1.73
GFR SERPL CREATININE-BSD FRML MDRD: 65 ML/MIN/1.73
GLOBULIN UR ELPH-MCNC: 3.5 GM/DL
GLUCOSE BLD-MCNC: 107 MG/DL (ref 65–99)
GLUCOSE BLD-MCNC: 110 MG/DL (ref 65–99)
GLUCOSE BLD-MCNC: 118 MG/DL (ref 65–99)
GLUCOSE BLD-MCNC: 121 MG/DL (ref 65–99)
GLUCOSE BLD-MCNC: 130 MG/DL (ref 65–99)
GLUCOSE BLD-MCNC: 132 MG/DL (ref 65–99)
GLUCOSE BLD-MCNC: 135 MG/DL (ref 65–99)
GLUCOSE BLD-MCNC: 95 MG/DL (ref 65–99)
HCT VFR BLD AUTO: 20.9 % (ref 34–46.6)
HCT VFR BLD AUTO: 22.1 % (ref 34–46.6)
HCT VFR BLD AUTO: 22.8 % (ref 34–46.6)
HCT VFR BLD AUTO: 25.8 % (ref 34–46.6)
HCT VFR BLD AUTO: 27.1 % (ref 34–46.6)
HCT VFR BLD AUTO: 31.4 % (ref 34–46.6)
HCT VFR BLD AUTO: 31.9 % (ref 34–46.6)
HCT VFR BLD AUTO: 32.3 % (ref 34–46.6)
HCT VFR BLD AUTO: 39.4 % (ref 34–46.6)
HGB BLD-MCNC: 10.5 G/DL (ref 12–15.9)
HGB BLD-MCNC: 10.5 G/DL (ref 12–15.9)
HGB BLD-MCNC: 12.9 G/DL (ref 12–15.9)
HGB BLD-MCNC: 6.4 G/DL (ref 12–15.9)
HGB BLD-MCNC: 7.2 G/DL (ref 12–15.9)
HGB BLD-MCNC: 7.2 G/DL (ref 12–15.9)
HGB BLD-MCNC: 8.3 G/DL (ref 12–15.9)
HGB BLD-MCNC: 9 G/DL (ref 12–15.9)
HGB BLD-MCNC: 9.8 G/DL (ref 12–15.9)
HGB RETIC QN AUTO: 34.5 PG (ref 29.8–36.1)
IMM GRANULOCYTES # BLD AUTO: 0.04 10*3/MM3 (ref 0–0.05)
IMM GRANULOCYTES # BLD AUTO: 0.04 10*3/MM3 (ref 0–0.05)
IMM GRANULOCYTES # BLD AUTO: 0.05 10*3/MM3 (ref 0–0.05)
IMM GRANULOCYTES # BLD AUTO: 0.06 10*3/MM3 (ref 0–0.05)
IMM GRANULOCYTES NFR BLD AUTO: 0.4 % (ref 0–0.5)
IMM GRANULOCYTES NFR BLD AUTO: 0.4 % (ref 0–0.5)
IMM GRANULOCYTES NFR BLD AUTO: 0.5 % (ref 0–0.5)
IMM GRANULOCYTES NFR BLD AUTO: 0.7 % (ref 0–0.5)
IMM RETICS NFR: 31.7 % (ref 3–15.8)
INR PPP: 1.13 (ref 0.9–1.1)
IRON 24H UR-MRATE: 24 MCG/DL (ref 37–145)
IRON SATN MFR SERPL: 11 % (ref 20–50)
LEFT ATRIUM VOLUME INDEX: 28 ML/M2
LYMPHOCYTES # BLD AUTO: 0.78 10*3/MM3 (ref 0.7–3.1)
LYMPHOCYTES # BLD AUTO: 1.09 10*3/MM3 (ref 0.7–3.1)
LYMPHOCYTES # BLD AUTO: 1.34 10*3/MM3 (ref 0.7–3.1)
LYMPHOCYTES # BLD AUTO: 1.47 10*3/MM3 (ref 0.7–3.1)
LYMPHOCYTES # BLD AUTO: 1.47 10*3/MM3 (ref 0.7–3.1)
LYMPHOCYTES # BLD AUTO: 1.58 10*3/MM3 (ref 0.7–3.1)
LYMPHOCYTES # BLD AUTO: 1.76 10*3/MM3 (ref 0.7–3.1)
LYMPHOCYTES # BLD AUTO: 1.9 10*3/MM3 (ref 0.7–3.1)
LYMPHOCYTES NFR BLD AUTO: 13.2 % (ref 19.6–45.3)
LYMPHOCYTES NFR BLD AUTO: 14.4 % (ref 19.6–45.3)
LYMPHOCYTES NFR BLD AUTO: 15.9 % (ref 19.6–45.3)
LYMPHOCYTES NFR BLD AUTO: 16.6 % (ref 19.6–45.3)
LYMPHOCYTES NFR BLD AUTO: 17.6 % (ref 19.6–45.3)
LYMPHOCYTES NFR BLD AUTO: 25.3 % (ref 19.6–45.3)
LYMPHOCYTES NFR BLD AUTO: 8.2 % (ref 19.6–45.3)
LYMPHOCYTES NFR BLD AUTO: 9.7 % (ref 19.6–45.3)
MAXIMAL PREDICTED HEART RATE: 125 BPM
MCH RBC QN AUTO: 31.3 PG (ref 26.6–33)
MCH RBC QN AUTO: 31.8 PG (ref 26.6–33)
MCH RBC QN AUTO: 31.9 PG (ref 26.6–33)
MCH RBC QN AUTO: 32.1 PG (ref 26.6–33)
MCH RBC QN AUTO: 32.2 PG (ref 26.6–33)
MCH RBC QN AUTO: 32.2 PG (ref 26.6–33)
MCH RBC QN AUTO: 32.7 PG (ref 26.6–33)
MCH RBC QN AUTO: 33 PG (ref 26.6–33)
MCH RBC QN AUTO: 33.3 PG (ref 26.6–33)
MCHC RBC AUTO-ENTMCNC: 30.6 G/DL (ref 31.5–35.7)
MCHC RBC AUTO-ENTMCNC: 31.2 G/DL (ref 31.5–35.7)
MCHC RBC AUTO-ENTMCNC: 31.6 G/DL (ref 31.5–35.7)
MCHC RBC AUTO-ENTMCNC: 32.2 G/DL (ref 31.5–35.7)
MCHC RBC AUTO-ENTMCNC: 32.5 G/DL (ref 31.5–35.7)
MCHC RBC AUTO-ENTMCNC: 32.6 G/DL (ref 31.5–35.7)
MCHC RBC AUTO-ENTMCNC: 32.7 G/DL (ref 31.5–35.7)
MCHC RBC AUTO-ENTMCNC: 32.9 G/DL (ref 31.5–35.7)
MCHC RBC AUTO-ENTMCNC: 33.2 G/DL (ref 31.5–35.7)
MCV RBC AUTO: 100.8 FL (ref 79–97)
MCV RBC AUTO: 102.3 FL (ref 79–97)
MCV RBC AUTO: 103.3 FL (ref 79–97)
MCV RBC AUTO: 103.6 FL (ref 79–97)
MCV RBC AUTO: 105 FL (ref 79–97)
MCV RBC AUTO: 95.2 FL (ref 79–97)
MCV RBC AUTO: 96.1 FL (ref 79–97)
MCV RBC AUTO: 98.8 FL (ref 79–97)
MCV RBC AUTO: 98.9 FL (ref 79–97)
MONOCYTES # BLD AUTO: 0.42 10*3/MM3 (ref 0.1–0.9)
MONOCYTES # BLD AUTO: 0.45 10*3/MM3 (ref 0.1–0.9)
MONOCYTES # BLD AUTO: 0.7 10*3/MM3 (ref 0.1–0.9)
MONOCYTES # BLD AUTO: 0.8 10*3/MM3 (ref 0.1–0.9)
MONOCYTES # BLD AUTO: 0.81 10*3/MM3 (ref 0.1–0.9)
MONOCYTES # BLD AUTO: 0.83 10*3/MM3 (ref 0.1–0.9)
MONOCYTES # BLD AUTO: 0.86 10*3/MM3 (ref 0.1–0.9)
MONOCYTES # BLD AUTO: 0.97 10*3/MM3 (ref 0.1–0.9)
MONOCYTES NFR BLD AUTO: 4.7 % (ref 5–12)
MONOCYTES NFR BLD AUTO: 5.6 % (ref 5–12)
MONOCYTES NFR BLD AUTO: 6.2 % (ref 5–12)
MONOCYTES NFR BLD AUTO: 7.5 % (ref 5–12)
MONOCYTES NFR BLD AUTO: 8.4 % (ref 5–12)
MONOCYTES NFR BLD AUTO: 8.8 % (ref 5–12)
MONOCYTES NFR BLD AUTO: 9.2 % (ref 5–12)
MONOCYTES NFR BLD AUTO: 9.7 % (ref 5–12)
NEUTROPHILS # BLD AUTO: 4.87 10*3/MM3 (ref 1.7–7)
NEUTROPHILS # BLD AUTO: 5.44 10*3/MM3 (ref 1.7–7)
NEUTROPHILS # BLD AUTO: 6.49 10*3/MM3 (ref 1.7–7)
NEUTROPHILS # BLD AUTO: 6.6 10*3/MM3 (ref 1.7–7)
NEUTROPHILS # BLD AUTO: 7.72 10*3/MM3 (ref 1.7–7)
NEUTROPHILS # BLD AUTO: 8.21 10*3/MM3 (ref 1.7–7)
NEUTROPHILS # BLD AUTO: 8.45 10*3/MM3 (ref 1.7–7)
NEUTROPHILS # BLD AUTO: 9.37 10*3/MM3 (ref 1.7–7)
NEUTROPHILS NFR BLD AUTO: 65 % (ref 42.7–76)
NEUTROPHILS NFR BLD AUTO: 65.4 % (ref 42.7–76)
NEUTROPHILS NFR BLD AUTO: 69.5 % (ref 42.7–76)
NEUTROPHILS NFR BLD AUTO: 69.7 % (ref 42.7–76)
NEUTROPHILS NFR BLD AUTO: 69.8 % (ref 42.7–76)
NEUTROPHILS NFR BLD AUTO: 75.9 % (ref 42.7–76)
NEUTROPHILS NFR BLD AUTO: 83.2 % (ref 42.7–76)
NEUTROPHILS NFR BLD AUTO: 86.5 % (ref 42.7–76)
NRBC BLD AUTO-RTO: 0 /100 WBC (ref 0–0.2)
NRBC BLD AUTO-RTO: 0.1 /100 WBC (ref 0–0.2)
NRBC BLD AUTO-RTO: 0.3 /100 WBC (ref 0–0.2)
NRBC BLD AUTO-RTO: 0.5 /100 WBC (ref 0–0.2)
NRBC BLD AUTO-RTO: 0.7 /100 WBC (ref 0–0.2)
PF4 HEPARIN CMPLX AB SER-ACNC: 0.2 OD (ref 0–0.4)
PLATELET # BLD AUTO: 101 10*3/MM3 (ref 140–450)
PLATELET # BLD AUTO: 103 10*3/MM3 (ref 140–450)
PLATELET # BLD AUTO: 133 10*3/MM3 (ref 140–450)
PLATELET # BLD AUTO: 63 10*3/MM3 (ref 140–450)
PLATELET # BLD AUTO: 81 10*3/MM3 (ref 140–450)
PLATELET # BLD AUTO: 93 10*3/MM3 (ref 140–450)
PLATELET # BLD AUTO: 93 10*3/MM3 (ref 140–450)
PLATELET # BLD AUTO: 94 10*3/MM3 (ref 140–450)
PLATELETS.RETICULATED NFR BLD AUTO: 2.2 % (ref 0.9–6.5)
PMV BLD AUTO: 10.1 FL (ref 6–12)
PMV BLD AUTO: 10.1 FL (ref 6–12)
PMV BLD AUTO: 10.2 FL (ref 6–12)
PMV BLD AUTO: 10.2 FL (ref 6–12)
PMV BLD AUTO: 10.5 FL (ref 6–12)
PMV BLD AUTO: 10.5 FL (ref 6–12)
PMV BLD AUTO: 10.6 FL (ref 6–12)
PMV BLD AUTO: 10.7 FL (ref 6–12)
PMV BLD AUTO: 9.9 FL (ref 6–12)
POTASSIUM BLD-SCNC: 3.3 MMOL/L (ref 3.5–5.2)
POTASSIUM BLD-SCNC: 3.5 MMOL/L (ref 3.5–5.2)
POTASSIUM BLD-SCNC: 3.6 MMOL/L (ref 3.5–5.2)
POTASSIUM BLD-SCNC: 3.9 MMOL/L (ref 3.5–5.2)
POTASSIUM BLD-SCNC: 4.5 MMOL/L (ref 3.5–5.2)
POTASSIUM BLD-SCNC: 4.6 MMOL/L (ref 3.5–5.2)
POTASSIUM BLD-SCNC: 4.7 MMOL/L (ref 3.5–5.2)
POTASSIUM BLD-SCNC: 4.8 MMOL/L (ref 3.5–5.2)
PROT SERPL-MCNC: 7.3 G/DL (ref 6–8.5)
PROTHROMBIN TIME: 14.2 SECONDS (ref 11.7–14.2)
RBC # BLD AUTO: 1.99 10*6/MM3 (ref 3.77–5.28)
RBC # BLD AUTO: 2.16 10*6/MM3 (ref 3.77–5.28)
RBC # BLD AUTO: 2.2 10*6/MM3 (ref 3.77–5.28)
RBC # BLD AUTO: 2.61 10*6/MM3 (ref 3.77–5.28)
RBC # BLD AUTO: 2.82 10*6/MM3 (ref 3.77–5.28)
RBC # BLD AUTO: 3.04 10*6/MM3 (ref 3.77–5.28)
RBC # BLD AUTO: 3.27 10*6/MM3 (ref 3.77–5.28)
RBC # BLD AUTO: 3.35 10*6/MM3 (ref 3.77–5.28)
RBC # BLD AUTO: 3.91 10*6/MM3 (ref 3.77–5.28)
RETICS # AUTO: 0.06 10*6/MM3 (ref 0.02–0.13)
RETICS # AUTO: 0.08 10*6/MM3 (ref 0.02–0.13)
RETICS/RBC NFR AUTO: 2.82 % (ref 0.7–1.9)
RETICS/RBC NFR AUTO: 3 % (ref 0.7–1.9)
RETICS/RBC NFR AUTO: 3.47 % (ref 0.7–1.9)
RH BLD: POSITIVE
RH BLD: POSITIVE
SODIUM BLD-SCNC: 135 MMOL/L (ref 136–145)
SODIUM BLD-SCNC: 137 MMOL/L (ref 136–145)
SODIUM BLD-SCNC: 138 MMOL/L (ref 136–145)
SODIUM BLD-SCNC: 139 MMOL/L (ref 136–145)
SODIUM BLD-SCNC: 139 MMOL/L (ref 136–145)
SODIUM BLD-SCNC: 142 MMOL/L (ref 136–145)
SODIUM BLD-SCNC: 143 MMOL/L (ref 136–145)
SODIUM BLD-SCNC: 146 MMOL/L (ref 136–145)
STRESS TARGET HR: 106 BPM
T&S EXPIRATION DATE: NORMAL
T&S EXPIRATION DATE: NORMAL
TIBC SERPL-MCNC: 222 MCG/DL (ref 298–536)
TRANSFERRIN SERPL-MCNC: 149 MG/DL (ref 200–360)
UNIT  ABO: NORMAL
UNIT  ABO: NORMAL
UNIT  RH: NORMAL
UNIT  RH: NORMAL
VIT B12 BLD-MCNC: 473 PG/ML (ref 211–946)
WBC # BLD AUTO: 11.08 10*3/MM3 (ref 3.4–10.8)
WBC NRBC COR # BLD: 11.08 10*3/MM3 (ref 3.4–10.8)
WBC NRBC COR # BLD: 11.13 10*3/MM3 (ref 3.4–10.8)
WBC NRBC COR # BLD: 11.26 10*3/MM3 (ref 3.4–10.8)
WBC NRBC COR # BLD: 11.76 10*3/MM3 (ref 3.4–10.8)
WBC NRBC COR # BLD: 7.5 10*3/MM3 (ref 3.4–10.8)
WBC NRBC COR # BLD: 8.33 10*3/MM3 (ref 3.4–10.8)
WBC NRBC COR # BLD: 9.31 10*3/MM3 (ref 3.4–10.8)
WBC NRBC COR # BLD: 9.49 10*3/MM3 (ref 3.4–10.8)
WBC NRBC COR # BLD: 9.5 10*3/MM3 (ref 3.4–10.8)

## 2019-01-01 PROCEDURE — 86901 BLOOD TYPING SEROLOGIC RH(D): CPT | Performed by: EMERGENCY MEDICINE

## 2019-01-01 PROCEDURE — 99232 SBSQ HOSP IP/OBS MODERATE 35: CPT | Performed by: INTERNAL MEDICINE

## 2019-01-01 PROCEDURE — 97162 PT EVAL MOD COMPLEX 30 MIN: CPT

## 2019-01-01 PROCEDURE — 25010000002 HYDROMORPHONE 1 MG/ML SOLUTION: Performed by: INTERNAL MEDICINE

## 2019-01-01 PROCEDURE — 85025 COMPLETE CBC W/AUTO DIFF WBC: CPT | Performed by: HOSPITALIST

## 2019-01-01 PROCEDURE — 25010000002 ONDANSETRON PER 1 MG: Performed by: NURSE PRACTITIONER

## 2019-01-01 PROCEDURE — 83540 ASSAY OF IRON: CPT | Performed by: INTERNAL MEDICINE

## 2019-01-01 PROCEDURE — 25010000002 LORAZEPAM PER 2 MG: Performed by: INTERNAL MEDICINE

## 2019-01-01 PROCEDURE — 86900 BLOOD TYPING SEROLOGIC ABO: CPT | Performed by: HOSPITALIST

## 2019-01-01 PROCEDURE — 25010000002 MORPHINE PER 10 MG: Performed by: INTERNAL MEDICINE

## 2019-01-01 PROCEDURE — 86900 BLOOD TYPING SEROLOGIC ABO: CPT

## 2019-01-01 PROCEDURE — 25010000002 ONDANSETRON PER 1 MG: Performed by: NURSE ANESTHETIST, CERTIFIED REGISTERED

## 2019-01-01 PROCEDURE — 86923 COMPATIBILITY TEST ELECTRIC: CPT

## 2019-01-01 PROCEDURE — 25010000002 PROPOFOL 10 MG/ML EMULSION: Performed by: NURSE ANESTHETIST, CERTIFIED REGISTERED

## 2019-01-01 PROCEDURE — 99233 SBSQ HOSP IP/OBS HIGH 50: CPT | Performed by: INTERNAL MEDICINE

## 2019-01-01 PROCEDURE — 99239 HOSP IP/OBS DSCHRG MGMT >30: CPT | Performed by: INTERNAL MEDICINE

## 2019-01-01 PROCEDURE — 80048 BASIC METABOLIC PNL TOTAL CA: CPT | Performed by: HOSPITALIST

## 2019-01-01 PROCEDURE — 85610 PROTHROMBIN TIME: CPT | Performed by: INTERNAL MEDICINE

## 2019-01-01 PROCEDURE — 73501 X-RAY EXAM HIP UNI 1 VIEW: CPT

## 2019-01-01 PROCEDURE — 99231 SBSQ HOSP IP/OBS SF/LOW 25: CPT | Performed by: INTERNAL MEDICINE

## 2019-01-01 PROCEDURE — 93010 ELECTROCARDIOGRAM REPORT: CPT | Performed by: INTERNAL MEDICINE

## 2019-01-01 PROCEDURE — 85045 AUTOMATED RETICULOCYTE COUNT: CPT | Performed by: HOSPITALIST

## 2019-01-01 PROCEDURE — 93306 TTE W/DOPPLER COMPLETE: CPT

## 2019-01-01 PROCEDURE — 76000 FLUOROSCOPY <1 HR PHYS/QHP: CPT

## 2019-01-01 PROCEDURE — 84466 ASSAY OF TRANSFERRIN: CPT | Performed by: INTERNAL MEDICINE

## 2019-01-01 PROCEDURE — 97110 THERAPEUTIC EXERCISES: CPT

## 2019-01-01 PROCEDURE — 99232 SBSQ HOSP IP/OBS MODERATE 35: CPT | Performed by: PHYSICIAN ASSISTANT

## 2019-01-01 PROCEDURE — 25010000002 MORPHINE PER 10 MG: Performed by: HOSPITALIST

## 2019-01-01 PROCEDURE — C1713 ANCHOR/SCREW BN/BN,TIS/BN: HCPCS | Performed by: ORTHOPAEDIC SURGERY

## 2019-01-01 PROCEDURE — 85025 COMPLETE CBC W/AUTO DIFF WBC: CPT | Performed by: EMERGENCY MEDICINE

## 2019-01-01 PROCEDURE — 85055 RETICULATED PLATELET ASSAY: CPT | Performed by: HOSPITALIST

## 2019-01-01 PROCEDURE — C1769 GUIDE WIRE: HCPCS | Performed by: ORTHOPAEDIC SURGERY

## 2019-01-01 PROCEDURE — 82746 ASSAY OF FOLIC ACID SERUM: CPT | Performed by: INTERNAL MEDICINE

## 2019-01-01 PROCEDURE — 25010000002 LORAZEPAM PER 2 MG: Performed by: HOSPITALIST

## 2019-01-01 PROCEDURE — 80053 COMPREHEN METABOLIC PANEL: CPT | Performed by: EMERGENCY MEDICINE

## 2019-01-01 PROCEDURE — 36430 TRANSFUSION BLD/BLD COMPNT: CPT

## 2019-01-01 PROCEDURE — 25010000002 FENTANYL CITRATE (PF) 100 MCG/2ML SOLUTION: Performed by: NURSE ANESTHETIST, CERTIFIED REGISTERED

## 2019-01-01 PROCEDURE — 73502 X-RAY EXAM HIP UNI 2-3 VIEWS: CPT

## 2019-01-01 PROCEDURE — 86850 RBC ANTIBODY SCREEN: CPT | Performed by: HOSPITALIST

## 2019-01-01 PROCEDURE — 82607 VITAMIN B-12: CPT | Performed by: INTERNAL MEDICINE

## 2019-01-01 PROCEDURE — 86022 PLATELET ANTIBODIES: CPT | Performed by: HOSPITALIST

## 2019-01-01 PROCEDURE — 99221 1ST HOSP IP/OBS SF/LOW 40: CPT | Performed by: INTERNAL MEDICINE

## 2019-01-01 PROCEDURE — 82728 ASSAY OF FERRITIN: CPT | Performed by: INTERNAL MEDICINE

## 2019-01-01 PROCEDURE — 80048 BASIC METABOLIC PNL TOTAL CA: CPT | Performed by: NURSE PRACTITIONER

## 2019-01-01 PROCEDURE — 25010000002 DEXAMETHASONE PER 1 MG: Performed by: NURSE ANESTHETIST, CERTIFIED REGISTERED

## 2019-01-01 PROCEDURE — 71045 X-RAY EXAM CHEST 1 VIEW: CPT

## 2019-01-01 PROCEDURE — 99223 1ST HOSP IP/OBS HIGH 75: CPT | Performed by: INTERNAL MEDICINE

## 2019-01-01 PROCEDURE — 86900 BLOOD TYPING SEROLOGIC ABO: CPT | Performed by: EMERGENCY MEDICINE

## 2019-01-01 PROCEDURE — 85027 COMPLETE CBC AUTOMATED: CPT | Performed by: INTERNAL MEDICINE

## 2019-01-01 PROCEDURE — 25010000002 ONDANSETRON PER 1 MG: Performed by: PHYSICIAN ASSISTANT

## 2019-01-01 PROCEDURE — 85730 THROMBOPLASTIN TIME PARTIAL: CPT | Performed by: INTERNAL MEDICINE

## 2019-01-01 PROCEDURE — 25010000002 ONDANSETRON PER 1 MG: Performed by: EMERGENCY MEDICINE

## 2019-01-01 PROCEDURE — 99285 EMERGENCY DEPT VISIT HI MDM: CPT

## 2019-01-01 PROCEDURE — 0QS636Z REPOSITION RIGHT UPPER FEMUR WITH INTRAMEDULLARY INTERNAL FIXATION DEVICE, PERCUTANEOUS APPROACH: ICD-10-PCS | Performed by: ORTHOPAEDIC SURGERY

## 2019-01-01 PROCEDURE — 85384 FIBRINOGEN ACTIVITY: CPT | Performed by: INTERNAL MEDICINE

## 2019-01-01 PROCEDURE — 85046 RETICYTE/HGB CONCENTRATE: CPT | Performed by: INTERNAL MEDICINE

## 2019-01-01 PROCEDURE — 97110 THERAPEUTIC EXERCISES: CPT | Performed by: PHYSICAL THERAPIST

## 2019-01-01 PROCEDURE — 25010000002 IRON SUCROSE PER 1 MG: Performed by: HOSPITALIST

## 2019-01-01 PROCEDURE — 86850 RBC ANTIBODY SCREEN: CPT | Performed by: EMERGENCY MEDICINE

## 2019-01-01 PROCEDURE — 86901 BLOOD TYPING SEROLOGIC RH(D): CPT | Performed by: HOSPITALIST

## 2019-01-01 PROCEDURE — 93005 ELECTROCARDIOGRAM TRACING: CPT | Performed by: EMERGENCY MEDICINE

## 2019-01-01 PROCEDURE — P9016 RBC LEUKOCYTES REDUCED: HCPCS

## 2019-01-01 PROCEDURE — 85027 COMPLETE CBC AUTOMATED: CPT | Performed by: NURSE PRACTITIONER

## 2019-01-01 PROCEDURE — 93306 TTE W/DOPPLER COMPLETE: CPT | Performed by: INTERNAL MEDICINE

## 2019-01-01 PROCEDURE — 25010000002 MORPHINE PER 10 MG: Performed by: EMERGENCY MEDICINE

## 2019-01-01 PROCEDURE — 25010000002 HYDROMORPHONE PER 4 MG: Performed by: NURSE ANESTHETIST, CERTIFIED REGISTERED

## 2019-01-01 PROCEDURE — 73552 X-RAY EXAM OF FEMUR 2/>: CPT

## 2019-01-01 DEVICE — TRIGEN INTERTAN 11.5MM X 18CM 130DEGREE
Type: IMPLANTABLE DEVICE | Site: FEMUR | Status: FUNCTIONAL
Brand: TRIGEN

## 2019-01-01 DEVICE — INTERTAN LAG/COMPRESSION SCREW KIT                                    95MM / 90MM
Type: IMPLANTABLE DEVICE | Site: FEMUR | Status: FUNCTIONAL
Brand: TRIGEN

## 2019-01-01 DEVICE — TRIGEN LOW PROFILE SCREW 5.0MM X 32.5MM
Type: IMPLANTABLE DEVICE | Site: FEMUR | Status: FUNCTIONAL
Brand: TRIGEN

## 2019-01-01 RX ORDER — SODIUM CHLORIDE, SODIUM LACTATE, POTASSIUM CHLORIDE, CALCIUM CHLORIDE 600; 310; 30; 20 MG/100ML; MG/100ML; MG/100ML; MG/100ML
100 INJECTION, SOLUTION INTRAVENOUS CONTINUOUS
Status: DISCONTINUED | OUTPATIENT
Start: 2019-01-01 | End: 2019-01-01

## 2019-01-01 RX ORDER — HYDRALAZINE HYDROCHLORIDE 20 MG/ML
5 INJECTION INTRAMUSCULAR; INTRAVENOUS
Status: DISCONTINUED | OUTPATIENT
Start: 2019-01-01 | End: 2019-01-01 | Stop reason: HOSPADM

## 2019-01-01 RX ORDER — GLYCOPYRROLATE 0.2 MG/ML
0.2 INJECTION INTRAMUSCULAR; INTRAVENOUS
Status: CANCELLED | OUTPATIENT
Start: 2019-01-01

## 2019-01-01 RX ORDER — HALOPERIDOL 1 MG/1
1 TABLET ORAL EVERY 4 HOURS PRN
Status: DISCONTINUED | OUTPATIENT
Start: 2019-01-01 | End: 2019-01-01 | Stop reason: HOSPADM

## 2019-01-01 RX ORDER — EPHEDRINE SULFATE 50 MG/ML
5 INJECTION, SOLUTION INTRAVENOUS ONCE AS NEEDED
Status: DISCONTINUED | OUTPATIENT
Start: 2019-01-01 | End: 2019-01-01 | Stop reason: HOSPADM

## 2019-01-01 RX ORDER — GLYCOPYRROLATE 0.2 MG/ML
0.2 INJECTION INTRAMUSCULAR; INTRAVENOUS
Status: DISCONTINUED | OUTPATIENT
Start: 2019-01-01 | End: 2019-01-01

## 2019-01-01 RX ORDER — HYDROMORPHONE HCL 110MG/55ML
1.5 PATIENT CONTROLLED ANALGESIA SYRINGE INTRAVENOUS
Status: DISCONTINUED | OUTPATIENT
Start: 2019-01-01 | End: 2019-01-01 | Stop reason: HOSPADM

## 2019-01-01 RX ORDER — FUROSEMIDE 40 MG/1
TABLET ORAL
Qty: 90 TABLET | Refills: 2 | Status: SHIPPED | OUTPATIENT
Start: 2019-01-01

## 2019-01-01 RX ORDER — IPRATROPIUM BROMIDE AND ALBUTEROL SULFATE 2.5; .5 MG/3ML; MG/3ML
3 SOLUTION RESPIRATORY (INHALATION) ONCE AS NEEDED
Status: DISCONTINUED | OUTPATIENT
Start: 2019-01-01 | End: 2019-01-01 | Stop reason: HOSPADM

## 2019-01-01 RX ORDER — ONDANSETRON 2 MG/ML
4 INJECTION INTRAMUSCULAR; INTRAVENOUS ONCE AS NEEDED
Status: DISCONTINUED | OUTPATIENT
Start: 2019-01-01 | End: 2019-01-01 | Stop reason: HOSPADM

## 2019-01-01 RX ORDER — HALOPERIDOL 5 MG/ML
1 INJECTION INTRAMUSCULAR EVERY 4 HOURS PRN
Status: DISCONTINUED | OUTPATIENT
Start: 2019-01-01 | End: 2019-01-01 | Stop reason: HOSPADM

## 2019-01-01 RX ORDER — MORPHINE SULFATE 20 MG/ML
5 SOLUTION ORAL
Status: DISCONTINUED | OUTPATIENT
Start: 2019-01-01 | End: 2019-01-01 | Stop reason: HOSPADM

## 2019-01-01 RX ORDER — MORPHINE SULFATE 4 MG/ML
4 INJECTION, SOLUTION INTRAMUSCULAR; INTRAVENOUS
Status: CANCELLED | OUTPATIENT
Start: 2019-01-01 | End: 2019-01-01

## 2019-01-01 RX ORDER — GLYCOPYRROLATE 0.2 MG/ML
0.4 INJECTION INTRAMUSCULAR; INTRAVENOUS
Status: DISCONTINUED | OUTPATIENT
Start: 2019-01-01 | End: 2019-01-01 | Stop reason: HOSPADM

## 2019-01-01 RX ORDER — OXYCODONE HYDROCHLORIDE AND ACETAMINOPHEN 5; 325 MG/1; MG/1
1 TABLET ORAL EVERY 4 HOURS PRN
Status: DISCONTINUED | OUTPATIENT
Start: 2019-01-01 | End: 2019-01-01

## 2019-01-01 RX ORDER — LORAZEPAM 2 MG/ML
0.5 INJECTION INTRAMUSCULAR
Status: DISCONTINUED | OUTPATIENT
Start: 2019-01-01 | End: 2019-01-01 | Stop reason: HOSPADM

## 2019-01-01 RX ORDER — ACETAMINOPHEN 160 MG/5ML
650 SOLUTION ORAL EVERY 4 HOURS PRN
Status: DISCONTINUED | OUTPATIENT
Start: 2019-01-01 | End: 2019-01-01 | Stop reason: HOSPADM

## 2019-01-01 RX ORDER — PROMETHAZINE HYDROCHLORIDE 25 MG/ML
6.25 INJECTION, SOLUTION INTRAMUSCULAR; INTRAVENOUS EVERY 4 HOURS PRN
Status: DISCONTINUED | OUTPATIENT
Start: 2019-01-01 | End: 2019-01-01

## 2019-01-01 RX ORDER — SODIUM CHLORIDE 9 MG/ML
75 INJECTION, SOLUTION INTRAVENOUS CONTINUOUS
Status: DISCONTINUED | OUTPATIENT
Start: 2019-01-01 | End: 2019-01-01

## 2019-01-01 RX ORDER — FLUMAZENIL 0.1 MG/ML
0.2 INJECTION INTRAVENOUS AS NEEDED
Status: DISCONTINUED | OUTPATIENT
Start: 2019-01-01 | End: 2019-01-01 | Stop reason: HOSPADM

## 2019-01-01 RX ORDER — HALOPERIDOL 2 MG/ML
2 SOLUTION ORAL EVERY 4 HOURS PRN
Status: CANCELLED | OUTPATIENT
Start: 2019-01-01

## 2019-01-01 RX ORDER — MORPHINE SULFATE 20 MG/ML
5 SOLUTION ORAL
Status: CANCELLED | OUTPATIENT
Start: 2019-01-01 | End: 2019-01-01

## 2019-01-01 RX ORDER — MAGNESIUM HYDROXIDE 1200 MG/15ML
LIQUID ORAL AS NEEDED
Status: DISCONTINUED | OUTPATIENT
Start: 2019-01-01 | End: 2019-01-01 | Stop reason: HOSPADM

## 2019-01-01 RX ORDER — ACETAMINOPHEN 325 MG/1
650 TABLET ORAL ONCE AS NEEDED
Status: DISCONTINUED | OUTPATIENT
Start: 2019-01-01 | End: 2019-01-01 | Stop reason: HOSPADM

## 2019-01-01 RX ORDER — LORAZEPAM 2 MG/ML
1 CONCENTRATE ORAL
Status: DISCONTINUED | OUTPATIENT
Start: 2019-01-01 | End: 2019-01-01 | Stop reason: HOSPADM

## 2019-01-01 RX ORDER — LATANOPROST 50 UG/ML
1 SOLUTION/ DROPS OPHTHALMIC NIGHTLY
Status: DISCONTINUED | OUTPATIENT
Start: 2019-01-01 | End: 2019-01-01

## 2019-01-01 RX ORDER — PROPOFOL 10 MG/ML
VIAL (ML) INTRAVENOUS AS NEEDED
Status: DISCONTINUED | OUTPATIENT
Start: 2019-01-01 | End: 2019-01-01 | Stop reason: SURG

## 2019-01-01 RX ORDER — MORPHINE SULFATE 20 MG/ML
20 SOLUTION ORAL
Status: ACTIVE | OUTPATIENT
Start: 2019-01-01 | End: 2019-01-01

## 2019-01-01 RX ORDER — MORPHINE SULFATE 10 MG/ML
6 INJECTION INTRAMUSCULAR; INTRAVENOUS; SUBCUTANEOUS
Status: DISCONTINUED | OUTPATIENT
Start: 2019-01-01 | End: 2019-01-01 | Stop reason: HOSPADM

## 2019-01-01 RX ORDER — ASPIRIN 81 MG/1
81 TABLET ORAL DAILY
Status: DISCONTINUED | OUTPATIENT
Start: 2019-01-01 | End: 2019-01-01

## 2019-01-01 RX ORDER — LATANOPROST 50 UG/ML
1 SOLUTION/ DROPS OPHTHALMIC NIGHTLY
Status: DISCONTINUED | OUTPATIENT
Start: 2019-01-01 | End: 2019-01-01 | Stop reason: HOSPADM

## 2019-01-01 RX ORDER — HALOPERIDOL 5 MG/ML
2 INJECTION INTRAMUSCULAR EVERY 4 HOURS PRN
Status: DISCONTINUED | OUTPATIENT
Start: 2019-01-01 | End: 2019-01-01 | Stop reason: HOSPADM

## 2019-01-01 RX ORDER — LORAZEPAM 2 MG/ML
1 INJECTION INTRAMUSCULAR
Status: DISCONTINUED | OUTPATIENT
Start: 2019-01-01 | End: 2019-01-01 | Stop reason: HOSPADM

## 2019-01-01 RX ORDER — HALOPERIDOL 2 MG/ML
2 SOLUTION ORAL EVERY 4 HOURS PRN
Status: DISCONTINUED | OUTPATIENT
Start: 2019-01-01 | End: 2019-01-01 | Stop reason: HOSPADM

## 2019-01-01 RX ORDER — ACETAMINOPHEN 160 MG/5ML
650 SOLUTION ORAL EVERY 4 HOURS PRN
Status: CANCELLED | OUTPATIENT
Start: 2019-01-01

## 2019-01-01 RX ORDER — MORPHINE SULFATE 4 MG/ML
4 INJECTION, SOLUTION INTRAMUSCULAR; INTRAVENOUS
Status: DISCONTINUED | OUTPATIENT
Start: 2019-01-01 | End: 2019-01-01 | Stop reason: HOSPADM

## 2019-01-01 RX ORDER — MORPHINE SULFATE 10 MG/ML
6 INJECTION INTRAMUSCULAR; INTRAVENOUS; SUBCUTANEOUS
Status: DISPENSED | OUTPATIENT
Start: 2019-01-01 | End: 2019-01-01

## 2019-01-01 RX ORDER — ACETAMINOPHEN 325 MG/1
650 TABLET ORAL EVERY 4 HOURS PRN
Status: DISCONTINUED | OUTPATIENT
Start: 2019-01-01 | End: 2019-01-01 | Stop reason: HOSPADM

## 2019-01-01 RX ORDER — DEXAMETHASONE SODIUM PHOSPHATE 10 MG/ML
INJECTION INTRAMUSCULAR; INTRAVENOUS AS NEEDED
Status: DISCONTINUED | OUTPATIENT
Start: 2019-01-01 | End: 2019-01-01 | Stop reason: SURG

## 2019-01-01 RX ORDER — LORAZEPAM 2 MG/ML
2 INJECTION INTRAMUSCULAR
Status: CANCELLED | OUTPATIENT
Start: 2019-01-01 | End: 2019-01-01

## 2019-01-01 RX ORDER — DIPHENHYDRAMINE HYDROCHLORIDE 50 MG/ML
12.5 INJECTION INTRAMUSCULAR; INTRAVENOUS
Status: DISCONTINUED | OUTPATIENT
Start: 2019-01-01 | End: 2019-01-01 | Stop reason: HOSPADM

## 2019-01-01 RX ORDER — ONDANSETRON 2 MG/ML
4 INJECTION INTRAMUSCULAR; INTRAVENOUS ONCE
Status: COMPLETED | OUTPATIENT
Start: 2019-01-01 | End: 2019-01-01

## 2019-01-01 RX ORDER — ACETAMINOPHEN 650 MG/1
650 SUPPOSITORY RECTAL EVERY 4 HOURS PRN
Status: DISCONTINUED | OUTPATIENT
Start: 2019-01-01 | End: 2019-01-01 | Stop reason: HOSPADM

## 2019-01-01 RX ORDER — FUROSEMIDE 40 MG/1
40 TABLET ORAL ONCE
Status: COMPLETED | OUTPATIENT
Start: 2019-01-01 | End: 2019-01-01

## 2019-01-01 RX ORDER — LORAZEPAM 2 MG/ML
0.5 CONCENTRATE ORAL
Status: CANCELLED | OUTPATIENT
Start: 2019-01-01 | End: 2019-01-01

## 2019-01-01 RX ORDER — GLYCOPYRROLATE 0.2 MG/ML
0.4 INJECTION INTRAMUSCULAR; INTRAVENOUS
Status: DISCONTINUED | OUTPATIENT
Start: 2019-01-01 | End: 2019-01-01

## 2019-01-01 RX ORDER — HALOPERIDOL 2 MG/ML
1 SOLUTION ORAL EVERY 4 HOURS PRN
Status: DISCONTINUED | OUTPATIENT
Start: 2019-01-01 | End: 2019-01-01 | Stop reason: HOSPADM

## 2019-01-01 RX ORDER — LIDOCAINE HYDROCHLORIDE 10 MG/ML
0.5 INJECTION, SOLUTION EPIDURAL; INFILTRATION; INTRACAUDAL; PERINEURAL ONCE AS NEEDED
Status: DISCONTINUED | OUTPATIENT
Start: 2019-01-01 | End: 2019-01-01 | Stop reason: HOSPADM

## 2019-01-01 RX ORDER — ACETAMINOPHEN 325 MG/1
650 TABLET ORAL EVERY 4 HOURS PRN
Status: CANCELLED | OUTPATIENT
Start: 2019-01-01

## 2019-01-01 RX ORDER — FUROSEMIDE 40 MG/1
40 TABLET ORAL DAILY
Status: DISCONTINUED | OUTPATIENT
Start: 2019-01-01 | End: 2019-01-01

## 2019-01-01 RX ORDER — NALOXONE HCL 0.4 MG/ML
0.2 VIAL (ML) INJECTION AS NEEDED
Status: DISCONTINUED | OUTPATIENT
Start: 2019-01-01 | End: 2019-01-01 | Stop reason: HOSPADM

## 2019-01-01 RX ORDER — FENTANYL CITRATE 50 UG/ML
INJECTION, SOLUTION INTRAMUSCULAR; INTRAVENOUS AS NEEDED
Status: DISCONTINUED | OUTPATIENT
Start: 2019-01-01 | End: 2019-01-01 | Stop reason: SURG

## 2019-01-01 RX ORDER — MORPHINE SULFATE 20 MG/ML
10 SOLUTION ORAL
Status: CANCELLED | OUTPATIENT
Start: 2019-01-01 | End: 2019-01-01

## 2019-01-01 RX ORDER — HYDROMORPHONE HCL 110MG/55ML
1.5 PATIENT CONTROLLED ANALGESIA SYRINGE INTRAVENOUS
Status: CANCELLED | OUTPATIENT
Start: 2019-01-01 | End: 2019-01-01

## 2019-01-01 RX ORDER — SCOLOPAMINE TRANSDERMAL SYSTEM 1 MG/1
1 PATCH, EXTENDED RELEASE TRANSDERMAL
Status: DISCONTINUED | OUTPATIENT
Start: 2019-01-01 | End: 2019-01-01 | Stop reason: HOSPADM

## 2019-01-01 RX ORDER — DIPHENOXYLATE HYDROCHLORIDE AND ATROPINE SULFATE 2.5; .025 MG/1; MG/1
1 TABLET ORAL
Status: DISCONTINUED | OUTPATIENT
Start: 2019-01-01 | End: 2019-01-01 | Stop reason: HOSPADM

## 2019-01-01 RX ORDER — LORAZEPAM 2 MG/ML
2 INJECTION INTRAMUSCULAR
Status: DISCONTINUED | OUTPATIENT
Start: 2019-01-01 | End: 2019-01-01 | Stop reason: HOSPADM

## 2019-01-01 RX ORDER — ONDANSETRON 2 MG/ML
INJECTION INTRAMUSCULAR; INTRAVENOUS
Status: DISPENSED
Start: 2019-01-01 | End: 2019-01-01

## 2019-01-01 RX ORDER — HYDROMORPHONE HYDROCHLORIDE 1 MG/ML
0.5 INJECTION, SOLUTION INTRAMUSCULAR; INTRAVENOUS; SUBCUTANEOUS
Status: ACTIVE | OUTPATIENT
Start: 2019-01-01 | End: 2019-01-01

## 2019-01-01 RX ORDER — PROMETHAZINE HYDROCHLORIDE 6.25 MG/5ML
6.25 SYRUP ORAL EVERY 4 HOURS PRN
Status: DISCONTINUED | OUTPATIENT
Start: 2019-01-01 | End: 2019-01-01

## 2019-01-01 RX ORDER — SODIUM CHLORIDE 0.9 % (FLUSH) 0.9 %
3 SYRINGE (ML) INJECTION EVERY 12 HOURS SCHEDULED
Status: DISCONTINUED | OUTPATIENT
Start: 2019-01-01 | End: 2019-01-01

## 2019-01-01 RX ORDER — ONDANSETRON 2 MG/ML
INJECTION INTRAMUSCULAR; INTRAVENOUS AS NEEDED
Status: DISCONTINUED | OUTPATIENT
Start: 2019-01-01 | End: 2019-01-01 | Stop reason: SURG

## 2019-01-01 RX ORDER — HYDROMORPHONE HYDROCHLORIDE 1 MG/ML
0.25 INJECTION, SOLUTION INTRAMUSCULAR; INTRAVENOUS; SUBCUTANEOUS
Status: DISCONTINUED | OUTPATIENT
Start: 2019-01-01 | End: 2019-01-01 | Stop reason: HOSPADM

## 2019-01-01 RX ORDER — MORPHINE SULFATE 20 MG/ML
20 SOLUTION ORAL
Status: DISCONTINUED | OUTPATIENT
Start: 2019-01-01 | End: 2019-01-01 | Stop reason: HOSPADM

## 2019-01-01 RX ORDER — LORAZEPAM 2 MG/ML
0.5 CONCENTRATE ORAL
Status: DISCONTINUED | OUTPATIENT
Start: 2019-01-01 | End: 2019-01-01 | Stop reason: HOSPADM

## 2019-01-01 RX ORDER — LORAZEPAM 2 MG/ML
0.5 INJECTION INTRAMUSCULAR
Status: CANCELLED | OUTPATIENT
Start: 2019-01-01 | End: 2019-01-01

## 2019-01-01 RX ORDER — VANCOMYCIN HYDROCHLORIDE 1 G/200ML
1 INJECTION, SOLUTION INTRAVENOUS ONCE
Status: DISCONTINUED | OUTPATIENT
Start: 2019-01-01 | End: 2019-01-01 | Stop reason: HOSPADM

## 2019-01-01 RX ORDER — ACETAMINOPHEN 325 MG/1
650 TABLET ORAL EVERY 4 HOURS PRN
Status: DISCONTINUED | OUTPATIENT
Start: 2019-01-01 | End: 2019-01-01

## 2019-01-01 RX ORDER — POTASSIUM CHLORIDE 750 MG/1
40 CAPSULE, EXTENDED RELEASE ORAL ONCE
Status: COMPLETED | OUTPATIENT
Start: 2019-01-01 | End: 2019-01-01

## 2019-01-01 RX ORDER — MORPHINE SULFATE 10 MG/ML
6 INJECTION INTRAMUSCULAR; INTRAVENOUS; SUBCUTANEOUS
Status: CANCELLED | OUTPATIENT
Start: 2019-01-01 | End: 2019-01-01

## 2019-01-01 RX ORDER — SODIUM CHLORIDE 0.9 % (FLUSH) 0.9 %
1-10 SYRINGE (ML) INJECTION AS NEEDED
Status: DISCONTINUED | OUTPATIENT
Start: 2019-01-01 | End: 2019-01-01

## 2019-01-01 RX ORDER — MORPHINE SULFATE 2 MG/ML
2 INJECTION, SOLUTION INTRAMUSCULAR; INTRAVENOUS
Status: DISPENSED | OUTPATIENT
Start: 2019-01-01 | End: 2019-01-01

## 2019-01-01 RX ORDER — HALOPERIDOL 5 MG/ML
1 INJECTION INTRAMUSCULAR EVERY 4 HOURS PRN
Status: CANCELLED | OUTPATIENT
Start: 2019-01-01

## 2019-01-01 RX ORDER — FAMOTIDINE 10 MG/ML
20 INJECTION, SOLUTION INTRAVENOUS ONCE
Status: COMPLETED | OUTPATIENT
Start: 2019-01-01 | End: 2019-01-01

## 2019-01-01 RX ORDER — ALLOPURINOL 300 MG/1
300 TABLET ORAL DAILY
Status: ON HOLD | COMMUNITY
End: 2019-01-01

## 2019-01-01 RX ORDER — HALOPERIDOL 1 MG/1
2 TABLET ORAL EVERY 4 HOURS PRN
Status: CANCELLED | OUTPATIENT
Start: 2019-01-01

## 2019-01-01 RX ORDER — BISACODYL 10 MG
10 SUPPOSITORY, RECTAL RECTAL DAILY
Status: DISCONTINUED | OUTPATIENT
Start: 2019-01-01 | End: 2019-01-01

## 2019-01-01 RX ORDER — HALOPERIDOL 1 MG/1
2 TABLET ORAL EVERY 4 HOURS PRN
Status: DISCONTINUED | OUTPATIENT
Start: 2019-01-01 | End: 2019-01-01 | Stop reason: HOSPADM

## 2019-01-01 RX ORDER — GLYCOPYRROLATE 0.2 MG/ML
0.2 INJECTION INTRAMUSCULAR; INTRAVENOUS
Status: DISCONTINUED | OUTPATIENT
Start: 2019-01-01 | End: 2019-01-01 | Stop reason: HOSPADM

## 2019-01-01 RX ORDER — ROCURONIUM BROMIDE 10 MG/ML
INJECTION, SOLUTION INTRAVENOUS AS NEEDED
Status: DISCONTINUED | OUTPATIENT
Start: 2019-01-01 | End: 2019-01-01 | Stop reason: SURG

## 2019-01-01 RX ORDER — SODIUM CHLORIDE 0.9 % (FLUSH) 0.9 %
10 SYRINGE (ML) INJECTION AS NEEDED
Status: DISCONTINUED | OUTPATIENT
Start: 2019-01-01 | End: 2019-01-01

## 2019-01-01 RX ORDER — ACETAMINOPHEN 650 MG/1
650 SUPPOSITORY RECTAL EVERY 4 HOURS PRN
Status: CANCELLED | OUTPATIENT
Start: 2019-01-01

## 2019-01-01 RX ORDER — MORPHINE SULFATE 2 MG/ML
2 INJECTION, SOLUTION INTRAMUSCULAR; INTRAVENOUS
Status: DISCONTINUED | OUTPATIENT
Start: 2019-01-01 | End: 2019-01-01 | Stop reason: HOSPADM

## 2019-01-01 RX ORDER — MORPHINE SULFATE 20 MG/ML
10 SOLUTION ORAL
Status: ACTIVE | OUTPATIENT
Start: 2019-01-01 | End: 2019-01-01

## 2019-01-01 RX ORDER — FENTANYL CITRATE 50 UG/ML
25 INJECTION, SOLUTION INTRAMUSCULAR; INTRAVENOUS
Status: DISCONTINUED | OUTPATIENT
Start: 2019-01-01 | End: 2019-01-01 | Stop reason: HOSPADM

## 2019-01-01 RX ORDER — LORAZEPAM 2 MG/ML
2 CONCENTRATE ORAL
Status: DISCONTINUED | OUTPATIENT
Start: 2019-01-01 | End: 2019-01-01 | Stop reason: HOSPADM

## 2019-01-01 RX ORDER — MORPHINE SULFATE 20 MG/ML
10 SOLUTION ORAL
Status: DISCONTINUED | OUTPATIENT
Start: 2019-01-01 | End: 2019-01-01 | Stop reason: HOSPADM

## 2019-01-01 RX ORDER — ALLOPURINOL 300 MG/1
300 TABLET ORAL DAILY
Status: DISCONTINUED | OUTPATIENT
Start: 2019-01-01 | End: 2019-01-01

## 2019-01-01 RX ORDER — PROMETHAZINE HYDROCHLORIDE 25 MG/1
12.5 TABLET ORAL EVERY 4 HOURS PRN
Status: DISCONTINUED | OUTPATIENT
Start: 2019-01-01 | End: 2019-01-01

## 2019-01-01 RX ORDER — MORPHINE SULFATE 20 MG/ML
20 SOLUTION ORAL
Status: CANCELLED | OUTPATIENT
Start: 2019-01-01 | End: 2019-01-01

## 2019-01-01 RX ORDER — LORAZEPAM 2 MG/ML
0.5 INJECTION INTRAMUSCULAR
Status: DISPENSED | OUTPATIENT
Start: 2019-01-01 | End: 2019-01-01

## 2019-01-01 RX ORDER — PROMETHAZINE HYDROCHLORIDE 6.25 MG/5ML
12.5 SYRUP ORAL EVERY 4 HOURS PRN
Status: DISCONTINUED | OUTPATIENT
Start: 2019-01-01 | End: 2019-01-01

## 2019-01-01 RX ORDER — NITROGLYCERIN 0.4 MG/1
0.4 TABLET SUBLINGUAL
Status: DISCONTINUED | OUTPATIENT
Start: 2019-01-01 | End: 2019-01-01

## 2019-01-01 RX ORDER — LORAZEPAM 2 MG/ML
1 INJECTION INTRAMUSCULAR
Status: ACTIVE | OUTPATIENT
Start: 2019-01-01 | End: 2019-01-01

## 2019-01-01 RX ORDER — PROMETHAZINE HYDROCHLORIDE 25 MG/ML
12.5 INJECTION, SOLUTION INTRAMUSCULAR; INTRAVENOUS EVERY 4 HOURS PRN
Status: DISCONTINUED | OUTPATIENT
Start: 2019-01-01 | End: 2019-01-01

## 2019-01-01 RX ORDER — DEXTROSE AND SODIUM CHLORIDE 5; .9 G/100ML; G/100ML
50 INJECTION, SOLUTION INTRAVENOUS CONTINUOUS
Status: DISCONTINUED | OUTPATIENT
Start: 2019-01-01 | End: 2019-01-01

## 2019-01-01 RX ORDER — LORAZEPAM 2 MG/ML
2 CONCENTRATE ORAL
Status: ACTIVE | OUTPATIENT
Start: 2019-01-01 | End: 2019-01-01

## 2019-01-01 RX ORDER — MORPHINE SULFATE 2 MG/ML
2 INJECTION, SOLUTION INTRAMUSCULAR; INTRAVENOUS
Status: CANCELLED | OUTPATIENT
Start: 2019-01-01 | End: 2019-01-01

## 2019-01-01 RX ORDER — PROMETHAZINE HYDROCHLORIDE 12.5 MG/1
6.25 SUPPOSITORY RECTAL EVERY 4 HOURS PRN
Status: DISCONTINUED | OUTPATIENT
Start: 2019-01-01 | End: 2019-01-01

## 2019-01-01 RX ORDER — LATANOPROST 50 UG/ML
1 SOLUTION/ DROPS OPHTHALMIC NIGHTLY
Status: ON HOLD | COMMUNITY
End: 2019-01-01

## 2019-01-01 RX ORDER — HYDROMORPHONE HCL 110MG/55ML
1.5 PATIENT CONTROLLED ANALGESIA SYRINGE INTRAVENOUS
Status: ACTIVE | OUTPATIENT
Start: 2019-01-01 | End: 2019-01-01

## 2019-01-01 RX ORDER — HALOPERIDOL 5 MG/ML
2 INJECTION INTRAMUSCULAR EVERY 4 HOURS PRN
Status: CANCELLED | OUTPATIENT
Start: 2019-01-01

## 2019-01-01 RX ORDER — FUROSEMIDE 40 MG/1
40 TABLET ORAL DAILY
Status: ON HOLD | COMMUNITY
End: 2019-01-01

## 2019-01-01 RX ORDER — MEPERIDINE HYDROCHLORIDE 25 MG/ML
6.25 INJECTION INTRAMUSCULAR; INTRAVENOUS; SUBCUTANEOUS
Status: DISCONTINUED | OUTPATIENT
Start: 2019-01-01 | End: 2019-01-01 | Stop reason: HOSPADM

## 2019-01-01 RX ORDER — PROMETHAZINE HYDROCHLORIDE 25 MG/1
12.5 SUPPOSITORY RECTAL EVERY 4 HOURS PRN
Status: DISCONTINUED | OUTPATIENT
Start: 2019-01-01 | End: 2019-01-01

## 2019-01-01 RX ORDER — HYDROMORPHONE HYDROCHLORIDE 1 MG/ML
0.5 INJECTION, SOLUTION INTRAMUSCULAR; INTRAVENOUS; SUBCUTANEOUS
Status: CANCELLED | OUTPATIENT
Start: 2019-01-01 | End: 2019-01-01

## 2019-01-01 RX ORDER — ONDANSETRON 2 MG/ML
4 INJECTION INTRAMUSCULAR; INTRAVENOUS EVERY 6 HOURS PRN
Status: DISCONTINUED | OUTPATIENT
Start: 2019-01-01 | End: 2019-01-01 | Stop reason: HOSPADM

## 2019-01-01 RX ORDER — HYDROMORPHONE HYDROCHLORIDE 1 MG/ML
0.5 INJECTION, SOLUTION INTRAMUSCULAR; INTRAVENOUS; SUBCUTANEOUS
Status: DISCONTINUED | OUTPATIENT
Start: 2019-01-01 | End: 2019-01-01 | Stop reason: HOSPADM

## 2019-01-01 RX ORDER — PROMETHAZINE HYDROCHLORIDE 25 MG/1
6.25 TABLET ORAL EVERY 4 HOURS PRN
Status: DISCONTINUED | OUTPATIENT
Start: 2019-01-01 | End: 2019-01-01

## 2019-01-01 RX ORDER — SODIUM CHLORIDE 0.9 % (FLUSH) 0.9 %
1-10 SYRINGE (ML) INJECTION AS NEEDED
Status: DISCONTINUED | OUTPATIENT
Start: 2019-01-01 | End: 2019-01-01 | Stop reason: HOSPADM

## 2019-01-01 RX ORDER — MORPHINE SULFATE 20 MG/ML
5 SOLUTION ORAL
Status: ACTIVE | OUTPATIENT
Start: 2019-01-01 | End: 2019-01-01

## 2019-01-01 RX ORDER — ONDANSETRON 2 MG/ML
4 INJECTION INTRAMUSCULAR; INTRAVENOUS ONCE
Status: DISCONTINUED | OUTPATIENT
Start: 2019-01-01 | End: 2019-01-01

## 2019-01-01 RX ORDER — LORAZEPAM 2 MG/ML
2 CONCENTRATE ORAL
Status: CANCELLED | OUTPATIENT
Start: 2019-01-01 | End: 2019-01-01

## 2019-01-01 RX ORDER — LORAZEPAM 2 MG/ML
0.5 CONCENTRATE ORAL
Status: ACTIVE | OUTPATIENT
Start: 2019-01-01 | End: 2019-01-01

## 2019-01-01 RX ORDER — DIPHENOXYLATE HYDROCHLORIDE AND ATROPINE SULFATE 2.5; .025 MG/1; MG/1
1 TABLET ORAL
Status: CANCELLED | OUTPATIENT
Start: 2019-01-01

## 2019-01-01 RX ORDER — LATANOPROST 50 UG/ML
1 SOLUTION/ DROPS OPHTHALMIC NIGHTLY
Status: CANCELLED | OUTPATIENT
Start: 2019-01-01

## 2019-01-01 RX ORDER — ONDANSETRON 2 MG/ML
4 INJECTION INTRAMUSCULAR; INTRAVENOUS EVERY 6 HOURS PRN
Status: DISCONTINUED | OUTPATIENT
Start: 2019-01-01 | End: 2019-01-01

## 2019-01-01 RX ORDER — HALOPERIDOL 2 MG/ML
1 SOLUTION ORAL EVERY 4 HOURS PRN
Status: CANCELLED | OUTPATIENT
Start: 2019-01-01

## 2019-01-01 RX ORDER — BISACODYL 10 MG
10 SUPPOSITORY, RECTAL RECTAL DAILY PRN
Status: DISCONTINUED | OUTPATIENT
Start: 2019-01-01 | End: 2019-01-01 | Stop reason: HOSPADM

## 2019-01-01 RX ORDER — ALLOPURINOL 100 MG/1
100 TABLET ORAL DAILY
Status: DISCONTINUED | OUTPATIENT
Start: 2019-01-01 | End: 2019-01-01 | Stop reason: HOSPADM

## 2019-01-01 RX ORDER — ONDANSETRON 4 MG/1
4 TABLET, FILM COATED ORAL EVERY 6 HOURS PRN
Status: DISCONTINUED | OUTPATIENT
Start: 2019-01-01 | End: 2019-01-01

## 2019-01-01 RX ORDER — ASPIRIN 81 MG/1
81 TABLET ORAL DAILY
Status: ON HOLD | COMMUNITY
End: 2019-01-01

## 2019-01-01 RX ORDER — BISACODYL 5 MG/1
10 TABLET, DELAYED RELEASE ORAL DAILY PRN
Status: DISCONTINUED | OUTPATIENT
Start: 2019-01-01 | End: 2019-01-01

## 2019-01-01 RX ORDER — LORAZEPAM 2 MG/ML
2 INJECTION INTRAMUSCULAR
Status: ACTIVE | OUTPATIENT
Start: 2019-01-01 | End: 2019-01-01

## 2019-01-01 RX ORDER — LORAZEPAM 2 MG/ML
1 CONCENTRATE ORAL
Status: CANCELLED | OUTPATIENT
Start: 2019-01-01 | End: 2019-01-01

## 2019-01-01 RX ORDER — MORPHINE SULFATE 2 MG/ML
4 INJECTION, SOLUTION INTRAMUSCULAR; INTRAVENOUS ONCE
Status: COMPLETED | OUTPATIENT
Start: 2019-01-01 | End: 2019-01-01

## 2019-01-01 RX ORDER — ONDANSETRON 2 MG/ML
4 INJECTION INTRAMUSCULAR; INTRAVENOUS EVERY 6 HOURS PRN
Status: CANCELLED | OUTPATIENT
Start: 2019-01-01

## 2019-01-01 RX ORDER — MORPHINE SULFATE 4 MG/ML
4 INJECTION, SOLUTION INTRAMUSCULAR; INTRAVENOUS
Status: DISPENSED | OUTPATIENT
Start: 2019-01-01 | End: 2019-01-01

## 2019-01-01 RX ORDER — LORAZEPAM 2 MG/ML
1 CONCENTRATE ORAL
Status: ACTIVE | OUTPATIENT
Start: 2019-01-01 | End: 2019-01-01

## 2019-01-01 RX ORDER — GLYCOPYRROLATE 0.2 MG/ML
0.4 INJECTION INTRAMUSCULAR; INTRAVENOUS
Status: CANCELLED | OUTPATIENT
Start: 2019-01-01

## 2019-01-01 RX ORDER — LIDOCAINE HYDROCHLORIDE 20 MG/ML
INJECTION, SOLUTION INFILTRATION; PERINEURAL AS NEEDED
Status: DISCONTINUED | OUTPATIENT
Start: 2019-01-01 | End: 2019-01-01 | Stop reason: SURG

## 2019-01-01 RX ORDER — HALOPERIDOL 1 MG/1
1 TABLET ORAL EVERY 4 HOURS PRN
Status: CANCELLED | OUTPATIENT
Start: 2019-01-01

## 2019-01-01 RX ORDER — DIPHENHYDRAMINE HCL 25 MG
25 CAPSULE ORAL
Status: DISCONTINUED | OUTPATIENT
Start: 2019-01-01 | End: 2019-01-01 | Stop reason: HOSPADM

## 2019-01-01 RX ORDER — SODIUM CHLORIDE, SODIUM LACTATE, POTASSIUM CHLORIDE, CALCIUM CHLORIDE 600; 310; 30; 20 MG/100ML; MG/100ML; MG/100ML; MG/100ML
9 INJECTION, SOLUTION INTRAVENOUS CONTINUOUS
Status: DISCONTINUED | OUTPATIENT
Start: 2019-01-01 | End: 2019-01-01

## 2019-01-01 RX ORDER — MORPHINE SULFATE 2 MG/ML
4 INJECTION, SOLUTION INTRAMUSCULAR; INTRAVENOUS EVERY 4 HOURS PRN
Status: DISCONTINUED | OUTPATIENT
Start: 2019-01-01 | End: 2019-01-01

## 2019-01-01 RX ORDER — LORAZEPAM 2 MG/ML
1 INJECTION INTRAMUSCULAR
Status: CANCELLED | OUTPATIENT
Start: 2019-01-01 | End: 2019-01-01

## 2019-01-01 RX ORDER — GLYCOPYRROLATE 0.2 MG/ML
0.8 INJECTION INTRAMUSCULAR; INTRAVENOUS
Status: DISCONTINUED | OUTPATIENT
Start: 2019-01-01 | End: 2019-01-01 | Stop reason: HOSPADM

## 2019-01-01 RX ADMIN — ALLOPURINOL 300 MG: 300 TABLET ORAL at 02:10

## 2019-01-01 RX ADMIN — BISACODYL 10 MG: 5 TABLET ORAL at 17:44

## 2019-01-01 RX ADMIN — GLYCOPYRROLATE 0.4 MG: 0.2 INJECTION INTRAMUSCULAR; INTRAVENOUS at 08:10

## 2019-01-01 RX ADMIN — LORAZEPAM 0.5 MG: 2 INJECTION INTRAMUSCULAR; INTRAVENOUS at 01:06

## 2019-01-01 RX ADMIN — MORPHINE SULFATE 4 MG: 4 INJECTION INTRAVENOUS at 01:00

## 2019-01-01 RX ADMIN — ROCURONIUM BROMIDE 50 MG: 10 INJECTION INTRAVENOUS at 12:46

## 2019-01-01 RX ADMIN — MORPHINE SULFATE 4 MG: 4 INJECTION INTRAVENOUS at 05:45

## 2019-01-01 RX ADMIN — LORAZEPAM 0.5 MG: 2 INJECTION INTRAMUSCULAR; INTRAVENOUS at 12:40

## 2019-01-01 RX ADMIN — MORPHINE SULFATE 2 MG: 2 INJECTION, SOLUTION INTRAMUSCULAR; INTRAVENOUS at 17:47

## 2019-01-01 RX ADMIN — BISACODYL 10 MG: 10 SUPPOSITORY RECTAL at 09:03

## 2019-01-01 RX ADMIN — LORAZEPAM 1 MG: 2 INJECTION INTRAMUSCULAR; INTRAVENOUS at 08:11

## 2019-01-01 RX ADMIN — SODIUM CHLORIDE 100 ML/HR: 9 INJECTION, SOLUTION INTRAVENOUS at 20:36

## 2019-01-01 RX ADMIN — LORAZEPAM 0.5 MG: 2 INJECTION INTRAMUSCULAR; INTRAVENOUS at 20:27

## 2019-01-01 RX ADMIN — ONDANSETRON 4 MG: 2 INJECTION INTRAMUSCULAR; INTRAVENOUS at 14:44

## 2019-01-01 RX ADMIN — MORPHINE SULFATE 2 MG: 2 INJECTION, SOLUTION INTRAMUSCULAR; INTRAVENOUS at 00:02

## 2019-01-01 RX ADMIN — GLYCOPYRROLATE 0.4 MG: 0.2 INJECTION INTRAMUSCULAR; INTRAVENOUS at 20:42

## 2019-01-01 RX ADMIN — APIXABAN 2.5 MG: 2.5 TABLET, FILM COATED ORAL at 23:38

## 2019-01-01 RX ADMIN — LORAZEPAM 0.5 MG: 2 INJECTION INTRAMUSCULAR; INTRAVENOUS at 14:29

## 2019-01-01 RX ADMIN — MORPHINE SULFATE 6 MG: 10 INJECTION INTRAVENOUS at 20:57

## 2019-01-01 RX ADMIN — DEXTROSE AND SODIUM CHLORIDE 75 ML/HR: 5; 900 INJECTION, SOLUTION INTRAVENOUS at 00:58

## 2019-01-01 RX ADMIN — MORPHINE SULFATE 6 MG: 10 INJECTION INTRAVENOUS at 01:17

## 2019-01-01 RX ADMIN — MORPHINE SULFATE 2 MG: 2 INJECTION, SOLUTION INTRAMUSCULAR; INTRAVENOUS at 19:22

## 2019-01-01 RX ADMIN — LORAZEPAM 0.5 MG: 2 INJECTION INTRAMUSCULAR; INTRAVENOUS at 04:37

## 2019-01-01 RX ADMIN — MORPHINE SULFATE 6 MG: 10 INJECTION INTRAVENOUS at 14:12

## 2019-01-01 RX ADMIN — METOPROLOL TARTRATE 25 MG: 25 TABLET ORAL at 20:20

## 2019-01-01 RX ADMIN — BISACODYL 10 MG: 10 SUPPOSITORY RECTAL at 00:46

## 2019-01-01 RX ADMIN — LORAZEPAM 0.5 MG: 2 INJECTION INTRAMUSCULAR; INTRAVENOUS at 23:58

## 2019-01-01 RX ADMIN — HYDROMORPHONE HYDROCHLORIDE 1 MG: 1 INJECTION, SOLUTION INTRAMUSCULAR; INTRAVENOUS; SUBCUTANEOUS at 11:26

## 2019-01-01 RX ADMIN — MORPHINE SULFATE 2 MG: 2 INJECTION, SOLUTION INTRAMUSCULAR; INTRAVENOUS at 20:16

## 2019-01-01 RX ADMIN — METOPROLOL TARTRATE 25 MG: 25 TABLET ORAL at 21:32

## 2019-01-01 RX ADMIN — LORAZEPAM 0.5 MG: 2 INJECTION INTRAMUSCULAR; INTRAVENOUS at 13:18

## 2019-01-01 RX ADMIN — GLYCOPYRROLATE 0.4 MG: 0.2 INJECTION INTRAMUSCULAR; INTRAVENOUS at 12:55

## 2019-01-01 RX ADMIN — LORAZEPAM 0.5 MG: 2 INJECTION INTRAMUSCULAR; INTRAVENOUS at 03:25

## 2019-01-01 RX ADMIN — BISACODYL 10 MG: 10 SUPPOSITORY RECTAL at 16:39

## 2019-01-01 RX ADMIN — METOPROLOL TARTRATE 25 MG: 25 TABLET ORAL at 08:53

## 2019-01-01 RX ADMIN — LORAZEPAM 0.5 MG: 2 INJECTION INTRAMUSCULAR; INTRAVENOUS at 20:16

## 2019-01-01 RX ADMIN — HYDROMORPHONE HYDROCHLORIDE 1 MG: 1 INJECTION, SOLUTION INTRAMUSCULAR; INTRAVENOUS; SUBCUTANEOUS at 12:40

## 2019-01-01 RX ADMIN — METOPROLOL TARTRATE 25 MG: 25 TABLET ORAL at 20:21

## 2019-01-01 RX ADMIN — ASPIRIN 81 MG: 81 TABLET, COATED ORAL at 08:38

## 2019-01-01 RX ADMIN — HYDROMORPHONE HYDROCHLORIDE 1 MG: 1 INJECTION, SOLUTION INTRAMUSCULAR; INTRAVENOUS; SUBCUTANEOUS at 20:42

## 2019-01-01 RX ADMIN — MORPHINE SULFATE 4 MG: 4 INJECTION INTRAVENOUS at 19:45

## 2019-01-01 RX ADMIN — METOPROLOL TARTRATE 25 MG: 25 TABLET ORAL at 10:15

## 2019-01-01 RX ADMIN — METOPROLOL TARTRATE 25 MG: 25 TABLET ORAL at 09:34

## 2019-01-01 RX ADMIN — GLYCOPYRROLATE 0.4 MG: 0.2 INJECTION INTRAMUSCULAR; INTRAVENOUS at 00:34

## 2019-01-01 RX ADMIN — MORPHINE SULFATE 4 MG: 4 INJECTION INTRAVENOUS at 20:43

## 2019-01-01 RX ADMIN — APIXABAN 2.5 MG: 2.5 TABLET, FILM COATED ORAL at 21:08

## 2019-01-01 RX ADMIN — MORPHINE SULFATE 2 MG: 2 INJECTION, SOLUTION INTRAMUSCULAR; INTRAVENOUS at 20:57

## 2019-01-01 RX ADMIN — LORAZEPAM 0.5 MG: 2 INJECTION INTRAMUSCULAR; INTRAVENOUS at 11:21

## 2019-01-01 RX ADMIN — ACETAMINOPHEN 650 MG: 325 TABLET, FILM COATED ORAL at 04:29

## 2019-01-01 RX ADMIN — MORPHINE SULFATE 6 MG: 10 INJECTION INTRAVENOUS at 21:56

## 2019-01-01 RX ADMIN — LORAZEPAM 0.5 MG: 2 INJECTION INTRAMUSCULAR; INTRAVENOUS at 14:35

## 2019-01-01 RX ADMIN — LATANOPROST 1 DROP: 50 SOLUTION OPHTHALMIC at 21:17

## 2019-01-01 RX ADMIN — HYDROMORPHONE HYDROCHLORIDE 1 MG: 1 INJECTION, SOLUTION INTRAMUSCULAR; INTRAVENOUS; SUBCUTANEOUS at 12:55

## 2019-01-01 RX ADMIN — MORPHINE SULFATE 6 MG: 10 INJECTION INTRAVENOUS at 08:27

## 2019-01-01 RX ADMIN — MORPHINE SULFATE 4 MG: 2 INJECTION, SOLUTION INTRAMUSCULAR; INTRAVENOUS at 14:29

## 2019-01-01 RX ADMIN — FAMOTIDINE 20 MG: 10 INJECTION INTRAVENOUS at 12:27

## 2019-01-01 RX ADMIN — MORPHINE SULFATE 2 MG: 2 INJECTION, SOLUTION INTRAMUSCULAR; INTRAVENOUS at 09:09

## 2019-01-01 RX ADMIN — ASPIRIN 81 MG: 81 TABLET, COATED ORAL at 10:15

## 2019-01-01 RX ADMIN — LORAZEPAM 0.5 MG: 2 INJECTION INTRAMUSCULAR; INTRAVENOUS at 21:06

## 2019-01-01 RX ADMIN — SODIUM CHLORIDE 100 ML/HR: 9 INJECTION, SOLUTION INTRAVENOUS at 17:59

## 2019-01-01 RX ADMIN — FUROSEMIDE 40 MG: 40 TABLET ORAL at 09:26

## 2019-01-01 RX ADMIN — GLYCOPYRROLATE 0.4 MG: 0.2 INJECTION, SOLUTION INTRAMUSCULAR; INTRAVENOUS at 11:26

## 2019-01-01 RX ADMIN — GLYCOPYRROLATE 0.4 MG: 0.2 INJECTION INTRAMUSCULAR; INTRAVENOUS at 01:05

## 2019-01-01 RX ADMIN — MORPHINE SULFATE 6 MG: 10 INJECTION INTRAVENOUS at 12:55

## 2019-01-01 RX ADMIN — GLYCOPYRROLATE 0.4 MG: 0.2 INJECTION INTRAMUSCULAR; INTRAVENOUS at 04:47

## 2019-01-01 RX ADMIN — SODIUM CHLORIDE, PRESERVATIVE FREE 3 ML: 5 INJECTION INTRAVENOUS at 21:13

## 2019-01-01 RX ADMIN — SODIUM CHLORIDE, PRESERVATIVE FREE 3 ML: 5 INJECTION INTRAVENOUS at 20:22

## 2019-01-01 RX ADMIN — APIXABAN 2.5 MG: 2.5 TABLET, FILM COATED ORAL at 08:53

## 2019-01-01 RX ADMIN — ALLOPURINOL 300 MG: 300 TABLET ORAL at 08:53

## 2019-01-01 RX ADMIN — LORAZEPAM 0.5 MG: 2 INJECTION INTRAMUSCULAR; INTRAVENOUS at 08:36

## 2019-01-01 RX ADMIN — METOPROLOL TARTRATE 25 MG: 25 TABLET ORAL at 08:25

## 2019-01-01 RX ADMIN — SODIUM CHLORIDE, PRESERVATIVE FREE 3 ML: 5 INJECTION INTRAVENOUS at 21:33

## 2019-01-01 RX ADMIN — LORAZEPAM 1 MG: 2 INJECTION INTRAMUSCULAR; INTRAVENOUS at 04:42

## 2019-01-01 RX ADMIN — OXYCODONE HYDROCHLORIDE AND ACETAMINOPHEN 1 TABLET: 5; 325 TABLET ORAL at 12:49

## 2019-01-01 RX ADMIN — LORAZEPAM 0.5 MG: 2 INJECTION INTRAMUSCULAR; INTRAVENOUS at 16:21

## 2019-01-01 RX ADMIN — METOPROLOL TARTRATE 25 MG: 25 TABLET ORAL at 21:06

## 2019-01-01 RX ADMIN — IRON SUCROSE 300 MG: 20 INJECTION, SOLUTION INTRAVENOUS at 12:49

## 2019-01-01 RX ADMIN — BISACODYL 10 MG: 10 SUPPOSITORY RECTAL at 07:54

## 2019-01-01 RX ADMIN — SODIUM CHLORIDE 100 ML/HR: 9 INJECTION, SOLUTION INTRAVENOUS at 02:11

## 2019-01-01 RX ADMIN — MORPHINE SULFATE 2 MG: 2 INJECTION, SOLUTION INTRAMUSCULAR; INTRAVENOUS at 04:15

## 2019-01-01 RX ADMIN — MORPHINE SULFATE 2 MG: 2 INJECTION, SOLUTION INTRAMUSCULAR; INTRAVENOUS at 09:36

## 2019-01-01 RX ADMIN — OXYCODONE HYDROCHLORIDE AND ACETAMINOPHEN 1 TABLET: 5; 325 TABLET ORAL at 21:13

## 2019-01-01 RX ADMIN — GLYCOPYRROLATE 0.4 MG: 0.2 INJECTION INTRAMUSCULAR; INTRAVENOUS at 12:40

## 2019-01-01 RX ADMIN — SODIUM CHLORIDE, PRESERVATIVE FREE 3 ML: 5 INJECTION INTRAVENOUS at 08:54

## 2019-01-01 RX ADMIN — SUGAMMADEX 108 MG: 100 INJECTION, SOLUTION INTRAVENOUS at 13:47

## 2019-01-01 RX ADMIN — GLYCOPYRROLATE 0.4 MG: 0.2 INJECTION INTRAMUSCULAR; INTRAVENOUS at 10:02

## 2019-01-01 RX ADMIN — MORPHINE SULFATE 4 MG: 4 INJECTION INTRAVENOUS at 06:16

## 2019-01-01 RX ADMIN — MORPHINE SULFATE 2 MG: 2 INJECTION, SOLUTION INTRAMUSCULAR; INTRAVENOUS at 04:34

## 2019-01-01 RX ADMIN — HYDROMORPHONE HYDROCHLORIDE 1 MG: 1 INJECTION, SOLUTION INTRAMUSCULAR; INTRAVENOUS; SUBCUTANEOUS at 04:42

## 2019-01-01 RX ADMIN — MORPHINE SULFATE 2 MG: 2 INJECTION, SOLUTION INTRAMUSCULAR; INTRAVENOUS at 00:11

## 2019-01-01 RX ADMIN — FUROSEMIDE 40 MG: 40 TABLET ORAL at 21:06

## 2019-01-01 RX ADMIN — MORPHINE SULFATE 2 MG: 2 INJECTION, SOLUTION INTRAMUSCULAR; INTRAVENOUS at 23:58

## 2019-01-01 RX ADMIN — SODIUM CHLORIDE, PRESERVATIVE FREE 3 ML: 5 INJECTION INTRAVENOUS at 08:25

## 2019-01-01 RX ADMIN — METOPROLOL TARTRATE 25 MG: 25 TABLET ORAL at 23:04

## 2019-01-01 RX ADMIN — MORPHINE SULFATE 4 MG: 4 INJECTION INTRAVENOUS at 09:04

## 2019-01-01 RX ADMIN — GLYCOPYRROLATE 0.4 MG: 0.2 INJECTION, SOLUTION INTRAMUSCULAR; INTRAVENOUS at 14:06

## 2019-01-01 RX ADMIN — LATANOPROST 1 DROP: 50 SOLUTION OPHTHALMIC at 23:04

## 2019-01-01 RX ADMIN — LORAZEPAM 0.5 MG: 2 INJECTION INTRAMUSCULAR; INTRAVENOUS at 08:27

## 2019-01-01 RX ADMIN — OXYCODONE HYDROCHLORIDE AND ACETAMINOPHEN 1 TABLET: 5; 325 TABLET ORAL at 23:39

## 2019-01-01 RX ADMIN — MORPHINE SULFATE 2 MG: 2 INJECTION, SOLUTION INTRAMUSCULAR; INTRAVENOUS at 12:05

## 2019-01-01 RX ADMIN — ONDANSETRON 4 MG: 2 INJECTION INTRAMUSCULAR; INTRAVENOUS at 22:00

## 2019-01-01 RX ADMIN — SODIUM CHLORIDE 100 ML/HR: 9 INJECTION, SOLUTION INTRAVENOUS at 21:23

## 2019-01-01 RX ADMIN — METOPROLOL TARTRATE 25 MG: 25 TABLET ORAL at 09:07

## 2019-01-01 RX ADMIN — ONDANSETRON 4 MG: 2 INJECTION INTRAMUSCULAR; INTRAVENOUS at 12:49

## 2019-01-01 RX ADMIN — MORPHINE SULFATE 2 MG: 2 INJECTION, SOLUTION INTRAMUSCULAR; INTRAVENOUS at 09:01

## 2019-01-01 RX ADMIN — OXYCODONE HYDROCHLORIDE AND ACETAMINOPHEN 1 TABLET: 5; 325 TABLET ORAL at 01:38

## 2019-01-01 RX ADMIN — MORPHINE SULFATE 6 MG: 10 INJECTION INTRAVENOUS at 09:23

## 2019-01-01 RX ADMIN — HYDROMORPHONE HYDROCHLORIDE 1 MG: 1 INJECTION, SOLUTION INTRAMUSCULAR; INTRAVENOUS; SUBCUTANEOUS at 01:06

## 2019-01-01 RX ADMIN — LATANOPROST 1 DROP: 50 SOLUTION OPHTHALMIC at 20:15

## 2019-01-01 RX ADMIN — LORAZEPAM 0.5 MG: 2 INJECTION INTRAMUSCULAR; INTRAVENOUS at 10:55

## 2019-01-01 RX ADMIN — LORAZEPAM 0.5 MG: 2 INJECTION INTRAMUSCULAR; INTRAVENOUS at 10:02

## 2019-01-01 RX ADMIN — POTASSIUM CHLORIDE 40 MEQ: 750 CAPSULE, EXTENDED RELEASE ORAL at 09:07

## 2019-01-01 RX ADMIN — SODIUM CHLORIDE, PRESERVATIVE FREE 3 ML: 5 INJECTION INTRAVENOUS at 20:21

## 2019-01-01 RX ADMIN — METOPROLOL TARTRATE 25 MG: 25 TABLET ORAL at 08:38

## 2019-01-01 RX ADMIN — MORPHINE SULFATE 4 MG: 4 INJECTION INTRAVENOUS at 21:11

## 2019-01-01 RX ADMIN — MORPHINE SULFATE 4 MG: 4 INJECTION INTRAVENOUS at 10:37

## 2019-01-01 RX ADMIN — ONDANSETRON 4 MG: 2 INJECTION INTRAMUSCULAR; INTRAVENOUS at 23:39

## 2019-01-01 RX ADMIN — SODIUM CHLORIDE, PRESERVATIVE FREE 3 ML: 5 INJECTION INTRAVENOUS at 20:42

## 2019-01-01 RX ADMIN — ONDANSETRON 4 MG: 2 INJECTION INTRAMUSCULAR; INTRAVENOUS at 05:28

## 2019-01-01 RX ADMIN — ALLOPURINOL 100 MG: 100 TABLET ORAL at 20:43

## 2019-01-01 RX ADMIN — MORPHINE SULFATE 4 MG: 4 INJECTION INTRAVENOUS at 12:21

## 2019-01-01 RX ADMIN — SCOPALAMINE 1 PATCH: 1 PATCH, EXTENDED RELEASE TRANSDERMAL at 10:04

## 2019-01-01 RX ADMIN — LATANOPROST 1 DROP: 50 SOLUTION OPHTHALMIC at 21:18

## 2019-01-01 RX ADMIN — APIXABAN 2.5 MG: 2.5 TABLET, FILM COATED ORAL at 08:25

## 2019-01-01 RX ADMIN — APIXABAN 2.5 MG: 2.5 TABLET, FILM COATED ORAL at 20:41

## 2019-01-01 RX ADMIN — LATANOPROST 1 DROP: 50 SOLUTION OPHTHALMIC at 21:59

## 2019-01-01 RX ADMIN — MORPHINE SULFATE 4 MG: 2 INJECTION, SOLUTION INTRAMUSCULAR; INTRAVENOUS at 22:00

## 2019-01-01 RX ADMIN — MORPHINE SULFATE 2 MG: 2 INJECTION, SOLUTION INTRAMUSCULAR; INTRAVENOUS at 00:09

## 2019-01-01 RX ADMIN — SODIUM CHLORIDE, PRESERVATIVE FREE 3 ML: 5 INJECTION INTRAVENOUS at 21:07

## 2019-01-01 RX ADMIN — BISACODYL 10 MG: 10 SUPPOSITORY RECTAL at 14:21

## 2019-01-01 RX ADMIN — GLYCOPYRROLATE 0.4 MG: 0.2 INJECTION INTRAMUSCULAR; INTRAVENOUS at 04:42

## 2019-01-01 RX ADMIN — DEXTROSE AND SODIUM CHLORIDE 75 ML/HR: 5; 900 INJECTION, SOLUTION INTRAVENOUS at 11:27

## 2019-01-01 RX ADMIN — FENTANYL CITRATE 25 MCG: 50 INJECTION, SOLUTION INTRAMUSCULAR; INTRAVENOUS at 14:52

## 2019-01-01 RX ADMIN — LORAZEPAM 1 MG: 2 INJECTION INTRAMUSCULAR; INTRAVENOUS at 12:55

## 2019-01-01 RX ADMIN — LORAZEPAM 1 MG: 2 INJECTION INTRAMUSCULAR; INTRAVENOUS at 16:07

## 2019-01-01 RX ADMIN — HYDROMORPHONE HYDROCHLORIDE 1 MG: 1 INJECTION, SOLUTION INTRAMUSCULAR; INTRAVENOUS; SUBCUTANEOUS at 20:26

## 2019-01-01 RX ADMIN — MORPHINE SULFATE 6 MG: 10 INJECTION INTRAVENOUS at 00:46

## 2019-01-01 RX ADMIN — HYDROMORPHONE HYDROCHLORIDE 1 MG: 1 INJECTION, SOLUTION INTRAMUSCULAR; INTRAVENOUS; SUBCUTANEOUS at 08:10

## 2019-01-01 RX ADMIN — LORAZEPAM 0.5 MG: 2 INJECTION INTRAMUSCULAR; INTRAVENOUS at 00:46

## 2019-01-01 RX ADMIN — MORPHINE SULFATE 2 MG: 2 INJECTION, SOLUTION INTRAMUSCULAR; INTRAVENOUS at 20:32

## 2019-01-01 RX ADMIN — LIDOCAINE HYDROCHLORIDE 50 MG: 20 INJECTION, SOLUTION INFILTRATION; PERINEURAL at 12:46

## 2019-01-01 RX ADMIN — ASPIRIN 81 MG: 81 TABLET, COATED ORAL at 09:26

## 2019-01-01 RX ADMIN — MORPHINE SULFATE 2 MG: 2 INJECTION, SOLUTION INTRAMUSCULAR; INTRAVENOUS at 21:18

## 2019-01-01 RX ADMIN — PROPOFOL 60 MG: 10 INJECTION, EMULSION INTRAVENOUS at 12:46

## 2019-01-01 RX ADMIN — ONDANSETRON HYDROCHLORIDE 4 MG: 2 SOLUTION INTRAMUSCULAR; INTRAVENOUS at 16:35

## 2019-01-01 RX ADMIN — MORPHINE SULFATE 2 MG: 2 INJECTION, SOLUTION INTRAMUSCULAR; INTRAVENOUS at 16:42

## 2019-01-01 RX ADMIN — LATANOPROST 1 DROP: 50 SOLUTION OPHTHALMIC at 20:22

## 2019-01-01 RX ADMIN — ONDANSETRON 4 MG: 2 INJECTION INTRAMUSCULAR; INTRAVENOUS at 13:00

## 2019-01-01 RX ADMIN — FENTANYL CITRATE 25 MCG: 50 INJECTION INTRAMUSCULAR; INTRAVENOUS at 13:10

## 2019-01-01 RX ADMIN — MORPHINE SULFATE 4 MG: 4 INJECTION INTRAVENOUS at 06:02

## 2019-01-01 RX ADMIN — MORPHINE SULFATE 6 MG: 10 INJECTION INTRAVENOUS at 12:03

## 2019-01-01 RX ADMIN — SODIUM CHLORIDE 100 ML/HR: 9 INJECTION, SOLUTION INTRAVENOUS at 07:34

## 2019-01-01 RX ADMIN — FUROSEMIDE 40 MG: 40 TABLET ORAL at 09:07

## 2019-01-01 RX ADMIN — HYDROMORPHONE HYDROCHLORIDE 1 MG: 1 INJECTION, SOLUTION INTRAMUSCULAR; INTRAVENOUS; SUBCUTANEOUS at 21:06

## 2019-01-01 RX ADMIN — SODIUM CHLORIDE 100 ML/HR: 9 INJECTION, SOLUTION INTRAVENOUS at 08:23

## 2019-01-01 RX ADMIN — MORPHINE SULFATE 2 MG: 2 INJECTION, SOLUTION INTRAMUSCULAR; INTRAVENOUS at 11:21

## 2019-01-01 RX ADMIN — ONDANSETRON 4 MG: 2 INJECTION INTRAMUSCULAR; INTRAVENOUS at 04:10

## 2019-01-01 RX ADMIN — MORPHINE SULFATE 2 MG: 2 INJECTION, SOLUTION INTRAMUSCULAR; INTRAVENOUS at 14:29

## 2019-01-01 RX ADMIN — LORAZEPAM 0.5 MG: 2 INJECTION INTRAMUSCULAR; INTRAVENOUS at 22:22

## 2019-01-01 RX ADMIN — ALLOPURINOL 300 MG: 300 TABLET ORAL at 01:25

## 2019-01-01 RX ADMIN — SODIUM CHLORIDE, PRESERVATIVE FREE 3 ML: 5 INJECTION INTRAVENOUS at 08:38

## 2019-01-01 RX ADMIN — ONDANSETRON 4 MG: 2 INJECTION INTRAMUSCULAR; INTRAVENOUS at 14:29

## 2019-01-01 RX ADMIN — OXYCODONE HYDROCHLORIDE AND ACETAMINOPHEN 1 TABLET: 5; 325 TABLET ORAL at 08:23

## 2019-01-01 RX ADMIN — METOPROLOL TARTRATE 25 MG: 25 TABLET ORAL at 20:41

## 2019-01-01 RX ADMIN — LATANOPROST 1 DROP: 50 SOLUTION OPHTHALMIC at 22:27

## 2019-01-01 RX ADMIN — ONDANSETRON 4 MG: 2 INJECTION INTRAMUSCULAR; INTRAVENOUS at 08:25

## 2019-01-01 RX ADMIN — OXYCODONE HYDROCHLORIDE AND ACETAMINOPHEN 1 TABLET: 5; 325 TABLET ORAL at 20:41

## 2019-01-01 RX ADMIN — HYDROMORPHONE HYDROCHLORIDE 1 MG: 1 INJECTION, SOLUTION INTRAMUSCULAR; INTRAVENOUS; SUBCUTANEOUS at 10:02

## 2019-01-01 RX ADMIN — HYDROMORPHONE HYDROCHLORIDE 1 MG: 1 INJECTION, SOLUTION INTRAMUSCULAR; INTRAVENOUS; SUBCUTANEOUS at 00:35

## 2019-01-01 RX ADMIN — MORPHINE SULFATE 4 MG: 4 INJECTION INTRAVENOUS at 17:33

## 2019-01-01 RX ADMIN — HYDROMORPHONE HYDROCHLORIDE 1 MG: 1 INJECTION, SOLUTION INTRAMUSCULAR; INTRAVENOUS; SUBCUTANEOUS at 16:08

## 2019-01-01 RX ADMIN — LATANOPROST 1 DROP: 50 SOLUTION OPHTHALMIC at 21:11

## 2019-01-01 RX ADMIN — SODIUM CHLORIDE, PRESERVATIVE FREE 3 ML: 5 INJECTION INTRAVENOUS at 09:26

## 2019-01-01 RX ADMIN — MORPHINE SULFATE 4 MG: 2 INJECTION, SOLUTION INTRAMUSCULAR; INTRAVENOUS at 14:24

## 2019-01-01 RX ADMIN — OXYCODONE HYDROCHLORIDE AND ACETAMINOPHEN 1 TABLET: 5; 325 TABLET ORAL at 14:44

## 2019-01-01 RX ADMIN — LORAZEPAM 0.5 MG: 2 INJECTION INTRAMUSCULAR; INTRAVENOUS at 00:34

## 2019-01-01 RX ADMIN — MORPHINE SULFATE 2 MG: 2 INJECTION, SOLUTION INTRAMUSCULAR; INTRAVENOUS at 20:41

## 2019-01-01 RX ADMIN — GLYCOPYRROLATE 0.4 MG: 0.2 INJECTION INTRAMUSCULAR; INTRAVENOUS at 00:28

## 2019-01-01 RX ADMIN — MORPHINE SULFATE 4 MG: 4 INJECTION INTRAVENOUS at 13:18

## 2019-01-01 RX ADMIN — ONDANSETRON 4 MG: 2 INJECTION INTRAMUSCULAR; INTRAVENOUS at 09:19

## 2019-01-01 RX ADMIN — OXYCODONE HYDROCHLORIDE AND ACETAMINOPHEN 1 TABLET: 5; 325 TABLET ORAL at 09:19

## 2019-01-01 RX ADMIN — ALLOPURINOL 100 MG: 100 TABLET ORAL at 08:27

## 2019-01-01 RX ADMIN — APIXABAN 2.5 MG: 2.5 TABLET, FILM COATED ORAL at 09:07

## 2019-01-01 RX ADMIN — HYDROMORPHONE HYDROCHLORIDE 1 MG: 1 INJECTION, SOLUTION INTRAMUSCULAR; INTRAVENOUS; SUBCUTANEOUS at 17:31

## 2019-01-01 RX ADMIN — LATANOPROST 1 DROP: 50 SOLUTION OPHTHALMIC at 21:32

## 2019-01-01 RX ADMIN — DEXTROSE AND SODIUM CHLORIDE 75 ML/HR: 5; 900 INJECTION, SOLUTION INTRAVENOUS at 14:31

## 2019-01-01 RX ADMIN — MORPHINE SULFATE 6 MG: 10 INJECTION INTRAVENOUS at 05:02

## 2019-01-01 RX ADMIN — ASPIRIN 81 MG: 81 TABLET, COATED ORAL at 08:53

## 2019-01-01 RX ADMIN — MORPHINE SULFATE 4 MG: 4 INJECTION INTRAVENOUS at 13:34

## 2019-01-01 RX ADMIN — ALLOPURINOL 300 MG: 300 TABLET ORAL at 09:26

## 2019-01-01 RX ADMIN — SODIUM CHLORIDE, POTASSIUM CHLORIDE, SODIUM LACTATE AND CALCIUM CHLORIDE 9 ML/HR: 600; 310; 30; 20 INJECTION, SOLUTION INTRAVENOUS at 12:27

## 2019-01-01 RX ADMIN — FUROSEMIDE 40 MG: 40 TABLET ORAL at 08:53

## 2019-01-01 RX ADMIN — MORPHINE SULFATE 6 MG: 10 INJECTION INTRAVENOUS at 16:18

## 2019-01-01 RX ADMIN — OXYCODONE HYDROCHLORIDE AND ACETAMINOPHEN 1 TABLET: 5; 325 TABLET ORAL at 05:28

## 2019-01-01 RX ADMIN — MORPHINE SULFATE 4 MG: 4 INJECTION INTRAVENOUS at 10:55

## 2019-01-01 RX ADMIN — MORPHINE SULFATE 4 MG: 2 INJECTION, SOLUTION INTRAMUSCULAR; INTRAVENOUS at 01:25

## 2019-01-01 RX ADMIN — ONDANSETRON 4 MG: 2 INJECTION INTRAMUSCULAR; INTRAVENOUS at 14:24

## 2019-01-01 RX ADMIN — MORPHINE SULFATE 4 MG: 4 INJECTION INTRAVENOUS at 08:09

## 2019-01-01 RX ADMIN — SODIUM CHLORIDE, PRESERVATIVE FREE 3 ML: 5 INJECTION INTRAVENOUS at 08:24

## 2019-01-01 RX ADMIN — APIXABAN 2.5 MG: 2.5 TABLET, FILM COATED ORAL at 16:04

## 2019-01-01 RX ADMIN — LATANOPROST 1 DROP: 50 SOLUTION OPHTHALMIC at 21:02

## 2019-01-01 RX ADMIN — OXYCODONE HYDROCHLORIDE AND ACETAMINOPHEN 1 TABLET: 5; 325 TABLET ORAL at 16:41

## 2019-01-01 RX ADMIN — ACETAMINOPHEN 650 MG: 325 TABLET, FILM COATED ORAL at 19:36

## 2019-01-01 RX ADMIN — MORPHINE SULFATE 4 MG: 4 INJECTION INTRAVENOUS at 00:07

## 2019-01-01 RX ADMIN — LORAZEPAM 1 MG: 2 INJECTION INTRAMUSCULAR; INTRAVENOUS at 00:28

## 2019-01-01 RX ADMIN — METOPROLOL TARTRATE 25 MG: 25 TABLET ORAL at 21:08

## 2019-01-01 RX ADMIN — MORPHINE SULFATE 2 MG: 2 INJECTION, SOLUTION INTRAMUSCULAR; INTRAVENOUS at 13:45

## 2019-01-01 RX ADMIN — MORPHINE SULFATE 6 MG: 10 INJECTION INTRAVENOUS at 05:06

## 2019-01-01 RX ADMIN — MORPHINE SULFATE 4 MG: 4 INJECTION INTRAVENOUS at 08:52

## 2019-01-01 RX ADMIN — LORAZEPAM 1 MG: 2 INJECTION INTRAMUSCULAR; INTRAVENOUS at 20:42

## 2019-01-01 RX ADMIN — LATANOPROST 1 DROP: 50 SOLUTION OPHTHALMIC at 21:30

## 2019-01-01 RX ADMIN — MORPHINE SULFATE 2 MG: 2 INJECTION, SOLUTION INTRAMUSCULAR; INTRAVENOUS at 22:19

## 2019-01-01 RX ADMIN — LORAZEPAM 0.5 MG: 2 INJECTION INTRAMUSCULAR; INTRAVENOUS at 04:47

## 2019-01-01 RX ADMIN — LATANOPROST 1 DROP: 50 SOLUTION OPHTHALMIC at 21:07

## 2019-01-01 RX ADMIN — ONDANSETRON 4 MG: 2 INJECTION INTRAMUSCULAR; INTRAVENOUS at 16:50

## 2019-01-01 RX ADMIN — ONDANSETRON HYDROCHLORIDE 4 MG: 2 SOLUTION INTRAMUSCULAR; INTRAVENOUS at 18:40

## 2019-01-01 RX ADMIN — IRON SUCROSE 300 MG: 20 INJECTION, SOLUTION INTRAVENOUS at 17:44

## 2019-01-01 RX ADMIN — ONDANSETRON 4 MG: 2 INJECTION INTRAMUSCULAR; INTRAVENOUS at 20:41

## 2019-01-01 RX ADMIN — DEXAMETHASONE SODIUM PHOSPHATE 4 MG: 10 INJECTION INTRAMUSCULAR; INTRAVENOUS at 13:00

## 2019-01-01 RX ADMIN — LATANOPROST 1 DROP: 50 SOLUTION OPHTHALMIC at 20:20

## 2019-01-01 RX ADMIN — MORPHINE SULFATE 2 MG: 2 INJECTION, SOLUTION INTRAMUSCULAR; INTRAVENOUS at 17:43

## 2019-01-01 RX ADMIN — MORPHINE SULFATE 6 MG: 10 INJECTION INTRAVENOUS at 13:46

## 2019-01-01 RX ADMIN — ACETAMINOPHEN 650 MG: 325 TABLET, FILM COATED ORAL at 12:20

## 2019-01-01 RX ADMIN — LORAZEPAM 0.5 MG: 2 INJECTION INTRAMUSCULAR; INTRAVENOUS at 04:34

## 2019-01-01 RX ADMIN — GLYCOPYRROLATE 0.4 MG: 0.2 INJECTION INTRAMUSCULAR; INTRAVENOUS at 08:37

## 2019-01-01 RX ADMIN — LATANOPROST 1 DROP: 50 SOLUTION OPHTHALMIC at 22:07

## 2019-01-01 RX ADMIN — MORPHINE SULFATE 4 MG: 4 INJECTION INTRAVENOUS at 17:04

## 2019-01-01 RX ADMIN — MORPHINE SULFATE 4 MG: 4 INJECTION INTRAVENOUS at 02:51

## 2019-01-01 RX ADMIN — HYDROMORPHONE HYDROCHLORIDE 1 MG: 1 INJECTION, SOLUTION INTRAMUSCULAR; INTRAVENOUS; SUBCUTANEOUS at 04:36

## 2019-01-01 RX ADMIN — GLYCOPYRROLATE 0.4 MG: 0.2 INJECTION INTRAMUSCULAR; INTRAVENOUS at 21:06

## 2019-01-01 RX ADMIN — ASPIRIN 81 MG: 81 TABLET, COATED ORAL at 09:07

## 2019-01-01 RX ADMIN — MORPHINE SULFATE 4 MG: 4 INJECTION INTRAVENOUS at 10:02

## 2019-01-01 RX ADMIN — GLYCOPYRROLATE 0.4 MG: 0.2 INJECTION INTRAMUSCULAR; INTRAVENOUS at 16:21

## 2019-01-01 RX ADMIN — MORPHINE SULFATE 2 MG: 2 INJECTION, SOLUTION INTRAMUSCULAR; INTRAVENOUS at 15:08

## 2019-01-01 RX ADMIN — HYDROMORPHONE HYDROCHLORIDE 1 MG: 1 INJECTION, SOLUTION INTRAMUSCULAR; INTRAVENOUS; SUBCUTANEOUS at 16:14

## 2019-01-01 RX ADMIN — HYDROMORPHONE HYDROCHLORIDE 0.25 MG: 1 INJECTION, SOLUTION INTRAMUSCULAR; INTRAVENOUS; SUBCUTANEOUS at 14:42

## 2019-01-01 RX ADMIN — LORAZEPAM 0.5 MG: 2 INJECTION INTRAMUSCULAR; INTRAVENOUS at 14:11

## 2019-01-01 RX ADMIN — FUROSEMIDE 40 MG: 40 TABLET ORAL at 08:25

## 2019-01-01 RX ADMIN — HYDROMORPHONE HYDROCHLORIDE 0.25 MG: 1 INJECTION, SOLUTION INTRAMUSCULAR; INTRAVENOUS; SUBCUTANEOUS at 14:47

## 2019-01-01 RX ADMIN — LORAZEPAM 0.5 MG: 2 INJECTION INTRAMUSCULAR; INTRAVENOUS at 17:31

## 2019-01-01 RX ADMIN — HYDROMORPHONE HYDROCHLORIDE 1 MG: 1 INJECTION, SOLUTION INTRAMUSCULAR; INTRAVENOUS; SUBCUTANEOUS at 00:28

## 2019-01-01 RX ADMIN — LATANOPROST 1 DROP: 50 SOLUTION OPHTHALMIC at 20:48

## 2019-01-01 RX ADMIN — MORPHINE SULFATE 4 MG: 2 INJECTION, SOLUTION INTRAMUSCULAR; INTRAVENOUS at 16:35

## 2019-01-01 RX ADMIN — SODIUM CHLORIDE, PRESERVATIVE FREE 3 ML: 5 INJECTION INTRAVENOUS at 09:34

## 2019-01-01 RX ADMIN — ALLOPURINOL 300 MG: 300 TABLET ORAL at 08:25

## 2019-01-01 RX ADMIN — MORPHINE SULFATE 6 MG: 10 INJECTION INTRAVENOUS at 17:48

## 2019-01-01 RX ADMIN — HYDROMORPHONE HYDROCHLORIDE 1 MG: 1 INJECTION, SOLUTION INTRAMUSCULAR; INTRAVENOUS; SUBCUTANEOUS at 08:37

## 2019-01-01 RX ADMIN — MORPHINE SULFATE 4 MG: 4 INJECTION INTRAVENOUS at 08:42

## 2019-01-01 RX ADMIN — ASPIRIN 81 MG: 81 TABLET, COATED ORAL at 08:25

## 2019-01-01 RX ADMIN — HYDROMORPHONE HYDROCHLORIDE 1 MG: 1 INJECTION, SOLUTION INTRAMUSCULAR; INTRAVENOUS; SUBCUTANEOUS at 04:47

## 2019-01-01 RX ADMIN — GLYCOPYRROLATE 0.4 MG: 0.2 INJECTION INTRAMUSCULAR; INTRAVENOUS at 04:37

## 2019-01-01 RX ADMIN — ALLOPURINOL 100 MG: 100 TABLET ORAL at 08:55

## 2019-01-01 RX ADMIN — ALLOPURINOL 100 MG: 100 TABLET ORAL at 09:16

## 2019-01-01 RX ADMIN — SODIUM CHLORIDE, PRESERVATIVE FREE 3 ML: 5 INJECTION INTRAVENOUS at 09:07

## 2019-01-01 RX ADMIN — MORPHINE SULFATE 4 MG: 4 INJECTION INTRAVENOUS at 23:31

## 2019-01-01 RX ADMIN — HYDROMORPHONE HYDROCHLORIDE 1 MG: 1 INJECTION, SOLUTION INTRAMUSCULAR; INTRAVENOUS; SUBCUTANEOUS at 14:06

## 2019-01-01 RX ADMIN — LORAZEPAM 1 MG: 2 INJECTION INTRAMUSCULAR; INTRAVENOUS at 11:26

## 2019-01-01 RX ADMIN — ASPIRIN 81 MG: 81 TABLET, COATED ORAL at 09:34

## 2019-01-01 RX ADMIN — LORAZEPAM 0.5 MG: 2 INJECTION INTRAMUSCULAR; INTRAVENOUS at 12:21

## 2019-01-01 RX ADMIN — ALLOPURINOL 300 MG: 300 TABLET ORAL at 10:15

## 2019-01-01 RX ADMIN — METOPROLOL TARTRATE 25 MG: 25 TABLET ORAL at 09:26

## 2019-01-01 RX ADMIN — OXYCODONE HYDROCHLORIDE AND ACETAMINOPHEN 1 TABLET: 5; 325 TABLET ORAL at 13:27

## 2019-07-24 PROBLEM — N17.9 AKI (ACUTE KIDNEY INJURY) (HCC): Status: ACTIVE | Noted: 2019-01-01

## 2019-07-24 PROBLEM — S72.141A CLOSED INTERTROCHANTERIC FRACTURE OF RIGHT HIP, INITIAL ENCOUNTER (HCC): Status: ACTIVE | Noted: 2019-01-01

## 2019-07-24 PROBLEM — I50.9 CHF (CONGESTIVE HEART FAILURE) (HCC): Status: ACTIVE | Noted: 2019-01-01

## 2019-07-24 PROBLEM — E78.5 HLD (HYPERLIPIDEMIA): Status: ACTIVE | Noted: 2019-01-01

## 2019-07-24 PROBLEM — I10 HTN (HYPERTENSION): Status: ACTIVE | Noted: 2019-01-01

## 2019-07-24 PROBLEM — I35.0 AORTIC STENOSIS: Status: ACTIVE | Noted: 2019-01-01

## 2019-07-26 NOTE — ANESTHESIA PREPROCEDURE EVALUATION
Anesthesia Evaluation     Patient summary reviewed and Nursing notes reviewed   NPO Solid Status: > 8 hours  NPO Liquid Status: > 2 hours           Airway   Mallampati: II  TM distance: >3 FB  Neck ROM: full  no difficulty expected  Dental - normal exam     Pulmonary - normal exam   Cardiovascular - normal exam    (+) hypertension, valvular problems/murmurs AS, CHF, hyperlipidemia,       Neuro/Psych  GI/Hepatic/Renal/Endo      Musculoskeletal     Abdominal  - normal exam   Substance History      OB/GYN          Other                        Anesthesia Plan    ASA 3     general     Anesthetic plan, all risks, benefits, and alternatives have been provided, discussed and informed consent has been obtained with: patient.

## 2019-07-26 NOTE — ANESTHESIA PROCEDURE NOTES
Airway  Urgency: elective    Airway not difficult    General Information and Staff    Patient location during procedure: OR  Anesthesiologist: Jacob Vera MD  CRNA: Arlet Joe CRNA    Indications and Patient Condition  Indications for airway management: airway protection    Preoxygenated: yes  MILS maintained throughout  Mask difficulty assessment: 2 - vent by mask + OA or adjuvant +/- NMBA    Final Airway Details  Final airway type: endotracheal airway      Successful airway: ETT  Cuffed: yes   Successful intubation technique: direct laryngoscopy  Facilitating devices/methods: intubating stylet  Endotracheal tube insertion site: oral  Blade: Marvin  Blade size: 2  ETT size (mm): 7.0  Cormack-Lehane Classification: grade IIa - partial view of glottis  Placement verified by: chest auscultation and capnometry   Cuff volume (mL): 8  Measured from: lips  ETT to lips (cm): 21  Number of attempts at approach: 1    Additional Comments  Atraumatic ET Tube placement.  Teeth as pre-op. BLEBS.  -ABD sounds.  +ET CO2.  Secured to face

## 2019-07-26 NOTE — ANESTHESIA POSTPROCEDURE EVALUATION
"Patient: Isela Ewing    Procedure Summary     Date:  07/26/19 Room / Location:  [facility] OR [address] [place] [address] MAIN OR    Anesthesia Start:  1240 Anesthesia Stop:  1411    Procedure:  RIGHT HIP INTRAMEDULLARY NAILING (Right Thigh) Diagnosis:      Surgeon:  Jerome Forbes MD Provider:  Jacob Vera MD    Anesthesia Type:  general ASA Status:  3          Anesthesia Type: general  Last vitals  BP   150/56 (07/26/19 1445)   Temp   36.4 °C (97.5 °F) (07/26/19 1405)   Pulse   77 (07/26/19 1445)   Resp   16 (07/26/19 1445)     SpO2   94 % (07/26/19 1445)     Post Anesthesia Care and Evaluation    Patient location during evaluation: bedside  Patient participation: complete - patient participated  Level of consciousness: awake  Pain score: 2  Pain management: adequate  Airway patency: patent  Anesthetic complications: No anesthetic complications    Cardiovascular status: acceptable  Respiratory status: acceptable  Hydration status: acceptable    Comments: /56 (BP Location: Left arm, Patient Position: Lying)   Pulse 77   Temp 36.4 °C (97.5 °F) (Oral)   Resp 16   Ht 152.4 cm (60\")   Wt 54 kg (119 lb)   SpO2 94%   BMI 23.24 kg/m²         "

## 2019-08-01 PROBLEM — Z51.5 PALLIATIVE CARE BY SPECIALIST: Status: ACTIVE | Noted: 2019-01-01

## 2019-08-01 PROBLEM — R13.12 OROPHARYNGEAL DYSPHAGIA: Status: ACTIVE | Noted: 2019-01-01

## 2019-08-01 PROBLEM — I27.20 PULMONARY HYPERTENSION (HCC): Status: ACTIVE | Noted: 2019-01-01

## 2019-08-01 PROBLEM — D62 POSTOPERATIVE ANEMIA DUE TO ACUTE BLOOD LOSS: Status: ACTIVE | Noted: 2019-01-01

## 2019-08-01 PROBLEM — I34.0 NON-RHEUMATIC MITRAL REGURGITATION: Status: ACTIVE | Noted: 2019-01-01

## 2019-08-01 PROBLEM — S72.001D CLOSED FRACTURE OF RIGHT HIP REQUIRING OPERATIVE REPAIR WITH ROUTINE HEALING: Status: ACTIVE | Noted: 2019-01-01

## 2019-08-01 PROBLEM — I51.9 LEFT VENTRICULAR DIASTOLIC DYSFUNCTION: Status: ACTIVE | Noted: 2019-01-01

## 2019-08-01 NOTE — CONSULTS
" initiative. Initial encounter attempt. Pt awake, caregiver assisting with feeding. Grand daughter entered later. Pt has very poor vision and hearing. Active ember of of [facility], tessy has been informed of admission. Grand daughter inquired about Eucharist, pt not receiving. Updated Yazidi to Religious to facilitate [facility] eucharistic ministers. No spiritual distress noted in room. Pt asked for prayer. Prayed with pt.     Answers below reflect the pt's responses. (The [employer] Spiritual History Tool ©™ serves as a guide for conversations in the clinical setting and can help structure questions in taking a spiritual history by healthcare professionals.)    F - Noemi and Belief  \"Do you consider yourself spiritual or Anabaptist?\" Yes, I have been a member of [facility] for many years.  \"Is spirituality something important to you?”   “Do you have spiritual beliefs that help you cope with stress/ difficult times?\"      If the patient responds \"No,\" the health care provider might ask, \"What gives your life meaning?\" Sometimes patients respond with answers such as family, career, or nature.  (The question of meaning should also be asked even if people answer yes to spirituality)     I - Importance  \"What importance does your spirituality have in our life?\" [employer] is very important to  Me.  \"Has your spirituality influenced how you take care of yourself, your health?\"   \"Does your spirituality influence you in your healthcare decision making? (e.g. advance directives, treatment, etc.)\"      C - Community - \"Are you part of a spiritual community? Yes (see F)    Communities such as churches, temples, and mosques, or a group of like-minded friends, family, or yoga can serve as strong support systems for some patients.  Can explore further: Is this of support to you and how? Is there a group of people you really love or who are important to you?\"    A - Address in Care - \"How would you like " "me, your healthcare provider, to address these issues in your healthcare?\"  You can pray for me.    (With the newer models including diagnosis of spiritual distress A also refers to the \"Assessment and Plan\" of patient spiritual distress or issues within a treatment or care plan.)    © Copyright, Lana Castro MD, 1996  Citation: MEI Castro, & BEREKET Clayton (2000). Taking a spiritual history allows clinicians to understand patients more fully. Journal of Palliative Medicine, 3(1) 129-137.    As with any other part of the patient interview, the spiritual histories should be patient-centered. Thus, the tool is meant to create an environment of trust by indicating to the patient that the physician or other healthcare professional is open to listening to the patient about his or her spiritual issues, if the patient wants to talk about those issues. There are ethical guidelines to which the physician or healthcare provider should adhere when taking a spiritual history. Healthcare professionals are encouraged not to use the [employer] tool as a checklist, but rather to rely on it as a guide to aid and open the discussion to spiritual issues. See more recommendations for taking a spiritual history.  "

## 2019-08-01 NOTE — PLAN OF CARE
Problem: Patient Care Overview  Goal: Plan of Care Review  Outcome: Ongoing (interventions implemented as appropriate)   08/01/19 0533   Coping/Psychosocial   Plan of Care Reviewed With patient   Plan of Care Review   Progress no change   OTHER   Outcome Summary PRN pain med admin for c/o pain and prior to care/repositioning to maintain comfort. Pt appears to have rested comfortably between care. Pt AA&O appropriately and able to voice needs/concerns appropriately.  Pt repositioned to back and L only d/t recent R hip surgery. Gdau at [facility]. Continue to monitor and tx per POC and MD orders.        Problem: Skin Injury Risk (Adult)  Goal: Skin Health and Integrity  Outcome: Ongoing (interventions implemented as appropriate)      Problem: Fall Risk (Adult)  Goal: Absence of Fall  Outcome: Ongoing (interventions implemented as appropriate)      Problem: Palliative Care (Adult)  Goal: Maximized Comfort  Outcome: Ongoing (interventions implemented as appropriate)    Goal: Enhanced Quality of Life  Outcome: Ongoing (interventions implemented as appropriate)

## 2019-08-01 NOTE — PLAN OF CARE
"Problem: Patient Care Overview  Goal: Plan of Care Review  Outcome: Ongoing (interventions implemented as appropriate)   08/01/19 1434   Coping/Psychosocial   Plan of Care Reviewed With patient;family;durable power of ;grandchild(ashley)   Plan of Care Review   Progress declining   OTHER   Outcome Summary Pt requested to be medicated prior to turns for pain control. Medicated in between during bath for pain and anxiety. Pt was sleeping for afternoon turn and granddaughter at bedside asked to let pt sleep. RN spoke with pogirma and his wife who wants comfort care only for the pt. The granddaughter expressed to the nurse that she wants aggressive measures for the pt. Granddaughter Heidi questioned why we were not drawing hemoglobin labs or CXR after pt had episode of aspiration. Granddaughter and pt educated about aspiration precautions. RN asked pt if what her goals are, which responded as comfort. Jessica asked pt \"do you want them to continue doing nothing or to actually do something for you.\" RN educated pt and granddaughter what palliative care is and how they are consistent with goals of care. Dr. Peterson later had this conversation with family. Rupali told RN after this conversation that granddaughter is still not agreeable but that they as the poa want to continue comfort care. Pt is resting comfortably currently. Will continue to monitor.     Goal: Individualization and Mutuality  Outcome: Ongoing (interventions implemented as appropriate)    Goal: Discharge Needs Assessment  Outcome: Ongoing (interventions implemented as appropriate)      Problem: Skin Injury Risk (Adult)  Goal: Skin Health and Integrity  Outcome: Ongoing (interventions implemented as appropriate)      Problem: Fall Risk (Adult)  Goal: Absence of Fall  Outcome: Ongoing (interventions implemented as appropriate)      Problem: Palliative Care (Adult)  Goal: Maximized Comfort  Outcome: Ongoing (interventions implemented as appropriate)    Goal: " Enhanced Quality of Life  Outcome: Ongoing (interventions implemented as appropriate)

## 2019-08-01 NOTE — PROGRESS NOTES
Palliative Care Sw followed up with patient's family (POA and wife) to provide psychosocial support. Family discord related to understanding patient's decline and poor prognosis. Family requesting support provided to granddaughters having difficulty with understanding patient's condition. Per son, granddaughters have been patient's caregivers for the past several years. Patient's son and daughter in law want to continue comfort measures and allow patient to rest. Continued to provide psychosocial support. Attempted to meet with granddaughter, however, she was not at bedside at this time. Will continue to follow to provide support as needed.

## 2019-08-02 NOTE — PROGRESS NOTES
Discharge Planning Assessment  [facility]     Patient Name: Isela Ewing  MRN: [id number]  Today's Date: 8/2/2019    Admit Date: 7/24/2019    Discharge Needs Assessment    No documentation.       Discharge Plan     Row Name 08/02/19 1302       Plan    Plan Comments  Spoke with Juan and explained [facility] services and he would like to meet with jacob/NICCI/Aime today at 15:00 to sign consents for a [facility] scattered bed. Notified jacob with [facility]. WINLER Beth RN, CCP.         Destination      Service Provider Request Status Selected Services Address Phone Number Fax Number    [facility] [address] [facility] [id number] [phone]       Shruthi Marie RN 7/29/2019 1020    7/29/2019 first choice, per son Juan, spoke with Lluvia she will follow.                [facility] Pending - Request Sent N/A [address] [facility] [address] [phone]       Shruthi Marie RN 7/29/2019 1023    7/29/2019 second choice, spoke with May she will follow.                [facility] Declined  Family requested another facility as first choice N/A [address] [facility] [address] [phone]      Durable Medical Equipment      No service coordination in this encounter.      Dialysis/Infusion      No service coordination in this encounter.      Home Medical Care      No service coordination in this encounter.      Therapy      No service coordination in this encounter.      Community Resources      No service coordination in this encounter.          Demographic Summary    No documentation.       Functional Status    No documentation.       Psychosocial    No documentation.       Abuse/Neglect    No documentation.       Legal    No documentation.       Substance Abuse    No documentation.       Patient Forms    No documentation.           Lisette Beth, RN

## 2019-08-02 NOTE — PLAN OF CARE
Problem: Patient Care Overview  Goal: Plan of Care Review  Outcome: Ongoing (interventions implemented as appropriate)   08/02/19 1379   Coping/Psychosocial   Plan of Care Reviewed With patient;durable power of ;family   Plan of Care Review   Progress declining   OTHER   Outcome Summary POA discussed with RN this AM that they have spoke with grandchildren about the situation and goals of care. POA wants pt to be comfortable. Prior to othis sarahi Grayson was at bedside and disruptive to care by answering for pt and hovering over RN and pt during care. It has improved afterthe POA's discussion. Dr. Peterson also spoke with the family again privately and discussed prognosis and goals of care. Pt stated she had lower abd pain and was medicated with morphine. An hour and a halflater pt still complained of pain and burning in her chest that happened after breakfast. Pt was medicated with an increased dose of morphine and ativan. Pt has denied pain since and also has been resting comfortably. [facility] has been consulted. Will continue to monitor and provide comfort care.     Goal: Individualization and Mutuality  Outcome: Ongoing (interventions implemented as appropriate)    Goal: Discharge Needs Assessment  Outcome: Ongoing (interventions implemented as appropriate)      Problem: Skin Injury Risk (Adult)  Goal: Skin Health and Integrity  Outcome: Ongoing (interventions implemented as appropriate)      Problem: Fall Risk (Adult)  Goal: Absence of Fall  Outcome: Ongoing (interventions implemented as appropriate)      Problem: Palliative Care (Adult)  Goal: Maximized Comfort  Outcome: Ongoing (interventions implemented as appropriate)    Goal: Enhanced Quality of Life  Outcome: Ongoing (interventions implemented as appropriate)

## 2019-08-02 NOTE — PLAN OF CARE
Problem: Patient Care Overview  Goal: Plan of Care Review  Outcome: Ongoing (interventions implemented as appropriate)   08/02/19 5534   Coping/Psychosocial   Plan of Care Reviewed With patient;durable power of    Plan of Care Review   Progress no change   OTHER   Outcome Summary G'son Kenan at [facility], he is attentive and somewhat hovering to point of disruptive to care. When questions are directed to pt for her needs/wishes/desires, Kenan is at [facility] and attempts to answer for pt and persuade her not to take med for pain control. Pt was questioned regarding pain, and Kenan responded for pt: Gma you just had pain med at 2030, it's too soon for more medicine, you don't need anything now do you. Kenan was encouraged to allow pt to respond how she feels and if she needs pain control. Kenan educated that meds are available.  Kenan requested scheduled eye gtts be restarted, he was politely encouraged to direct questions/comments/concerns r/t pt POC/GOC to KRISTAN Juan Kaylin pts son. This RN spoke with Mr Ewing via telephone and again confirmed GOC/POC, Mr Ewing confirmed comfort care is the goal, and eye gtts may be restarted, MD notified and order obtained. Pt able to voice needs/concerns appropriately, pt has had c/o pain and has been pretx prior to care/repositioning. Pt with slight sx of anxiety and required multiple repositioning and meds for comfort, pt may benefit from low dose ativan QHS for uninterrupted sleep/rest. Continue to monitor and tx per POC and MD orders.        Problem: Skin Injury Risk (Adult)  Goal: Skin Health and Integrity  Outcome: Ongoing (interventions implemented as appropriate)      Problem: Fall Risk (Adult)  Goal: Absence of Fall  Outcome: Ongoing (interventions implemented as appropriate)      Problem: Palliative Care (Adult)  Goal: Maximized Comfort  Outcome: Ongoing (interventions implemented as appropriate)    Goal: Enhanced Quality of Life  Outcome: Ongoing (interventions implemented as  appropriate)

## 2019-08-02 NOTE — PROGRESS NOTES
Discharge Planning Assessment  [facility]     Patient Name: Isela Ewing  MRN: [id number]  Today's Date: 8/2/2019    Admit Date: 7/24/2019    Discharge Needs Assessment    No documentation.       Discharge Plan     Row Name 08/02/19 1525       Plan    Plan Comments  Received a call from Juan and states he will need to change the appointment time with [facility] until 8/3/19 @ 09:30. Called [facility] and the time was changed and Flor Metcalf/RN/[facility] will meet with Juan on 8/3/19 @ 09:30 for [facility] to evaluate. WILNER Beth RN, CCP    Row Name 08/02/19 1302       Plan    Plan Comments  Spoke with Juan and explained [facility] services and he would like to meet with jacob/NICCI/[facility] today at 15:00 to sign consents for a [facility] scattered bed. Notified jacob with [facility]. WILNER Beth RN, CCP.         Destination      Service Provider Request Status Selected Services Address Phone Number Fax Number    [facility] [address] [facility] [address] [phone]       Shruthi Marie RN 7/29/2019 1020    7/29/2019 first choice, per son Juan, spoke with Lluvia she will follow.                [facility] [place] Pending - Request Sent N/A [address] [facility] [address] [phone]       Shruthi Marie RN 7/29/2019 1023    7/29/2019 second choice, spoke with May she will follow.                [facility] Declined  Family requested another facility as first choice N/A [address] [facility] [address] [phone]      Durable Medical Equipment      No service coordination in this encounter.      Dialysis/Infusion      No service coordination in this encounter.      Home Medical Care      No service coordination in this encounter.      Therapy      No service coordination in this encounter.      Community Resources      No service coordination in this encounter.          Demographic Summary    No documentation.        Functional Status    No documentation.       Psychosocial    No documentation.       Abuse/Neglect    No documentation.       Legal    No documentation.       Substance Abuse    No documentation.       Patient Forms    No documentation.           Lisette Beth RN

## 2019-08-02 NOTE — H&P
Palliative Care/Hospice Admit/Consult Note       Referring Provider: Brian White MD  Reason for Consultation: Palliative/comfort care  Date of Admission:  7/24/2019    Patient Care Team:  Rebecca Brower MD as PCP - General (Internal Medicine)  Kimani Peterson MD as Consulting Physician (Hospice and Palliative Medicine)    Chief complaint: That is post right hip fracture and ORIF    History of present illness:  The patient is a 89 y.o. female that presented to [facility] complaining of right hip pain. Patient reported a mechanical fall while putting something back into the refrigerator. She stateed that she lost her balance and fell to her right hip. She denied any other injuries, hitting head or losing consciousness. Patient denied fever, chills, chest pain, shortness of breath, abdominal pain, changes in bowel or bladder habits, edema. Xray done in ED showed a right intertrochanteric femur fracture and ortho was consulted.  Femur IM rodding/nailing performed 7/26/2019.    Postoperatively, the patient had multiple complications.  The patient appears to have chronic kidney disease disease which is stable.  On July 26, 2019, her GFR was reported at 65?.  However, lab work from 2017 and 2018 demonstrated GFR ranging between 33 and 35.  All other labs in the hospital reports the GFR in the 40s.  Additionally, the patient has moderate-severe aortic stenosis and moderate-severe mitral regurgitation.  The patient is not a surgical are a TAVR candidate.    On admission her hemoglobin was 12.9.  There was a low hemoglobin of 6.4 and she was transfused with blood.  Hemoglobin was 10.5 on 7/29/2019.  On 7/31/2019, blood decreased to 8.3.  Dr. Wolf of hematology/medical oncology reported that her hemoglobin was deteriorating.  He strongly believe that the patient has a primary bone marrow disorder that has not surfaced into the peripheral blood.  He stated that he would not be surprised if she  had an acute leukemia or a severe case of myelodysplasia.  Additionally, she did have thrombocytopenia that is rebounding somewhat.    At the time of my examination, the patient was awake.  She knew she was in [facility].  She thought it was 2009.  She did not know who the president was.  She has not had an appetite.  The family describes that she coughs with liquids.  She has no chest pain to suggest angina.  She has some pain in her back.  She has no pain in her extremities including her operative site.  She has no complaints of shortness of air.      Review of Systems  Pertinent items are noted in HPI    Palliative Performance Scale  Palliative Performance Scale Score: 30%  [place] Symptom Assessment System Revised  Pain Score: 4   ESAS Tiredness Score: 6  ESAS Nausea Score: No nausea  ESAS Depression Score: No depression  ESAS Anxiety Score: No anxiety  ESAS Drowsiness Score: 4  ESAS Lack of Appetite Score: 5  ESAS Wellbeing Score: Best wellbeing  ESAS Dyspnea Score: No shortness of breath  ESAS Other Problem Score: Best possible response  ESAS Source of Information: patient, healthcare professional caregiver  ESAS Intervention: medicated/see MAR  ESAS Intervention Response: tolerated    History  Past Medical History:   Diagnosis Date   • CHF (congestive heart failure) (CMS/HCC)    ,   Past Surgical History:   Procedure Laterality Date   • FEMUR IM NAILING/RODDING Right 7/26/2019    Procedure: RIGHT HIP INTRAMEDULLARY NAILING;  Surgeon: Jerome Forbes MD;  Location: [facility];  Service: Orthopedics   ,   Family History   Problem Relation Age of Onset   • Cancer Neg Hx     and   Social History     Tobacco Use   • Smoking status: Never Smoker   Substance Use Topics   • Alcohol use: No     Frequency: Never   • Drug use: No       Vital Signs   Temp:  [97.2 °F (36.2 °C)-97.9 °F (36.6 °C)] 97.9 °F (36.6 °C)  Heart Rate:  [] 114  Resp:  [16-18] 16  BP: (152-166)/(68-70) 166/70  Device (Oxygen  Therapy): nasal cannulaFlow (L/min):  [2] 2SpO2:  [96 %-98 %] 96 %    Physical Exam:  General Appearance:   Awake and appears in no acute distress   Head:    Normocephalic, without obvious abnormality, atraumatic   Eyes:            Lids and lashes normal, conjunctivae and sclerae normal, no   icterus   Ears:    Ears appear intact with no abnormalities noted   Throat:   No oral lesions, oral mucosa moist   Neck:   No adenopathy, supple, trachea midline, no thyromegaly   Back:     No scoliosis present   Lungs:     Clear to auscultation, respirations regular and not labored    Heart:    Regular rhythm and tachycardia, + murmur   Breast Exam:    Deferred   Abdomen:   Occasional bowel sounds, soft and non-tender, non-distended   Genitalia:    Deferred   Extremities:  Right > left LE edema, SCDs, no cyanosis    Pulses:  Radial pulses palpable and equal bilaterally   Skin:   No bleeding         Neurologic:   Awake and oriented to person and place.  She denies any new focal deficits      Results Review:   I reviewed the patient's new clinical results.      Impression:      Closed intertrochanteric fracture of right hip, initial encounter (CMS/[facility])    Closed fracture of right hip requiring operative repair with routine healing; 7/26/2019    Palliative care by specialist    JOANNE (acute kidney injury) (CMS/[facility])    Aortic stenosis; moderate-severe    Left ventricular diastolic dysfunction    Non-rheumatic mitral regurgitation; moderate-severe    Pulmonary hypertension (CMS/[facility])    Postoperative anemia due to acute blood loss    Oropharyngeal dysphagia    HTN (hypertension)        Plan:  I reviewed with the RN.  Subsequently, I reviewed with the patient's son, POA, his wife, the other son and his wife, and a granddaughter by phone.  The POA is comfortable with decisions made and comments outlined by hematology/medical oncology and cardiology:  Hematology/medical oncology reported that going to palliative care is the patient's  best option at this time.  He suggested to stop doing blood work and to give her comfort care to the end.  Cardiology reported that she is just too weak and will not be able to recover from this.  He agreed with palliative care and strongly recommended that it is in line with what she has voiced before.    However, the other son's wife and granddaughter wishes more aggressive care?  The question why physical therapy is not being performed.  I explained the dangers of stressing the patient presently.  They wanted her up in a chair which I agree with.  However, when asked, the patient was asleep and they declined it.  They question why a swallow study is not being performed.  However, with further discussion with him, the patient is coughing with liquid intake.  Therefore, she most likely has oropharyngeal dysphasia.  Therefore, I recommended and they wish for her to be placed on thickened liquids with a dysphasia diet and follow her results.  Additionally, the patient has decreased appetite which has stated it was due to her deteriorating condition.  They question why the transfusions and blood checking is not being performed.  I outlined hematology/medical oncology's note.  Additionally, drawing blood would contribute to quicker blood loss anemia.    I tried to explain to them that the patient had very little reserve and suffering the hip fracture and going to surgery has caused her to be in the condition that she presently is in.  I will reiterate Dr. Tim Poe's note to all: Dr. Poe knows the patient and states palliative care is what he strongly recommends and it is in line with what she has voiced to him before.    Total contact time greater than 50 minutes with greater than 50% of the time involved with discussion with the POA and family.      Kimani Peterson MD  Hospice and Palliative Medicine  08/01/19  9:44 PM

## 2019-08-03 PROBLEM — Z51.5 HOSPICE CARE: Status: ACTIVE | Noted: 2019-01-01

## 2019-08-03 PROBLEM — N17.9 AKI (ACUTE KIDNEY INJURY) (HCC): Status: RESOLVED | Noted: 2019-01-01 | Resolved: 2019-01-01

## 2019-08-03 PROBLEM — I50.1 CHRONIC LEFT-SIDED CONGESTIVE HEART FAILURE (HCC): Status: ACTIVE | Noted: 2019-01-01

## 2019-08-03 NOTE — PROGRESS NOTES
Palliative Care/Hospice Follow Up Note       LOS: 9 days   Patient Care Team:  Rebecca Brower MD as PCP - General (Internal Medicine)  Kimani Peterson MD as Consulting Physician (Hospice and Palliative Medicine)    Chief Complaint:  Status post right hip fracture and ORIF    Interval History:     Patient Complaints: None  Patient Denies:  None  History taken from:  Son, not POA, and his wife and granddaughter    Review of Systems:  As above    Palliative Performance Scale  Palliative Performance Scale Score: 30%  [place] Symptom Assessment System Revised  Pain Score: no pain   ESAS Tiredness Score: 7  ESAS Nausea Score: No nausea  ESAS Depression Score: No depression  ESAS Anxiety Score: No anxiety  ESAS Drowsiness Score: 7  ESAS Lack of Appetite Score: 5  ESAS Wellbeing Score: 5  ESAS Dyspnea Score: No shortness of breath  ESAS Other Problem Score: Best possible response  ESAS Source of Information: healthcare professional caregiver  ESAS Intervention: medicated/see MAR  ESAS Intervention Response: tolerated    Vital Signs  Temp:  [97 °F (36.1 °C)-97.2 °F (36.2 °C)] 97.2 °F (36.2 °C)  Heart Rate:  [92-98] 98  Resp:  [16-18] 18  BP: (150-152)/(66-71) 150/66  Device (Oxygen Therapy): nasal cannulaFlow (L/min):  [2] 2SpO2:  [94 %-96 %] 96 %    Physical Exam:  General Appearance:   Minimally arouses and appears in no acute distress   Throat:   No oral lesions, oral mucosa moist   Neck:   No adenopathy, supple, trachea midline   Lungs:     Clear to auscultation, occasional crackle, diminished, respirations regular    Heart:    Regular rhythm and normal rate, + murmur   Abdomen:     Occasional bowel sounds, soft and non-tender, non-distended   Extremities:  Right greater than left lower extremity edema, SCDs, no cyanosis   Pulses:   Radial pulses palpable and equal bilaterally          Results Review:     I reviewed the patient's new clinical results.    Medication Reviewed.    Assessment/Plan       Closed  intertrochanteric fracture of right hip, initial encounter (CMS/[facility])    Closed fracture of right hip requiring operative repair with routine healing; 7/26/2019    Palliative care by specialist    JOANNE (acute kidney injury) (CMS/[facility])    Aortic stenosis; moderate-severe    Left ventricular diastolic dysfunction    Non-rheumatic mitral regurgitation; moderate-severe    Pulmonary hypertension (CMS/[facility])    Postoperative anemia due to acute blood loss    Oropharyngeal dysphagia    HTN (hypertension)    I examined the patient and reviewed with the RN.  Presently, the patient appears comfortable.  It was reported that the patient had some burning chest pain which was relieved by medications provided.  The patient has received 2 doses of 2 mg dose of 4 mg morphine (7 doses yesterday) and 1 dose of 0.5 mg Ativan thus far today.     Outside of the room, I reviewed the patient's chart with the son, his wife, and granddaughter.  I reviewed Dr. Poe's last note and I also reviewed Dr. Wolf' last note.  I reviewed the echocardiogram recently obtained as well as the echocardiogram from 2017 described in Dr. Poe's note.  I pointed out how Dr. Poe reported to her as a high risk.  The patient's granddaughter said that she made it through surgery and therefore she thought all would be all right.  I told her that the surgical risk includes the postoperative time.  The son reported that his mother was essentially blind but she knew her home very well.  They explained she was pulling on the refrigerator and fell back onto her hip.  I showed them the hip fracture.  Again, I explained that the hip fracture and anesthesia and surgery was too much for the patient and she did not have any reserve to overcome the total insult.      The granddaughter was concerned why you medicines were discontinued.  Last night, I did restart the patient's Xalatan eyedrops.  She was also concerned that the patient was not getting her metoprolol.  I  told her these medicines were stopped because morphine and Ativan can drop the patient's blood pressure and we did not want to cause harm from treating her symptoms.    I explained hospice scattered bed to them.  Subsequently, I discussed with discharge planner and hospice will be meeting with the POA to admit the patient as a hospice scatter bed.    Greater than 30-minute contact time with greater than 50% of the time in counseling.    Plan for disposition: Pending.    Kimani Peterson MD  Hospice and Palliative Medicine  08/02/19  8:57 PM

## 2019-08-03 NOTE — CONSULTS
Met with pt's son/POA Juan Ewing, CHANDA Romo to provide EOS and discuss GOC.  Pt family vu of and are agreeable [facility] services, SB admission, hospice care.  Consents signed by the pt's son.  [facility] MD Dr. Marquis Cano gave verbal certification of medical eligibility and approval for SB admission.  POC discussed with Dr. Peterson and facility nurse Nadja.  Dr. Peterson to discharge/re-admit pt to [facility] SB today.  Pt admission to [facility] services completed this visit.  Thank you for allowing us to be a part of Mrs. Romo's care.  Please call with any questions, needs, or changes to the POC    Patient ID# [id number]  [facility]: [phone]    Flor Metcalf RN, BSN  Referral and Admissions Coordinator

## 2019-08-03 NOTE — PLAN OF CARE
Problem: Patient Care Overview  Goal: Plan of Care Review  Outcome: Ongoing (interventions implemented as appropriate)   08/03/19 0553   Coping/Psychosocial   Plan of Care Reviewed With patient;family   Plan of Care Review   Progress no change   OTHER   Outcome Summary Pt was medicated x2 with morphine, x1 with ativan, and x1 with tylenol. Pt appeared to have rested well between care. 2 granddaughters at bedside tonight do hover at bedside during pt care but have been appropriate and cooperative with RN. Family to meet with [facility] today. Will conitnue to monitor.     Goal: Individualization and Mutuality  Outcome: Ongoing (interventions implemented as appropriate)      Problem: Skin Injury Risk (Adult)  Goal: Skin Health and Integrity  Outcome: Ongoing (interventions implemented as appropriate)      Problem: Fall Risk (Adult)  Goal: Absence of Fall  Outcome: Ongoing (interventions implemented as appropriate)      Problem: Palliative Care (Adult)  Goal: Maximized Comfort  Outcome: Ongoing (interventions implemented as appropriate)    Goal: Enhanced Quality of Life  Outcome: Ongoing (interventions implemented as appropriate)

## 2019-08-03 NOTE — PLAN OF CARE
Problem: Fall Risk (Adult)  Goal: Absence of Fall  Outcome: Ongoing (interventions implemented as appropriate)      Problem: Palliative Care (Adult)  Goal: Maximized Comfort  Outcome: Ongoing (interventions implemented as appropriate)    Goal: Enhanced Quality of Life  Outcome: Ongoing (interventions implemented as appropriate)      Problem: Patient Care Overview  Goal: Plan of Care Review  Outcome: Ongoing (interventions implemented as appropriate)   08/03/19 4777   Coping/Psychosocial   Plan of Care Reviewed With patient;family   Plan of Care Review   Progress no change   OTHER   Outcome Summary pt premedicated w/ morphine before turns & declined the ativan. Pt reports her neck hurting, applied warm compresses to the neck. Family at bedside, will continue to monitor      Goal: Individualization and Mutuality  Outcome: Ongoing (interventions implemented as appropriate)      Problem: Skin Injury Risk (Adult)  Goal: Skin Health and Integrity  Outcome: Ongoing (interventions implemented as appropriate)

## 2019-08-03 NOTE — DISCHARGE SUMMARY
Date of Admission:   7/24/2019  Date of Discharge:   8/3/2019    Discharge Diagnosis:     Closed intertrochanteric fracture of right hip, initial encounter (CMS/[facility])    Closed fracture of right hip requiring operative repair with routine healing; 7/26/2019    Palliative care by specialist    Aortic stenosis; moderate-severe    Left ventricular diastolic dysfunction    Non-rheumatic mitral regurgitation; moderate-severe    Pulmonary hypertension (CMS/[facility])    Postoperative anemia due to acute blood loss    Oropharyngeal dysphagia    HTN (hypertension)      Hospital Course  Patient is a 89 y.o. female presented to the emergency department 7/24/2019 following a fall.  The patient had a right hip fracture and did undergo femur IM rodding and nailing on 7/26/2019.  The patient has a history of moderate-severe aortic stenosis and moderate-severe mitral regurgitation.  The patient was not a surgical candidate or a TAVR candidate.  Dr. Tim Poe reported that the patient was a high risk for surgery.  The patient did survive surgery.  However, postoperatively, she has not done well.  Postoperatively, the patient has had problems with hemoglobin and platelets.  Dr. 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    Prior to hospital admission, the patient was living alone.  The patient is blind.  The patient's son, POA, understands her condition and agrees with hospice evaluation.  Other members of the family are concerned that she is not getting better.  I reviewed with many family members 8/2/2019 outlining the patient's multiple comorbidities.    Subsequently, the patient's POA reviewed with hospice and the patient is being discharged from acute care and readmitted as a hospice scattered bed.    At the time of my examination, I did discuss with the patient's 2 granddaughters at bedside.  The patient is awake and is complaining of neck pain.  She denies shortness of  breath at present.  She ate some breakfast without having chest discomfort.  She denies shortness of breath at rest.  She has not had a bowel movement but has no complaints of needing to have a bowel movement.  She has a Quan catheter in place.    I reviewed with the RN and I reviewed with the hospice RN.    Procedures Performed    Procedure(s):  RIGHT HIP INTRAMEDULLARY NAILING  -------------------       Consults:   Consults     Date and Time Order Name Status Description    7/27/2019 1414 Hematology & Oncology Inpatient Consult Completed     7/24/2019 2042 Inpatient Cardiology Consult Completed     7/24/2019 1842 Inpatient Orthopedic Surgery Consult Completed     7/24/2019 1714 Ortho (on-call MD unless specified) Completed     7/24/2019 1714 [facility] (on-call MD unless specified) Details Completed           Pertinent Test Results: Reviewed    Condition on Discharge: Poor    Palliative Performance Scale  Palliative Performance Scale Score: 30%  [place] Symptom Assessment System Revised  Pain Score: no pain   ESAS Tiredness Score: 6  ESAS Nausea Score: No nausea  ESAS Depression Score: No depression  ESAS Anxiety Score: No anxiety  ESAS Drowsiness Score: 6  ESAS Lack of Appetite Score: 6  ESAS Wellbeing Score: 5  ESAS Dyspnea Score: No shortness of breath  ESAS Other Problem Score: Best possible response  ESAS Source of Information: patient, healthcare professional caregiver  ESAS Intervention: medicated/see MAR  ESAS Intervention Response: tolerated    Vital Signs  Temp:  [97.2 °F (36.2 °C)-99 °F (37.2 °C)] 99 °F (37.2 °C)  Heart Rate:  [] 103  Resp:  [16-18] 16  BP: (150-159)/(66-73) 159/73  Device (Oxygen Therapy): nasal cannulaFlow (L/min):  [2] 2SpO2:  [95 %-96 %] 95 %    Physical Exam:  General Appearance:   Awake and appears in no acute distress   Head:    Normocephalic, without obvious abnormality, atraumatic   Eyes:           Lids and lashes normal, conjunctivae and sclerae normal, no   icterus   Ears:     Ears appear intact with no abnormalities noted   Throat:   No oral lesions, oral mucosa moist   Neck:   No adenopathy, supple, trachea midline, no thyromegaly       Lungs:     Clear to auscultation, occasional crackle, respirations regular and not labored    Heart:    Regular rhythm and tachycardia, + murmur       Abdomen:   Occasional bowel sounds, soft and non-tender, non-distended       Extremities:  Right > left LE edema, SCDs, no cyanosis    Pulses:  Radial pulses palpable and equal bilaterally   Skin:   No bleeding         Neurologic:   Awake and oriented to person and place.  She denies any new focal deficits            Discharge Disposition  Hospice/Medical Facility ([facility])    Discharge Medications: Same MAR    Discharge Diet: Dysphagia with thickened liquids    Activity at Discharge: As tolerated    Follow-up Appointments  Future Appointments   Date Time Provider Department Center   8/30/2019  2:30 PM Esthela Albright APRN [id number] None         Test Results Pending at Discharge: None       Kimani Peterson MD  08/03/19  10:37 AM    Time: Discharge 35 min

## 2019-08-04 NOTE — PROGRESS NOTES
Palliative Care/Hospice Admit/Consult Note       Referring Provider: Aime  Reason for Consultation: Hospice Care  Date of Admission:  8/3/2019    Patient Care Team:  Rebecca Brower MD as PCP - General (Internal Medicine)  Kimani Peterson MD as Consulting Physician (Hospice and Palliative Medicine)    Chief complaint:  CHF    History of present illness:  The patient is a 89 y.o. female who presented to the emergency department 7/24/2019 following a fall.  The patient had a right hip fracture and did undergo femur IM rodding and nailing on 7/26/2019.  The patient has a history of moderate-severe aortic stenosis and moderate-severe mitral regurgitation.  The patient was not a surgical candidate or a TAVR candidate.  Dr. Tim Poe reported that the patient was a high risk for surgery.  The patient did survive surgery.  However, postoperatively, she has not done well.  Postoperatively, the patient has had problems with hemoglobin and platelets.  Dr. 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     Prior to hospital admission, the patient was living alone.  The patient is blind.  The patient's son, POA, understands her condition and agrees with hospice evaluation.  Other members of the family are concerned that she is not getting better.  I reviewed with many family members 8/2/2019 outlining the patient's multiple comorbidities.     Subsequently, the patient's POA reviewed with hospice and the patient was discharged from acute care and readmitted as a hospice scattered bed on 8/3/2019.    At the time of my examination, 2 granddaughters were at bedside.  The patient voiced no complaints and had no questions.  She denied pain or significant shortness of breath.  She is passing gas but has not had a bowel movement.    Review of Systems  Pertinent items are noted in HPI    Palliative Performance Scale  Palliative Performance Scale Score: 30%  [place]  Symptom Assessment System Revised  Pain Score: no pain   ESAS Tiredness Score: 7  ESAS Nausea Score: No nausea  ESAS Depression Score: No depression  ESAS Anxiety Score: 4(offered ativan, pt/family refused)  ESAS Drowsiness Score: 6  ESAS Lack of Appetite Score: 6  ESAS Wellbeing Score: 4  ESAS Dyspnea Score: No shortness of breath  ESAS Source of Information: patient  ESAS Intervention: medicated/see MAR  ESAS Intervention Response: tolerated    History  Past Medical History:   Diagnosis Date   • CHF (congestive heart failure) (CMS/HCC)    ,   Past Surgical History:   Procedure Laterality Date   • FEMUR IM NAILING/RODDING Right 7/26/2019    Procedure: RIGHT HIP INTRAMEDULLARY NAILING;  Surgeon: Jerome Forbes MD;  Location: [facility];  Service: Orthopedics   ,   Family History   Problem Relation Age of Onset   • Cancer Neg Hx     and   Social History     Tobacco Use   • Smoking status: Never Smoker   Substance Use Topics   • Alcohol use: No     Frequency: Never   • Drug use: No       Vital Signs   Temp:  [97.4 °F (36.3 °C)-98.6 °F (37 °C)] 98.6 °F (37 °C)  Heart Rate:  [] 100  Resp:  [16-18] 16  BP: (157-178)/(74-75) 157/74  Device (Oxygen Therapy): nasal cannulaFlow (L/min):  [2] 2SpO2:  [95 %] 95 %    Physical Exam:  General Appearance:    Awake and appears in no acute distress while sitting up being fed   Head:    Normocephalic, without obvious abnormality, atraumatic   Eyes:            Lids and lashes normal, conjunctivae and sclerae normal, no   icterus   Ears:    Ears appear intact with no abnormalities noted   Throat:   No oral lesions, oral mucosa moist   Neck:   No adenopathy, supple, trachea midline, no thyromegaly       Lungs:     Clear to auscultation, occasional crackle, respirations regular and not labored    Heart:    Regular rhythm and tachycardia, + murmur       Abdomen:   Occasional bowel sounds, soft and non-tender, slightly rounded and slightly distended       Extremities:   Right  greater than left lower extremity edema, SCDs in place, no cyanosis    Pulses:  Radial pulses palpable and equal bilaterally   Skin:   No bleeding             Results Review:   I reviewed the patient's new clinical results.      Impression:      Chronic left-sided congestive heart failure (CMS/HCC)    Hospice care    Aortic stenosis; moderate-severe    Non-rheumatic mitral regurgitation; moderate-severe    Pulmonary hypertension (CMS/HCC)    Closed fracture of right hip requiring operative repair with routine healing; 7/26/2019    Postoperative anemia due to acute blood loss    Oropharyngeal dysphagia    HTN (hypertension)        Plan:  I examined the patient and reviewed with the RN.  I also reviewed with 2 granddaughters at bedside.  Presently, the patient appears comfortable.  The patient denies any burning chest pain or significant shortness of air.  The patient has received 1 dose of 4 mg morphine (3 doses yesterday) and no Ativan thus far today.     The granddaughter gave her an ice chip while I was there.  Her voice became wet.  I did instruct them to use the chin tuck swallow technique at times to help her clear her throat.      I recommended a Dulcolax suppository to help with bowel movement since her abdomen is more rounded.    If the patient wants, she may get up to a chair.    I answered all other questions.    Plan for disposition: [facility].        Kimani Peterson MD  Hospice and Palliative Medicine  08/04/19  10:00 AM

## 2019-08-04 NOTE — PLAN OF CARE
Problem: Patient Care Overview  Goal: Plan of Care Review  Outcome: Ongoing (interventions implemented as appropriate)   08/04/19 7943   Coping/Psychosocial   Plan of Care Reviewed With patient;family   Plan of Care Review   Progress no change   OTHER   Outcome Summary pt & family educated on Ativan, its effects. Pt given ativan x2 along with morphine. Pt given suppository for constipation & abdominal discomfort. Family remains at bedside. Will continue to monitor        Problem: Fall Risk (Adult)  Goal: Absence of Fall  Outcome: Ongoing (interventions implemented as appropriate)      Problem: Palliative Care (Adult)  Goal: Maximized Comfort  Outcome: Ongoing (interventions implemented as appropriate)    Goal: Enhanced Quality of Life  Outcome: Ongoing (interventions implemented as appropriate)      Problem: Patient Care Overview  Goal: Individualization and Mutuality  Outcome: Ongoing (interventions implemented as appropriate)      Problem: Skin Injury Risk (Adult)  Goal: Skin Health and Integrity  Outcome: Ongoing (interventions implemented as appropriate)

## 2019-08-04 NOTE — PROGRESS NOTES
"[facility] Visit Report    Isela Ewing  [id number]  8/4/2019    Admission R/T [facility] Dx: Pending    Reason for [facility] Admission: Heart failure    Symptom  Management: Pain control    Nursing/Medication Recommendations: Continue symptom management of pain control; assess for s/s further decline    Psychosocial Issues and Recommendations:    Spiritual Concerns and Recommendations:    [facility]  No orders for discharge. Patient is receiving IV medications for symptom management of pain and facility staff titrating medications for maximum comfort. At this time patient continues to meet GIP criteria. Will continue to monitor for s/s further decline    Review of Visit: Collaborated with facility charge RNWalter and patients RN, Nadja; continued education needed for family with s/s declining status and symptom management. Nadja reports family continuing to feed patient despite patient \"sounding wet with ice chips\"; family has been educated by facility RN, Dr Peterson and myself. Nadja also reports patient with c/o increased pain to her neck today, patient has been medicated with IV Morphine and IV Ativan today.  Upon arrival to room patient is lying in bed with HOB elevated, her eyes are closed and she appears comfortable and peaceful. 3 family members are at bedside, I intorduced myself to them. Family that is present is concerned about patient receiving IV Ativan, \"now she's knocked out\". I d/w family s/e of medications of drowsiness, increased sleep over first several hours of administering medications. Family reports, \"she's not used to taking anything like that\". I went on to explain patients chronic illnesses and decline since fall and having surgery. We discussed the process the body undergoes when someone is entering into the active phases of dying. I also discussed symptom management with them and use of medications; will continue to educate during this transition. Upon assessment " "patient does awaken to name, she is extremely [place]. She does tell me, \"I'm sleepy\"; she denies pain, SOA, n/v at this time. Respirations are even and shallow on O2 at 3L/NC; lungs are diminished throughout. Abdomen is distended with positive bowel sounds. Facility RNNadja, reports patient received bisacodyl suppository for no BM x's several days. F/C is noted with dark yellow to dawna urine to bedside drain. Pedal pulses are palpable bilaterally; right foot is noted with increased edema vs. left. Right foot also noted with areas of mottling at base of toes. Right hip is noted in an island dressing with a few areas of dried blood noted. Patients most recent v/s are: T 98.6, , RR 16, B/P 157/74, sats 95%. After my visit I did call and speak to Juan RUSHING, via telephone. Juan was updated on patients current condition and the fact that she does appear to be very comfortable. Juan inquired about his mother receiving IV Ativan and I d/w Juan and explained symptom management as well,  he does v/u and is appreciative of update. I also spoke to RNNadja, again and updated her on my conversation with family that is present and also phone call with Juan. In the last 24 hours patient has received IV Ativan 0.5mg  x2 doses and IV Morphine 4mg x5 doses. Will continue to visit daily, assess needs of patient/family and provide support        Angelica Ochoa RN  [facility] Visit Nurse  "

## 2019-08-04 NOTE — PLAN OF CARE
Problem: Patient Care Overview  Goal: Plan of Care Review  Outcome: Ongoing (interventions implemented as appropriate)   08/04/19 0531   Coping/Psychosocial   Plan of Care Reviewed With patient;family   Plan of Care Review   Progress no change   OTHER   Outcome Summary pt medicated x 2 thus far with morphine. Main complaint tonight has been her neck and hhip hurting. Pt and family declined need for ativan and pt did appear comfortable between care with morphine administration. Family remains at bedside, helpful and supportive of pt. Pt has had some congested cough and reviewed with family pt aspiration risk associated with her declining status. Will conitnue to monitor.     Goal: Individualization and Mutuality  Outcome: Ongoing (interventions implemented as appropriate)   08/04/19 0531   Individualization   Patient Specific Preferences premed for turns   Patient Specific Goals (Include Timeframe) pain control and comfort   Patient Specific Interventions prn medication for comfort; turn every 4 hours and prn   Mutuality/Individual Preferences   What Anxieties, Fears, Concerns, or Questions Do You Have About Your Care? family with continued questions re: palliative care    What Information Would Help Us Give You More Personalized Care? none reported   How Would You and/or Your Support Person Like to Participate in Your Care? open communication   Mutuality/Individual Preferences   How to Address Anxieties/Fears communication and education with family       Problem: Fall Risk (Adult)  Goal: Absence of Fall  Outcome: Ongoing (interventions implemented as appropriate)      Problem: Palliative Care (Adult)  Goal: Maximized Comfort  Outcome: Ongoing (interventions implemented as appropriate)    Goal: Enhanced Quality of Life  Outcome: Ongoing (interventions implemented as appropriate)      Problem: Skin Injury Risk (Adult)  Goal: Skin Health and Integrity  Outcome: Ongoing (interventions implemented as appropriate)

## 2019-08-05 NOTE — PROGRESS NOTES
[facility] Visit Report    Isela Ewing  [id number]  8/5/2019    Admission R/T [facility] Dx: Yes    Reason for [facility] Admission: Heart Failure    Symptom  Management: Pain    Nursing/Medication Recommendations:Monitor for condition changes and medicate as directed.    Psychosocial Issues and Recommendations:    Spiritual Concerns and Recommendations:    [facility] Discharge Plans:  None.  Pt meets GIP criteria and is unsafe for transfer with [facility] to follow pt daily.    Review of Visit Daily inpatient visit:  Pt is now a pps of 20% and is minimally responsive with vacant glassy stare present and no facial grimacing or moaning present.  Pt denies pain and appears to have some dyspnea with panting present and dark diminished UOP present at bedside.  Family is present and at bedside and vu of pt having a fast decline and what to expect with further decline.  RN went over med regimen with Reina GRAJEDA and plan of care with her as well and spoke with Natasha GRAJEDA and Lisette Beth CCP and all vu poc.  [facility] to follow pt daily        Dickson Campos RN

## 2019-08-05 NOTE — NURSING NOTE
"0855: RN attempted to assess and medicate patient per plan of care discussed with sidra Juan (son).  Other son Misael was very disruptive and verbally abusive towards RN when finding out morphine and ativan will be administered.  Assessment could not be performed and medications were not administered.  RN left room and called sidra Martinez and left voicemail and called Dr. Peterson.    0908: Juan called RN back.  RN explained the situation and informed that Dr. Peterson is coming to the unit.  Juan asked that Dr. Peterson talk to Misael and if he and other family are still disruptive then \"you can call the [employer] police.\"  Juan informed that if family continues to be disruptive to care after Dr. Peterson's conversation, security may have to be called and Juan agreed that is ok.  0945: Dr. Peterson and palliative care coordinator, Natasha went to bedside to discuss goals of care to Misael.  Dr. Peterson informed Misael that the medicine wears off in 4-6 hours and that her symptoms are most likely due to her decline from the disease process.  Dr. Peterson explained the reasons needed for the medication and that legally the poa is the decision-maker.  RNs will be switched to care for the family today.  "

## 2019-08-05 NOTE — PROGRESS NOTES
[facility]  Visit Report    Isela Ewing  [id number]  2019      Review of Visit (Include All Collaboration- including names of hospital and family involved during admission/visit):  CHP and SBT SW collab with SBT NICCI Forman, who had visited a short time ago; pt kira and his wife along with their , as well as gdau present with pt,  on bed by pt's face, both  and pt appeared to be sleeping, no indication of arousing during visit; CHP and SW introduced selves, briefly explained roles, family had no concerns or needs at this time.    CHP called pt son Juan, who was apprec of the call, confirmed pt a member of [facility], [facility] Zoroastrianism in [facility] information so pt would appear on daily Eucharist list for [facility] to visit daily.  Son voiced no other concerns or questions, CHP encouraged Juan to call with any inquiries, Juan thanked P for the call.    Family will notify hospital or [facility] of any spiritual needs or concerns, CHP will respond to any needs voiced as notified.      Rev. MAGGIE Palumbo., [facility] Scattered Bed

## 2019-08-05 NOTE — PLAN OF CARE
Problem: Palliative Care (Adult)  Goal: Maximized Comfort  Outcome: Ongoing (interventions implemented as appropriate)    Goal: Enhanced Quality of Life  Outcome: Ongoing (interventions implemented as appropriate)      Problem: Patient Care Overview  Goal: Plan of Care Review   08/05/19 8074   OTHER   Outcome Summary pt awake most of shift with many family members at bedside. This RN spoke with pt piter Douglas about medciating pt with her bath due to pt excessive moaning and restlessness. pt continued to deny pain until this evening with 1700 turn and was then medicated with 2mg morphine. family has asked to wait until bedtime to give pt ativan. family has pt sitting up and is still feeding pt and giving ice chips. family has been educatd on risk of pt aspirating. family at bedside during shift, will continue to monitor and povide care.      Goal: Individualization and Mutuality   08/04/19 7339   Individualization   Patient Specific Preferences premed for turns   Patient Specific Goals (Include Timeframe) pain control and comfort   Patient Specific Interventions prn medication for comfort; turn every 4 hours and prn   Mutuality/Individual Preferences   What Anxieties, Fears, Concerns, or Questions Do You Have About Your Care? family with continued questions re: palliative care    What Information Would Help Us Give You More Personalized Care? none reported   How Would You and/or Your Support Person Like to Participate in Your Care? open communication   Mutuality/Individual Preferences   How to Address Anxieties/Fears communication and education with family       Problem: Skin Injury Risk (Adult)  Goal: Skin Health and Integrity  Outcome: Ongoing (interventions implemented as appropriate)

## 2019-08-05 NOTE — PLAN OF CARE
Problem: Patient Care Overview  Goal: Plan of Care Review  Outcome: Ongoing (interventions implemented as appropriate)   08/05/19 0500   Coping/Psychosocial   Plan of Care Reviewed With patient;durable power of    Plan of Care Review   Progress no change   OTHER   Outcome Summary Spoke with pts son Juan who is POA, at start of shift. Spoke with Juan r/t pts sx and c/o neck pain unrelieved with Morphine alone. Spoke with Juan r/t pts inability to get comfortable. Juan veralized understanding and agreement with plan for ativan to be admin with morphine for optimal pt comfort. Pt is symptomatic of restlessness and inability to find comfort d/t neck pain. Pt also reports pain in R hip, but the neck pain appears to be what is preventing pt from finding optimal comfort. At 2016 during med admin, pt silverio Gordon at [facility] and she expressed dissatisfaction that pt was recv Ativan, attempts to reassure and educate Heidi were unsuccessful, she basically told this RN not to talk to her and she didn't want to hear it. Pt was premedicated prior to turns in an attempt to maintain comfort. Heidi expressed dissatisfaction that pt was being medicated w/o asking for pain meds, the comments were directed to the pt and not to the nurse. Pt requires premedication prior to turns d/t her extreme discomfort with repositioning and care. Pt is easily arousable and able to communicate. Continue to monitor and tx per POC and MD orders.        Problem: Fall Risk (Adult)  Goal: Absence of Fall  Outcome: Ongoing (interventions implemented as appropriate)      Problem: Palliative Care (Adult)  Goal: Maximized Comfort  Outcome: Ongoing (interventions implemented as appropriate)    Goal: Enhanced Quality of Life  Outcome: Ongoing (interventions implemented as appropriate)      Problem: Skin Injury Risk (Adult)  Goal: Skin Health and Integrity  Outcome: Ongoing (interventions implemented as appropriate)

## 2019-08-06 NOTE — PLAN OF CARE
Problem: Fall Risk (Adult)  Goal: Absence of Fall  Outcome: Ongoing (interventions implemented as appropriate)      Problem: Palliative Care (Adult)  Goal: Maximized Comfort  Outcome: Ongoing (interventions implemented as appropriate)    Goal: Enhanced Quality of Life  Outcome: Ongoing (interventions implemented as appropriate)      Problem: Patient Care Overview  Goal: Plan of Care Review  Outcome: Ongoing (interventions implemented as appropriate)   08/06/19 0638   Coping/Psychosocial   Plan of Care Reviewed With patient;family   Plan of Care Review   Progress no change   OTHER   Outcome Summary Patient and son Misael had conversation with Aime RN today and decided to keep morphine on a q4 schedule timed prior to turns and PRN, to avoid ativan unless patient having notable symptoms. Pt agreeable, will continue medicating with 2mg morphine for now prior to turns but I did discuss with family possibility of need to increase dosage as patient seems to be having more trouble getting comfortable this afternoon, asking frequently for minor adjustments. Family at bedside continuously throughout day. Pt did complain of constipation this morning and was given suppository in AM, digitally disimpacted in afternoon with reported relief from patient. Continue to monitor and provided palliative care.      Goal: Individualization and Mutuality  Outcome: Ongoing (interventions implemented as appropriate)   08/04/19 0531   Individualization   Patient Specific Preferences premed for turns   Patient Specific Goals (Include Timeframe) pain control and comfort   Patient Specific Interventions prn medication for comfort; turn every 4 hours and prn   Mutuality/Individual Preferences   What Anxieties, Fears, Concerns, or Questions Do You Have About Your Care? family with continued questions re: palliative care    How Would You and/or Your Support Person Like to Participate in Your Care? open communication   Mutuality/Individual  Preferences   How to Address Anxieties/Fears communication and education with family       Problem: Skin Injury Risk (Adult)  Goal: Skin Health and Integrity  Outcome: Ongoing (interventions implemented as appropriate)

## 2019-08-06 NOTE — PROGRESS NOTES
[facility] Visit Report    Isela Ewing  [id number]  8/6/2019    Admission R/T [facility] Dx: Yes    Reason for [facility] Admission: Heart Failure    Symptom  Management: Pain    Nursing/Medication Recommendations: Schedule Morphine 2 mg IV q 4 hrs and q 1 hr prn    Psychosocial Issues and Recommendations:    Spiritual Concerns and Recommendations:    [facility] Discharge Plans:  None.  Pt meets GIP criteria and is unsafe for transfer with [facility] to follow pt daily.    Review of Visit Daily inpatient visit:  Pt is now a pps of 20% and is minimally responsive with ability to arouse when touched or talked to with dark diminished UOP present in bsd bag.  Pt is c/o pain in hip/buttuck area with 2 prn doses of IV Morphine 2 mg given since 2400.  Pt does appear more pale and is panting during visit.  RN spoke with Son Misael at length about pt current condition and went into depth about signs of progression and importance of properly medicating pt to stay ahead of pain/dyspnea and he vu and is tearful during visit.  RN spoke with him about what would happen if pt stablizes and Misael states that he doesn't want pt to leave [facility] due to seeing his father pass recently and doesn't want to relive experience.  RN recommends scheduled doses of IV Morphine and Misael vu and agrees and RN spoke with Juan RUSHING as well and he is agreeable also.  RN collaborated with Natasha GRAJEDA, Kim GRAJEDA, Dr. Peterson and Lisette Beth CCP about poc and uses of meds and all vu.  [facility] will continue to follow pt daily.        Dickson Campos RN

## 2019-08-06 NOTE — PLAN OF CARE
Problem: Patient Care Overview  Goal: Plan of Care Review  Outcome: Ongoing (interventions implemented as appropriate)   08/06/19 8262   Coping/Psychosocial   Plan of Care Reviewed With patient   Plan of Care Review   Progress improving   OTHER   Outcome Summary Medicated x2 with Morphine 2mg. Rested well in bed most of night. grandaughter stayed the night. Continue to provide comfort care.        Problem: Fall Risk (Adult)  Goal: Absence of Fall  Outcome: Ongoing (interventions implemented as appropriate)      Problem: Palliative Care (Adult)  Goal: Maximized Comfort  Outcome: Ongoing (interventions implemented as appropriate)    Goal: Enhanced Quality of Life  Outcome: Ongoing (interventions implemented as appropriate)

## 2019-08-06 NOTE — PROGRESS NOTES
Palliative Care/Hospice Follow Up Note       LOS: 2 days   Patient Care Team:  Rebecca Brower MD as PCP - General (Internal Medicine)  Kimani Peterson MD as Consulting Physician (Hospice and Palliative Medicine)    Chief Complaint:  CHF    Interval History:     Patient Complaints: None  Patient Denies:  None  History taken from:  Son, Misael, not POA, and RN    Review of Systems:  As above    Palliative Performance Scale  Palliative Performance Scale Score: 30%  [place] Symptom Assessment System Revised  Pain Score: 5   ESAS Tiredness Score: 2  ESAS Nausea Score: No nausea  ESAS Depression Score: No depression  ESAS Anxiety Score: 5  ESAS Drowsiness Score: 4  ESAS Lack of Appetite Score: 7  ESAS Wellbeing Score: 2  ESAS Dyspnea Score: No shortness of breath  ESAS Other Problem Score: Best possible response  ESAS Source of Information: healthcare professional caregiver  ESAS Intervention: medicated/see MAR  ESAS Intervention Response: tolerated    Vital Signs  Temp:  [97.3 °F (36.3 °C)-98.8 °F (37.1 °C)] 97.3 °F (36.3 °C)  Heart Rate:  [93-99] 99  Resp:  [16-18] 16  BP: (152-153)/(61-63) 152/61  Device (Oxygen Therapy): room air SpO2:  [95 %-96 %] 96 %    Physical Exam:  General Appearance:   Minimally arouses and appears in no acute distress   Throat:   No oral lesions, oral mucosa moist   Neck:   No adenopathy, supple, trachea midline   Lungs:     Clear to auscultation, occasional crackle, diminished, respirations regular    Heart:    Regular rhythm and normal rate, + murmur   Abdomen:     Occasional bowel sounds, soft and non-tender, non-distended   Extremities:  Right greater than left lower extremity edema, SCDs, no cyanosis   Pulses:   Radial pulses palpable and equal bilaterally          Results Review:     I reviewed the patient's new clinical results.    Medication Reviewed.    Assessment/Plan       Chronic left-sided congestive heart failure (CMS/HCC)    Hospice care    Aortic stenosis;  moderate-severe    Non-rheumatic mitral regurgitation; moderate-severe    Pulmonary hypertension (CMS/HCC)    Closed fracture of right hip requiring operative repair with routine healing; 7/26/2019    Postoperative anemia due to acute blood loss    Oropharyngeal dysphagia    HTN (hypertension)    I examined the patient and reviewed with the patient's son, Misael, and the RN, Natasha.  Presently, the patient appears comfortable.  It was reported that the patient son Misael and granddaughter and grandson disagreed with the RN and I was called by the RN in tears. All was accusing the RN of giving too much meds. The patient has received 1 dose of 2 mg morphine (5 doses yesterday) and 1 dose of 0.5 mg Ativan (4 doses yesterday) thus far today.     The RN originally assigned to this patient was switched to another RN.    Natasha GRAJEDA and I discussed with Misael in the room. He was concerned that we all are over medicating his mother. he was told that we are trying to medicate her when she says she is uncomfortable. Also, the family was wanting the patient turned Q2 hours? I again explained if she stabilizes and does not need or does not get meds, we may have to consider placement.     Aime Forman RN reviewed with the 3 family members present. Dickson reviewed with the new RN. I discussed with the new RN and Dickson.    The new RN also discussed with son KRISTAN Martinez, and he did not voice any concerns over his mother's care. He and his wife continue to be grateful related to her care.    Greater than 30-minute contact time with greater than 50% of the time in counseling.    Plan for disposition: [facility]    Kimani Peterson MD  Hospice and Palliative Medicine  08/05/19  8:19 PM

## 2019-08-07 NOTE — PROGRESS NOTES
[facility] Visit Report    Isela Ewing  [id number]  8/7/2019    Admission R/T [facility] Dx: Yes    Reason for [facility] Admission: Heart Failure    Symptom  Management: Pain    Nursing/Medication Recommendations:Monitor for condition changes and medicate as directed.    Psychosocial Issues and Recommendations:    Spiritual Concerns and Recommendations:    [facility] Discharge Plans:  None.  Pt meets GIP criteria and is unsafe for transfer with [facility] [place] to follow pt daily.    Review of Visit Daily inpatient visit:  Pt is now a pps of 20% and appears to be in a rally period today with being alert and oriented to self and family and main goal is being pain free which she c/o pain in her hip/buttuck area.  Pt has now had an increase in pain med from 2 to 4 mg of Morphine IV and has taken 4 prn doses today and has some coughing present upon taking sips and bites of food.  Family is at bedside and vu of what to watch for with further decline and RN collaborated with Natasha GRAJEDA, Lisette Beth CCP and Aline RN about pt current condition and all parties vu with plan of care.  [facility] [place] will continue to follow pt daily.        Dickson Campos, RN

## 2019-08-07 NOTE — PROGRESS NOTES
Palliative Care Sw met with patient's son to provide psychosocial support. Patient's son reported that many family members have been able to visit patient. Son reported that patient has been very comfortable and all family has been in support of keeping patient comfortable. Family feels supported and voiced appreciation of care received. Advised of availability and will continue to follow as needed.

## 2019-08-07 NOTE — PLAN OF CARE
Problem: Patient Care Overview  Goal: Plan of Care Review  Outcome: Ongoing (interventions implemented as appropriate)   08/07/19 0614   Coping/Psychosocial   Plan of Care Reviewed With patient;family   Plan of Care Review   Progress no change   OTHER   Outcome Summary Pt medicated very 4 hours with Morphine per pt and family request to maintain comfort. Morphine was increased at 0100 to 4mg after discussion with pt and granddaughter Bettie who both stated it felt like pain medication was not lasting as long and pt was in increased pain at around 4 hours post med administration. Rviewed options including giving medication dose sooner, or increasing morphine dose per orders. Pt and granddaughter both requested to increase dose. Reviewed with them both that pt had been receiving 4mg morphine on Fri and Sat as well and had tolerated well. Pt appeared more comfortable through rest of night. Addistional BM x1. Pt/family refused KPAD at this time, reheatable neck pad was brought in from home and was more comfortable per pt. Will monitor.     Goal: Individualization and Mutuality  Outcome: Ongoing (interventions implemented as appropriate)      Problem: Fall Risk (Adult)  Goal: Absence of Fall  Outcome: Ongoing (interventions implemented as appropriate)      Problem: Palliative Care (Adult)  Goal: Maximized Comfort  Outcome: Ongoing (interventions implemented as appropriate)    Goal: Enhanced Quality of Life  Outcome: Ongoing (interventions implemented as appropriate)      Problem: Skin Injury Risk (Adult)  Goal: Skin Health and Integrity  Outcome: Ongoing (interventions implemented as appropriate)

## 2019-08-07 NOTE — PLAN OF CARE
Problem: Fall Risk (Adult)  Goal: Absence of Fall  Outcome: Ongoing (interventions implemented as appropriate)      Problem: Palliative Care (Adult)  Goal: Maximized Comfort  Outcome: Ongoing (interventions implemented as appropriate)    Goal: Enhanced Quality of Life  Outcome: Ongoing (interventions implemented as appropriate)      Problem: Patient Care Overview  Goal: Plan of Care Review   08/07/19 5542   Coping/Psychosocial   Plan of Care Reviewed With patient   Plan of Care Review   Progress no change   OTHER   Outcome Summary Continued premedicating with 4mg Morphine, pt tolerated well. Pt appears comfortable throughout shift, alert enough to visit with family. Restarting Allopurinol today, family at bedside. Will monitor.     Goal: Individualization and Mutuality  Outcome: Ongoing (interventions implemented as appropriate)      Problem: Skin Injury Risk (Adult)  Goal: Skin Health and Integrity  Outcome: Ongoing (interventions implemented as appropriate)

## 2019-08-08 NOTE — PROGRESS NOTES
"Palliative Care Sw received request from KRISTAN's wife, Anastacia Ewing to follow up with her. POA and wife not at bedside so contacted by phone. POA and wife discussed frustration with family dynamics, specifically feeling that family present is preventing patient from receiving care necessary. POA upset that patient has reports of pain but other family is not wanting patient to be medicated. POA discussed that he wants patient to receive any and all medication necessary for patient to be comfortable and not in pain or feeling anxious. POA explained that other family members are having a difficult time with coping due to patient \"being in her right mind\" however patient is expressing that she is in pain but family still continues to try to stop her from receiving medication. POA stated that family members are confused thinking there there is a difference between being \"uncomfortable\" and \"in pain\". POA discussed that the other family members do not need to know everything that she is getting or dosages. POA discussed that he has tried to have discussions with his family but he is always trying to maintain some calmness and allow them to visit since his family has helped provide care for her over the years. Provided psychosocial support to POA. Updated RN, Dr. Peterson, Nursing coordinator, and Palliative Care Nurse Coordinator of concerns.   "

## 2019-08-08 NOTE — PLAN OF CARE
Problem: Fall Risk (Adult)  Goal: Absence of Fall  Outcome: Ongoing (interventions implemented as appropriate)      Problem: Palliative Care (Adult)  Goal: Maximized Comfort  Outcome: Ongoing (interventions implemented as appropriate)    Goal: Enhanced Quality of Life  Outcome: Ongoing (interventions implemented as appropriate)      Problem: Patient Care Overview  Goal: Plan of Care Review  Outcome: Ongoing (interventions implemented as appropriate)   08/08/19 0536   Coping/Psychosocial   Plan of Care Reviewed With patient   Plan of Care Review   Progress no change   OTHER   Outcome Summary Pt. VS WNL. C/o persistent hip and back pain, relieved by IV pain meds. Pt. restless throughout the majority of the night, [facility] bedside reluctant for this RN to give ativan for anxiety and restlessness. Family educated, allowed this RN to give small dose of ativan. Pt. rested much better after the ativan dose. Pt. being premedicated before turns with 4mg morphine, however family will not allow us to add ativan everytime to prevent anxiety. Turning pt. Q4h, frequent oral care, F/c care complete. Pt. A&O to self and place. Pt. still eating as tolerated, meds crushed in applesauce. Pt. resting comfortably at present, will continue to monitor closely.     Goal: Individualization and Mutuality  Outcome: Ongoing (interventions implemented as appropriate)    Goal: Discharge Needs Assessment  Outcome: Ongoing (interventions implemented as appropriate)      Problem: Skin Injury Risk (Adult)  Goal: Skin Health and Integrity  Outcome: Ongoing (interventions implemented as appropriate)

## 2019-08-08 NOTE — PROGRESS NOTES
[facility] Visit Report    Isela Ewing  [id number]  8/8/2019    Admission R/T [facility] Dx: Yes    Reason for [facility] Admission: Heart Failure    Symptom  Management: Pain    Nursing/Medication Recommendations: Monitor for condition changes and medicate as directed.    Psychosocial Issues and Recommendations:    Spiritual Concerns and Recommendations:    [facility] Discharge Plans:  None.  Pt meets GIP criteria and is unsafe for transfer with [facility] to follow pt daily.    Review of Visit Daily inpatient visit:  Pt is a pps of 20% and is minimally responsive during visit.  Pt can speak lightly when spoken to and answer questions appropriately and c/o trouble getting down phlem in back of throat.  Pt denies pain during visit and moans when turned.  RN spent time speaking with Family who is in room during visit and discussed pt decline and recommended Ativan for some relaxation due to increased dysphagia.  Christine kirkland and RN reached out to KRISTAN Martinez to make him aware of pt current condition and he vu and agrees.  RN spoke with Lisette and Aline GRAJEDA about pt current condition and they both vu.  hospitals will continue to monitor daily.        Dickson Campos RN

## 2019-08-08 NOTE — PLAN OF CARE
Problem: Fall Risk (Adult)  Goal: Absence of Fall  Outcome: Ongoing (interventions implemented as appropriate)      Problem: Palliative Care (Adult)  Goal: Maximized Comfort  Outcome: Ongoing (interventions implemented as appropriate)    Goal: Enhanced Quality of Life  Outcome: Ongoing (interventions implemented as appropriate)      Problem: Patient Care Overview  Goal: Plan of Care Review   08/08/19 8086   Coping/Psychosocial   Plan of Care Reviewed With patient;family   Plan of Care Review   Progress declining   OTHER   Outcome Summary Pt having bad dreams, family agreeable to giving Ativan. Pt repeatedly requesting to be repositioned, increased morphine for comfort. Premed for turns, family at bedside and comfort/goals of care discussed. Family not in agreement of medicating, educated family on symptom management and family verbalizes understanding. Pt denies any needs, will monitor for comfort.        Problem: Skin Injury Risk (Adult)  Goal: Skin Health and Integrity  Outcome: Ongoing (interventions implemented as appropriate)

## 2019-08-08 NOTE — PROGRESS NOTES
Palliative Care/Hospice Follow Up Note       LOS: 4 days   Patient Care Team:  Rebecca Brower MD as PCP - General (Internal Medicine)  Kimani Peterson MD as Consulting Physician (Hospice and Palliative Medicine)    Chief Complaint:  CHF    Interval History:     Patient Complaints: None; granddaughters report pain in distal MTs right foot and patient complains left thumb. Family concerned about gout? No joints swollen or red  Patient Denies:  No significant SOA  History taken from:  Granddaughters and RN    Review of Systems:  As above    Palliative Performance Scale  Palliative Performance Scale Score: 30%  [place] Symptom Assessment System Revised  Pain Score: 4   ESAS Tiredness Score: 7  ESAS Nausea Score: No nausea  ESAS Depression Score: No depression  ESAS Anxiety Score: No anxiety  ESAS Drowsiness Score: 5  ESAS Lack of Appetite Score: 6  ESAS Wellbeing Score: 5  ESAS Dyspnea Score: 1  ESAS Other Problem Score: 5(constipation)  ESAS Source of Information: healthcare professional caregiver, patient  ESAS Intervention: medicated/see MAR  ESAS Intervention Response: tolerated    Vital Signs  Temp:  [97 °F (36.1 °C)-97.3 °F (36.3 °C)] 97.3 °F (36.3 °C)  Heart Rate:  [83-89] 89  Resp:  [18] 18  BP: (156-161)/(65-72) 156/65  Device (Oxygen Therapy): nasal cannulaFlow (L/min):  [2] 2SpO2:  [95 %] 95 %    Physical Exam:  General Appearance:   Awake and appears in no acute distress, weak appearing   Throat:   No oral lesions, oral mucosa moist   Neck:   No adenopathy, supple, trachea midline   Lungs:     Clear to auscultation, occasional crackle, diminished, respirations regular    Heart:    Regular rhythm and normal rate, + murmur   Abdomen:     Occasional bowel sounds, soft and non-tender, non-distended   Extremities:  Right greater than left lower extremity edema, SCDs on, no cyanosis   Pulses:   Radial pulses palpable and equal bilaterally          Results Review:     I reviewed the patient's new clinical  results.    Medication Reviewed.    Assessment/Plan       Chronic left-sided congestive heart failure (CMS/HCC)    Hospice care    Aortic stenosis; moderate-severe    Non-rheumatic mitral regurgitation; moderate-severe    Pulmonary hypertension (CMS/HCC)    Closed fracture of right hip requiring operative repair with routine healing; 7/26/2019    Postoperative anemia due to acute blood loss    Oropharyngeal dysphagia    HTN (hypertension)    I reviewed all with the patient's granddaughters.  I also reviewed with the patient's RN in the room.  Presently, the patient appears more awake and comfortable.  She has a neck pillow which she states helps her neck.  The patient's granddaughters are worried about gout.  They state the patient is complaining about her right distal foot.  The patient complains some of her left thumb.  No joints are swollen or red.  They wish allopurinol to be restarted.  I agreed to this.     [facility] NICCI Forman's note reviewed.       Patient has received 5 doses of 4 mg morphine (5 doses of 2 mg morphine yesterday) thus far today.  Patient has not required any Ativan.    Continue the same treatment at present.    Plan for disposition: DAMARIS Peterson MD  Hospice and Palliative Medicine  08/07/19  8:52 PM

## 2019-08-09 NOTE — PROGRESS NOTES
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    Ok to remove staples at this point as 2 weeks out from surgery.    Follow up radiographs unnecessary in the setting of palliative care.    Please call if any new concerns arise that I may be able to assist with.    Jerome Forbes MD  Orthopaedic Surgeon    [facility]  [phone]

## 2019-08-09 NOTE — PLAN OF CARE
"I started to have pain to my stomach this morning and then nausea and vomiting and I have not been able to hold anything down". Problem: Fall Risk (Adult)  Goal: Absence of Fall  Outcome: Ongoing (interventions implemented as appropriate)      Problem: Palliative Care (Adult)  Goal: Maximized Comfort  Outcome: Ongoing (interventions implemented as appropriate)    Goal: Enhanced Quality of Life  Outcome: Ongoing (interventions implemented as appropriate)      Problem: Patient Care Overview  Goal: Plan of Care Review  Outcome: Ongoing (interventions implemented as appropriate)   08/09/19 1742   Coping/Psychosocial   Plan of Care Reviewed With patient;family   Plan of Care Review   Progress declining   OTHER   Outcome Summary Pt premedicated with 6mg morphine prior to activity, mostly resting comfortably between care. RN and family noticed that patient is having more trouble finding words and connecting thoughts today, but appears calm. Staples removed from r hip incision and dressing placed per order. Family at bedside throughout the day. Continue comfort care.      Goal: Individualization and Mutuality  Outcome: Ongoing (interventions implemented as appropriate)   08/07/19 0614 08/09/19 1742   Individualization   Patient Specific Goals (Include Timeframe) comfort --    Patient Specific Interventions --  premedicate for turns and PRN for symptom management   Mutuality/Individual Preferences   How Would You and/or Your Support Person Like to Participate in Your Care? communication with pt and family --    Mutuality/Individual Preferences   How to Address Anxieties/Fears continued education --        Problem: Skin Injury Risk (Adult)  Goal: Skin Health and Integrity  Outcome: Ongoing (interventions implemented as appropriate)

## 2019-08-09 NOTE — PROGRESS NOTES
Palliative Care/Hospice Follow Up Note       LOS: 5 days   Patient Care Team:  Rebecca Brower MD as PCP - General (Internal Medicine)  Kimani Peterson MD as Consulting Physician (Hospice and Palliative Medicine)    Chief Complaint:  CHF    Interval History:     Patient Complaints: Pain across her back at the level of her hips  Patient Denies:  No SOA  History taken from: Daughter-in-law, POA's wife and RN    Review of Systems:  As above    Palliative Performance Scale  Palliative Performance Scale Score: 30%  [place] Symptom Assessment System Revised  Pain Score: 6   ESAS Tiredness Score: 4  ESAS Nausea Score: No nausea  ESAS Depression Score: No depression  ESAS Anxiety Score: 2  ESAS Drowsiness Score: 4  ESAS Lack of Appetite Score: 4  ESAS Wellbeing Score: 5  ESAS Dyspnea Score: 2  ESAS Other Problem Score: 5(constipation)  ESAS Source of Information: healthcare professional caregiver  ESAS Intervention: medicated/see MAR  ESAS Intervention Response: tolerated    Vital Signs  Temp:  [98.1 °F (36.7 °C)] 98.1 °F (36.7 °C)  Heart Rate:  [82-83] 82  Resp:  [18] 18  BP: (130-166)/(71-72) 130/72  Device (Oxygen Therapy): nasal cannulaFlow (L/min):  [2] 2SpO2:  [86 %-92 %] 92 %    Physical Exam:  General Appearance:   Awake and appears in no acute distress, weak appearing   Throat:   No oral lesions, oral mucosa moist   Neck:   No adenopathy, supple, trachea midline   Lungs:     Clear to auscultation, occasional crackle, diminished, respirations regular    Heart:    Regular rhythm and normal rate, + murmur   Abdomen:     Occasional bowel sounds, soft and non-tender, non-distended   Extremities:  Right greater than left lower extremity edema, SCDs on, no cyanosis   Pulses:   Radial pulses palpable and equal bilaterally          Results Review:     I reviewed the patient's new clinical results.    Medication Reviewed.    Assessment/Plan       Chronic left-sided congestive heart failure (CMS/HCC)    Hospice care     Aortic stenosis; moderate-severe    Non-rheumatic mitral regurgitation; moderate-severe    Pulmonary hypertension (CMS/HCC)    Closed fracture of right hip requiring operative repair with routine healing; 7/26/2019    Postoperative anemia due to acute blood loss    Oropharyngeal dysphagia    HTN (hypertension)    I reviewed with the patient's daughter-in-law.  I also reviewed with the patient's RN.  Presently, the patient appears comfortable except for pain across her back at the level of her hips.  She has a neck pillow which she states helps her neck.       [facility] RN Dickson's note reviewed.       Patient has received 5 doses of 4 mg morphine (6 doses of 4 mg morphine yesterday) thus far today.  Patient has required 1 dose of 0.5 mg Ativan thus far today.      Continue the same treatment at present.    Plan for disposition: DAMARIS Peterson MD  Hospice and Palliative Medicine  08/08/19  9:02 PM

## 2019-08-09 NOTE — PLAN OF CARE
Problem: Fall Risk (Adult)  Goal: Absence of Fall  Outcome: Ongoing (interventions implemented as appropriate)      Problem: Palliative Care (Adult)  Goal: Maximized Comfort  Outcome: Ongoing (interventions implemented as appropriate)    Goal: Enhanced Quality of Life  Outcome: Ongoing (interventions implemented as appropriate)      Problem: Patient Care Overview  Goal: Plan of Care Review  Outcome: Ongoing (interventions implemented as appropriate)   08/09/19 0641   Coping/Psychosocial   Plan of Care Reviewed With patient   Plan of Care Review   Progress declining   OTHER   Outcome Summary Pt. VS WNL. Pt. being premedicated for turns with 6mg morphine. This seems to be keeping the pt. comfortable. Pt. has slept throughout the night comfortably denies anymore bad dreams, has not required any ativan at present. Pt. mostly drowsy, however can answer some questions. Pt. with FC cath care complete. Frequent oral care, turning pt. Q4h. Pt. resting comfortably at present, will continue to monitor closely.     Goal: Individualization and Mutuality  Outcome: Ongoing (interventions implemented as appropriate)    Goal: Discharge Needs Assessment  Outcome: Ongoing (interventions implemented as appropriate)      Problem: Skin Injury Risk (Adult)  Goal: Skin Health and Integrity  Outcome: Ongoing (interventions implemented as appropriate)

## 2019-08-09 NOTE — PROGRESS NOTES
[facility] Visit Report    Isela Ewing  [id number]  8/9/2019    Admission R/T [facility] Dx: Yes    Reason for [facility] Admission: Heart Failure    Symptom  Management: Pain    Nursing/Medication Recommendations:Monitor for condition changes and medicate as directed.    Psychosocial Issues and Recommendations:    Spiritual Concerns and Recommendations:    [facility] Discharge Plans:  None.  Pt meets GIP criteria and is unsafe for transfer with [facility] to follow pt daily.    Review of Visit Daily inpatient visit:  Pt is minimally responsive and is a pps of 20% and appears to be very lethargic with dark tea colored diminished UOP present in bsd bag.  Pt continues to require Morphine 6 mg IV x 4 prn doses to be comfortable and RN met with family who is present and at bedside and discussed pt getting worn out trying to communicate with family and recommended IV Ativan with all parties and POA vu.  RN collaborated with Natasha GRAJEDA, Dr. Peterson, Lisette CHINCHILLA and Kim GRAJEDA who all vu and poc.  Pt continues to be unsafe for transfer with [facility] to follow pt daily.        Dickson Campos, RN

## 2019-08-10 NOTE — PLAN OF CARE
"Problem: Patient Care Overview  Goal: Plan of Care Review  Outcome: Ongoing (interventions implemented as appropriate)   08/10/19 4695   Coping/Psychosocial   Plan of Care Reviewed With patient;family   Plan of Care Review   Progress declining   OTHER   Outcome Summary Pt was alert, but confused this day shift. This AM RN spoke with KRISTAN Martinez who wants his mother (the patient) comfortable and for the nursing staff to do what they \"need to do to keep her comfortable\". The two granddaughters that have not been on board previously with medicating the pt and keeping her comfortable, did not disagree with nursing today when RN medicated Q4 with turns. Pt expressed pain with repositioning due to RLE fx. RN edcuated granddaughters and other family members continously expressing the need to medicate prior to turns to keep the patient comfortable. RN also educated family to not feed the patient unless she was alert enough to swallow, which as the day progressed she has not been. Pt expressed to nursing staff that \"she wanted to go\" and was ready to die. Pt is receiving 1mg of dilaudid due to RN rescue medicating for pain prior to turns and she continues to receive 0.5mg of ativan. Pts family refused turns at 5PM - RN educated family regarding need to turn. Pt is currently resting with family at bedside. Will continue to monitor.      Goal: Individualization and Mutuality  Outcome: Ongoing (interventions implemented as appropriate)      Problem: Fall Risk (Adult)  Goal: Absence of Fall  Outcome: Ongoing (interventions implemented as appropriate)      Problem: Palliative Care (Adult)  Goal: Maximized Comfort  Outcome: Ongoing (interventions implemented as appropriate)    Goal: Enhanced Quality of Life  Outcome: Ongoing (interventions implemented as appropriate)      Problem: Skin Injury Risk (Adult)  Goal: Skin Health and Integrity  Outcome: Ongoing (interventions implemented as appropriate)        "

## 2019-08-10 NOTE — PROGRESS NOTES
[facility] Follow Up Note       LOS: 7 days   Patient Care Team:  Rebecca Brower MD as PCP - General (Internal Medicine)  Kimani Peterson MD as Consulting Physician (Hospice and Palliative Medicine)    Chief Complaint:  CHF    Interval History:     Patient Complaints: Comfortable in bed   Patient Denies:  No SOA  History taken from: Granddaughter and patient and RN    Review of Systems:  As above    Palliative Performance Scale  Palliative Performance Scale Score: 30%  [place] Symptom Assessment System Revised  Pain Score: 6   ESAS Tiredness Score: 6  ESAS Nausea Score: No nausea  ESAS Depression Score: unable to assess  ESAS Anxiety Score: 3  ESAS Drowsiness Score: 9  ESAS Lack of Appetite Score: 8  ESAS Wellbeing Score: unable to assess  ESAS Dyspnea Score: 2  ESAS Other Problem Score: unable to assess  ESAS Source of Information: family caregiver, healthcare professional caregiver, patient  ESAS Intervention: medicated/see MAR  ESAS Intervention Response: tolerated    Vital Signs  Heart Rate:  [102] 102  Resp:  [18] 18  BP: (174)/(75) 174/75  Device (Oxygen Therapy): humidified;nasal cannulaFlow (L/min):  [2] 2SpO2:  [96 %] 96 %    Physical Exam:  General Appearance:   Awake but groggy and appears in no acute distress, weak appearing   Throat:   No oral lesions, oral mucosa moist   Neck:   No adenopathy, supple, trachea midline   Lungs:     Clear to auscultation, occasional crackle, diminished, respirations regular    Heart:    Regular rhythm and tachycardia, + murmur   Abdomen:     Occasional bowel sounds, soft and non-tender, non-distended   Extremities:  Right greater than left lower extremity edema, no cyanosis   Pulses:   Radial pulses palpable and equal bilaterally          Results Review:     I reviewed the patient's new clinical results.    Medication Reviewed.    Assessment/Plan       Chronic left-sided congestive heart failure (CMS/HCC)    Hospice care    Aortic stenosis;  moderate-severe    Non-rheumatic mitral regurgitation; moderate-severe    Pulmonary hypertension (CMS/HCC)    Closed fracture of right hip requiring operative repair with routine healing; 7/26/2019    Postoperative anemia due to acute blood loss    Oropharyngeal dysphagia    HTN (hypertension)    I reviewed with the patient's granddaughter at bedside.  I also reviewed with the patient's RN.  Presently, the patient appears comfortable in bed.  She has a neck pillow which she states helps her neck.      [facility] RN Shannon's note reviewed.       Patient has received 3 doses of 6 mg morphine (6 doses yesterday) thus far today.  Patient has received 2 doses of 0.5 mg Ativan thus far today.  Continue the same treatment at present for symptom management.    Staples following hip surgery removed yesterday.      The patient has complaints about no bowel movement.  Suppository given last night.  If needed, the patient may have a fleets enema today.    Plan for disposition: DAMIAN Peterson MD  Hospice and Palliative Medicine  08/10/19  4:41 PM

## 2019-08-10 NOTE — PROGRESS NOTES
.[facility]alliative [facility]osp[facility] Follow Up Note       LOS: 6 days   Patient Care Team:  Rebecca Brower MD as PCP - General (Internal Medicine)  Kimani Peterson MD as Consulting Physician (Hospice and Palliative Medicine)    Chief Complaint:  CHF    Interval History:     Patient Complaints: Comfortable in bed   Patient Denies:  No SOA  History taken from: Daughter and patient and RN    Review of Systems:  As above    Palliative Performance Scale  Palliative Performance Scale Score: 30%  [place] Symptom Assessment System Revised  Pain Score: 2   ESAS Tiredness Score: 6  ESAS Nausea Score: No nausea  ESAS Depression Score: unable to assess  ESAS Anxiety Score: No anxiety  ESAS Drowsiness Score: 5  ESAS Lack of Appetite Score: 8  ESAS Wellbeing Score: 2  ESAS Dyspnea Score: No shortness of breath  ESAS Other Problem Score: unable to assess  ESAS Source of Information: patient, healthcare professional caregiver  ESAS Intervention: medicated/see MAR  ESAS Intervention Response: tolerated    Vital Signs  Temp:  [96.8 °F (36 °C)] 96.8 °F (36 °C)  Heart Rate:  [101] 101  Resp:  [20] 20  BP: (135)/(64) 135/64  Device (Oxygen Therapy): nasal cannulaFlow (L/min):  [3] 3SpO2:  [99 %] 99 %    Physical Exam:  General Appearance:   Awake and appears in no acute distress, weak appearing   Throat:   No oral lesions, oral mucosa moist with ice chips   Neck:   No adenopathy, supple, trachea midline   Lungs:     Clear to auscultation, occasional crackle, diminished, respirations regular    Heart:    Regular rhythm and normal rate, + murmur   Abdomen:     Occasional bowel sounds, soft and non-tender, non-distended   Extremities:  Right greater than left lower extremity edema, SCDs on, no cyanosis   Pulses:   Radial pulses palpable and equal bilaterally          Results Review:     I reviewed the patient's new clinical results.    Medication Reviewed.    Assessment/Plan       Chronic left-sided congestive heart failure (CMS/HCC)     Hospice care    Aortic stenosis; moderate-severe    Non-rheumatic mitral regurgitation; moderate-severe    Pulmonary hypertension (CMS/HCC)    Closed fracture of right hip requiring operative repair with routine healing; 7/26/2019    Postoperative anemia due to acute blood loss    Oropharyngeal dysphagia    HTN (hypertension)    I reviewed with the patient's daughter at bedside.  I also reviewed with the patient's RN.  Presently, the patient which she is uncomfortable in bed.  She has a neck pillow which she states helps her neck.  I help the granddaughter reposition her for comfort.    [facility] NICCI Forman's note reviewed.       Patient has received 5 doses of 6 mg morphine (7 doses yesterday) thus far today.  Patient has received no doses of Ativan thus far today.  She received 2 doses of 0.5 mg Ativan yesterday. Continue the same treatment at present.    I reviewed the orthopedic note.  Staples will be removed tomorrow.    Noontime, I did discuss the patient's case with the patient's son KRISTAN Martinez, and son Misael.  Natasha GRAJEDA was present with representation from patient satisfaction.    Plan for disposition: DAMARIS Peterson MD  Hospice and Palliative Medicine  08/09/19  8:12 PM

## 2019-08-10 NOTE — PLAN OF CARE
Problem: Fall Risk (Adult)  Goal: Absence of Fall  Outcome: Ongoing (interventions implemented as appropriate)      Problem: Palliative Care (Adult)  Goal: Maximized Comfort  Outcome: Ongoing (interventions implemented as appropriate)    Goal: Enhanced Quality of Life  Outcome: Ongoing (interventions implemented as appropriate)      Problem: Patient Care Overview  Goal: Plan of Care Review  Outcome: Ongoing (interventions implemented as appropriate)   08/10/19 0519   Coping/Psychosocial   Plan of Care Reviewed With patient   Plan of Care Review   Progress declining   OTHER   Outcome Summary Pt. premedicated with 6mg morphine, intermittent Ativan PRN. Pt. c/o constipation, suppository given, small amount of stool out. Pt. A&O x2-3. Pt. more restless last night Ativan given once. Pt. with FC. Cath care complete. Turning pt. Q4h, frequent oral care. Pt. resting comfortably at present, will continue to monitor closely.     Goal: Individualization and Mutuality  Outcome: Ongoing (interventions implemented as appropriate)    Goal: Discharge Needs Assessment  Outcome: Ongoing (interventions implemented as appropriate)      Problem: Skin Injury Risk (Adult)  Goal: Skin Health and Integrity  Outcome: Ongoing (interventions implemented as appropriate)

## 2019-08-10 NOTE — PROGRESS NOTES
[facility] Visit Report    Isela Ewing  [id number]  8/10/2019    Admission R/T [facility] Dx: YES      Reason for [facility] Admission: HF with hypertensive heart disease, right femur fracture      Symptom  Management: Pain control and soa      Nursing/Medication Recommendations: Please contact [facility] 24/7 for any questions or concerns.      Psychosocial Issues and Recommendations: Provide support to patient and family      Spiritual Concerns and Recommendations: None at present      [facility]  None at present, continue to monitor for decline. Daily RN assessment required for symptom management of pain and soa using IV medications. Patient is meeting criteria for GIP as a [facility] patient.      Review of Visit: Close monitoring for safety, daily RN assessment required for symptom management of pain and soa, comfort care for declining patient. Patient lying in bed, asleep/sedated, color pale, breathing shallow with 02 sat 96% on 2L. -R 18-BPa74/75. F/C in place with small amount of yellow urine noted. Right hip incision intact. Spoke to son Misael at bedside regarding declining status and he is aware and accepting. Emotional support provided. Discussed care needs with staff NICCI Rushing and patient has received IV Morphine 6mg x 3 doses and IV Ativan 0.5mg x 2 doses since midnight. Will continue to see daily to assess needs, monitor status and provide support.        Shannon Rodriguez RN  [facility] Scattered bed nurse

## 2019-08-11 NOTE — PROGRESS NOTES
[facility] Visit Report    Isela Ewing  [id number]  8/11/2019    Admission R/T [facility] Dx: YES      Reason for [facility] Admission: HF with hypertensive HD      Symptom  Management: Pain control and soa      Nursing/Medication Recommendations: Please contact [facility] at [phone] for any questions or concerns and at time of death.      Psychosocial Issues and Recommendations: Provide support to patient and family      Spiritual Concerns and Recommendations: None at present      [facility] Discharge Plans:  None, continue to monitor for steady and rapid decline and not safe for transport. Daily RN assessment required for symptom management of pain and soa using IV medications. Patient is meeting criteria for GIP as a [facility] scattered bed patient.      Review of Visit: Close monitoring for safety, daily RN assessment required for symptom management of pain and soa, comfort care for steady and rapid decline. Patient lying in bed, unresponsive, eyes open slightly but does not seem aware. Color pale, breathing shallow with 02 sat 88% on2L. VS 98.5-007-/60. Right hip incision intact. F/C in place with small amount of urine noted. Emotional support provided to granddagretta at bedside and spoke to son Juan who is POA on the phone and gave him update and also support. Discussed care needs with staff RN and patient has received IV Dilaudid 1mg x 3 doses, IV Ativan 0.5mg x 3 doses and IV Robinul 0.4mg x 3 doses and appears comfortable. Will continue to see daily to assess needs, monitor status and offer support.        Shannon Rodriguez RN  [facility] Scattered bed nurse

## 2019-08-11 NOTE — PLAN OF CARE
"Problem: Fall Risk (Adult)  Goal: Absence of Fall  Outcome: Ongoing (interventions implemented as appropriate)      Problem: Palliative Care (Adult)  Goal: Maximized Comfort  Outcome: Ongoing (interventions implemented as appropriate)    Goal: Enhanced Quality of Life  Outcome: Ongoing (interventions implemented as appropriate)      Problem: Patient Care Overview  Goal: Plan of Care Review  Outcome: Ongoing (interventions implemented as appropriate)   08/11/19 0540   Coping/Psychosocial   Plan of Care Reviewed With patient   Plan of Care Review   Progress declining   OTHER   Outcome Summary Pt has been responsive only with repositioning tonight. Currently receiving dilaudid 1 mg, ativan 0.5 mgs and now robinul 0.4mgs was added this shift for secretions. Granddaughters at bedside and remain \"suspicious\" of care, even turning her once when staff didn't do it \"fast enough\"-this was after they had refused 1300 turn. Education given regarding the use of robinul and they were reassured this would not cause further sedation. Oral and vamsi care completed with repositioning. Will continue to monitor and offer comfort and support per palliative POC     Goal: Individualization and Mutuality  Outcome: Ongoing (interventions implemented as appropriate)      Problem: Skin Injury Risk (Adult)  Goal: Skin Health and Integrity  Outcome: Ongoing (interventions implemented as appropriate)        "

## 2019-08-11 NOTE — PROGRESS NOTES
.[facility] Follow Up Note       LOS: 8 days   Patient Care Team:  Rebecca Brower MD as PCP - General (Internal Medicine)  Kimani Peterson MD as Consulting Physician (Hospice and Palliative Medicine)    Chief Complaint:  CHF    Interval History:     Patient Complaints: None   Patient Denies:  None  History taken from: Granddaughter and RN    Review of Systems:  As above    Palliative Performance Scale  Palliative Performance Scale Score: 20%  [place] Symptom Assessment System Revised  Pain Score: 7   ESAS Tiredness Score: 8  ESAS Nausea Score: No nausea  ESAS Depression Score: unable to assess  ESAS Anxiety Score: 8  ESAS Drowsiness Score: 8  ESAS Lack of Appetite Score: Worst lack of appetite  ESAS Wellbeing Score: unable to assess  ESAS Dyspnea Score: 3  ESAS Other Problem Score: unable to assess  ESAS Source of Information: healthcare professional caregiver, family caregiver  ESAS Intervention: medicated/see MAR  ESAS Intervention Response: tolerated    Vital Signs  Temp:  [98.6 °F (37 °C)] 98.6 °F (37 °C)  Heart Rate:  [106] 106  Resp:  [16] 16  BP: (129)/(60) 129/60  Device (Oxygen Therapy): humidified;nasal cannulaFlow (L/min):  [2] 2SpO2:  [88 %] 88 %    Physical Exam:  General Appearance:   Not awake and appears in no acute distress, eyes open but not tracking    Throat:   No oral lesions, oral mucosa somewhat moist   Neck:   No adenopathy, supple, trachea midline   Lungs:     Clear to auscultation, occasional crackle, diminished, respirations regular    Heart:    Regular rhythm and tachycardia, + murmur   Abdomen:     Occasional bowel sounds, soft and non-tender, non-distended   Extremities:  Right greater than left lower extremity edema, no cyanosis   Pulses:   Radial pulses palpable and equal bilaterally          Results Review:     I reviewed the patient's new clinical results.    Medication Reviewed.    Assessment/Plan       Chronic left-sided congestive heart failure (CMS/HCC)     Hospice care    Aortic stenosis; moderate-severe    Non-rheumatic mitral regurgitation; moderate-severe    Pulmonary hypertension (CMS/HCC)    Closed fracture of right hip requiring operative repair with routine healing; 7/26/2019    Postoperative anemia due to acute blood loss    Oropharyngeal dysphagia    HTN (hypertension)    I reviewed with the patient's granddaughter at bedside.  I also reviewed with the patient's RN.  Presently, the patient appears comfortable in bed.  She has a neck pillow which she states helps her neck.  She is not awake to vocalize.  Occasional intermittent expiratory moan present.  I do not feel like this is pain but more physiologic due to her weakened condition.    The patient has not been able to take her p.o. medications.  The patient has required glycopyrrolate for airway congestion.  The patient has received 3 doses of 1 mg Dilaudid and 3 doses of 0.5 mg Ativan thus far today.  Continue the same treatment at present for symptom management.    The patient is demonstrating decline.    Plan for disposition: DAMIAN Peterson MD  Hospice and Palliative Medicine  08/11/19  11:38 AM

## 2019-08-11 NOTE — PLAN OF CARE
Problem: Patient Care Overview  Goal: Plan of Care Review  Outcome: Ongoing (interventions implemented as appropriate)   08/11/19 3220   Coping/Psychosocial   Plan of Care Reviewed With patient;family   Plan of Care Review   Progress declining   OTHER   Outcome Summary Patient responsive to pain only this shift. Dilaudid, ativan, and robinul given prior to turns. Pt has cont. expiratory moan and periods of apnea. Pt currently resting with family at bedside.RN educated family to let the patient rest at times and offer a destimulating environment as overstimulation (noise, lights, and constant touch) can heighten pts anxiety and cause discomfort. Family agreed. Will continue to monitor.      Goal: Individualization and Mutuality  Outcome: Ongoing (interventions implemented as appropriate)      Problem: Fall Risk (Adult)  Goal: Absence of Fall  Outcome: Ongoing (interventions implemented as appropriate)      Problem: Palliative Care (Adult)  Goal: Maximized Comfort  Outcome: Ongoing (interventions implemented as appropriate)    Goal: Enhanced Quality of Life  Outcome: Ongoing (interventions implemented as appropriate)      Problem: Skin Injury Risk (Adult)  Goal: Skin Health and Integrity  Outcome: Ongoing (interventions implemented as appropriate)

## 2019-08-12 NOTE — PLAN OF CARE
Problem: Fall Risk (Adult)  Goal: Absence of Fall  Outcome: Ongoing (interventions implemented as appropriate)      Problem: Palliative Care (Adult)  Goal: Maximized Comfort  Outcome: Ongoing (interventions implemented as appropriate)    Goal: Enhanced Quality of Life  Outcome: Ongoing (interventions implemented as appropriate)      Problem: Patient Care Overview  Goal: Plan of Care Review  Outcome: Ongoing (interventions implemented as appropriate)   08/12/19 0793   Coping/Psychosocial   Plan of Care Reviewed With patient;family   Plan of Care Review   Progress declining   OTHER   Outcome Summary Maintained comfort measures per palliative care protocol. Patient is premedicated prior to turns. Granddaughters at bedside. Patient is turned on her left and back only. Will continue to monitor vital signs and comfort.     Goal: Individualization and Mutuality  Outcome: Ongoing (interventions implemented as appropriate)    Goal: Discharge Needs Assessment  Outcome: Ongoing (interventions implemented as appropriate)    Goal: Interprofessional Rounds/Family Conf  Outcome: Ongoing (interventions implemented as appropriate)      Problem: Skin Injury Risk (Adult)  Goal: Skin Health and Integrity  Outcome: Ongoing (interventions implemented as appropriate)

## 2019-08-12 NOTE — NURSING NOTE
[facility] Visit Report    Isela Ewing  [id number]  8/12/2019    Admission R/T [facility] Dx:yes    Reason for [facility] Admission:hypertensive heart disease with heart failure    Symptom  Management: pain, restlessness and congestion    Nursing/Medication Recommendations:nothing at this time    Psychosocial Issues and Recommendations:    Spiritual Concerns and Recommendations:    [facility] Discharge Plans:  Nothing at this time    Review of Visit (Include All Collaboration- including names of hospital and family involved during admission/visit):RN received report from [facility](ROMEO GRAJEDA). RN arrived at bedside. Family is present at the time of visit.  Patient is laying in bed with her eyes half open along with her mouth open. Patient is unresponsive to touch or voice during visit. Patient is actively dying. RN educated family on patient's current condition and family states understanding. Family believes patient is comfortable at the time of the visit. Patient appears comfortable but respiratory rate is between 28 and 32. RN explained to the family that the patient could be having some distressing symptoms and would recommended PRN medications to help relax and provide comfort for the patient. Family agreed to PRN medications and RN notified [facility] of patient's increased respiratory rate. No needs or concerns stated by family at this time. Team will continue to visit daily to assess patient's condition and comfort along with providing support to the patient and family.         Hardeep Tellez RN

## 2019-08-12 NOTE — PLAN OF CARE
Problem: Patient Care Overview  Goal: Plan of Care Review  Outcome: Ongoing (interventions implemented as appropriate)    Goal: Individualization and Mutuality  Outcome: Ongoing (interventions implemented as appropriate)   08/07/19 0614 08/09/19 1742 08/11/19 0540   Individualization   Patient Specific Preferences --  --  medicate prior to turns with dilaudid, ativan and robinul   Patient Specific Goals (Include Timeframe) comfort --  --    Patient Specific Interventions --  premedicate for turns and PRN for symptom management --    Mutuality/Individual Preferences   What Anxieties, Fears, Concerns, or Questions Do You Have About Your Care? --  --  --    What Information Would Help Us Give You More Personalized Care? none reported --  --    How Would You and/or Your Support Person Like to Participate in Your Care? communication with pt and family --  --    Mutuality/Individual Preferences   How to Address Anxieties/Fears --  --  --     08/12/19 1812   Individualization   Patient Specific Preferences --    Patient Specific Goals (Include Timeframe) --    Patient Specific Interventions --    Mutuality/Individual Preferences   What Anxieties, Fears, Concerns, or Questions Do You Have About Your Care? LUCERO   What Information Would Help Us Give You More Personalized Care? --    How Would You and/or Your Support Person Like to Participate in Your Care? --    Mutuality/Individual Preferences   How to Address Anxieties/Fears LUCERO       Problem: Fall Risk (Adult)  Goal: Absence of Fall  Outcome: Ongoing (interventions implemented as appropriate)      Problem: Palliative Care (Adult)  Goal: Maximized Comfort  Outcome: Ongoing (interventions implemented as appropriate)    Goal: Enhanced Quality of Life  Outcome: Ongoing (interventions implemented as appropriate)      Problem: Skin Injury Risk (Adult)  Goal: Skin Health and Integrity  Outcome: Ongoing (interventions implemented as appropriate)

## 2019-08-13 NOTE — PROGRESS NOTES
Case Management Discharge Note    Final Note: The patient  on 19 @ 15:39. BEstefani Beht RN, CCP.     Destination - Selection Complete      Service Provider Request Status Selected Services Address Phone Number Fax Number    [facility] [address] [facility] [place] [address] [phone]      Durable Medical Equipment      No service has been selected for the patient.      Dialysis/Infusion      No service has been selected for the patient.      Home Medical Care      No service has been selected for the patient.      Therapy      No service has been selected for the patient.      Community Resources      No service has been selected for the patient.             Final Discharge Disposition Code: 41 -  in medical facility

## 2019-08-13 NOTE — PROGRESS NOTES
.kcPalliative Care/Hospice Follow Up Note       LOS: 9 days   Patient Care Team:  Rebecca Brower MD as PCP - General (Internal Medicine)  Kimani Peterson MD as Consulting Physician (Hospice and Palliative Medicine)    Chief Complaint:  CHF    Interval History:     Patient Complaints: None   Patient Denies:  None  History taken from: Granddaughter and RN    Review of Systems:  As above    Palliative Performance Scale  Palliative Performance Scale Score: 10%  [place] Symptom Assessment System Revised  Pain Score: no pain   ESAS Tiredness Score: Worst possible tiredness  ESAS Nausea Score: No nausea  ESAS Depression Score: unable to assess  ESAS Anxiety Score: No anxiety  ESAS Drowsiness Score: Worst possible drowsiness  ESAS Lack of Appetite Score: Worst lack of appetite  ESAS Wellbeing Score: unable to assess  ESAS Dyspnea Score: No shortness of breath  ESAS Other Problem Score: unable to assess  ESAS Source of Information: healthcare professional caregiver  ESAS Intervention: medicated/see MAR  ESAS Intervention Response: tolerated    Vital Signs  Temp:  [101.4 °F (38.6 °C)-104 °F (40 °C)] 104 °F (40 °C)  Heart Rate:  [114-120] 120  Resp:  [14-24] 14  BP: (118-137)/(52-67) 118/52  Device (Oxygen Therapy): nasal cannulaFlow (L/min):  [2] 2SpO2:  [90 %-97 %] 97 %    Physical Exam:  General Appearance:   Not awake and minimal arousal noted and appears in no acute distress, eyes open but not tracking    Throat:   No oral lesions, oral mucosa somewhat moist   Neck:   No adenopathy, supple, trachea midline   Lungs:     Clear to auscultation, slight increased crackles and slightly coarse, diminished, respirations regular    Heart:    Regular rhythm and tachycardia, + murmur   Abdomen:     Occasional bowel sounds, soft and non-tender, non-distended   Extremities:  No significant pretibial edema, no cyanosis   Pulses:   Radial pulses palpable and equal bilaterally          Results Review:     I reviewed the  patient's new clinical results.    Medication Reviewed.    Assessment/Plan       Chronic left-sided congestive heart failure (CMS/HCC)    Hospice care    Aortic stenosis; moderate-severe    Non-rheumatic mitral regurgitation; moderate-severe    Pulmonary hypertension (CMS/HCC)    Closed fracture of right hip requiring operative repair with routine healing; 7/26/2019    Postoperative anemia due to acute blood loss    Oropharyngeal dysphagia    HTN (hypertension)    I reviewed with the patient's granddaughter at bedside.  I also reviewed with the patient's RN.  Presently, the patient appears comfortable in bed.  The patient minimally arouses is not awake.  She has her neck pillow.  She is not awake to vocalize.      The patient has not been able to take her p.o. medications.  The patient has required glycopyrrolate for airway congestion.  The patient has received 3 doses of 1 mg Dilaudid and 3 doses of 0.5 mg Ativan thus far today.  Continue the same treatment at present for symptom management.    The patient is demonstrating decline.    Plan for disposition: DAMIAN Peterson MD  Hospice and Palliative Medicine  08/12/19  10:06 PM

## 2019-08-13 NOTE — PLAN OF CARE
Problem: Fall Risk (Adult)  Goal: Absence of Fall  Outcome: Ongoing (interventions implemented as appropriate)      Problem: Palliative Care (Adult)  Goal: Maximized Comfort  Outcome: Ongoing (interventions implemented as appropriate)    Goal: Enhanced Quality of Life  Outcome: Ongoing (interventions implemented as appropriate)      Problem: Patient Care Overview  Goal: Plan of Care Review  Outcome: Ongoing (interventions implemented as appropriate)   08/13/19 0540   Coping/Psychosocial   Plan of Care Reviewed With patient;family   Plan of Care Review   Progress declining   OTHER   Outcome Summary Patient is premedicated prior to turns. Maintained comfort measures per palliative care protocol. Granddaughters at bedside. Will continue to monitor vital signs and comfort.     Goal: Individualization and Mutuality  Outcome: Ongoing (interventions implemented as appropriate)    Goal: Discharge Needs Assessment  Outcome: Ongoing (interventions implemented as appropriate)    Goal: Interprofessional Rounds/Family Conf  Outcome: Ongoing (interventions implemented as appropriate)      Problem: Skin Injury Risk (Adult)  Goal: Skin Health and Integrity  Outcome: Ongoing (interventions implemented as appropriate)

## 2019-08-14 NOTE — PROGRESS NOTES
.kcPalliative Care/Hospice Follow Up Note       LOS: 10 days   Patient Care Team:  Rebecca Brower MD as PCP - General (Internal Medicine)  Kimani Peterson MD as Consulting Physician (Hospice and Palliative Medicine)    Chief Complaint:  CHF    Interval History:     Patient Complaints: None   Patient Denies:  None  History taken from: Family; due to actively dying, RN called the family in.    Review of Systems:  As above    Palliative Performance Scale  Palliative Performance Scale Score: 10%  [place] Symptom Assessment System Revised  Pain Score: no pain   ESAS Tiredness Score: Worst possible tiredness  ESAS Nausea Score: No nausea  ESAS Depression Score: No depression  ESAS Anxiety Score: No anxiety  ESAS Drowsiness Score: Worst possible drowsiness  ESAS Lack of Appetite Score: Worst lack of appetite  ESAS Wellbeing Score: unable to assess  ESAS Dyspnea Score: No shortness of breath  ESAS Other Problem Score: unable to assess  ESAS Source of Information: healthcare professional caregiver, family caregiver  ESAS Intervention: medicated/see MAR  ESAS Intervention Response: tolerated    Vital Signs: At the time of my examination:  & RR 32 & BP 88/61.  Temp:  [98.2 °F (36.8 °C)] 98.2 °F (36.8 °C)  Heart Rate:  [0-148] 0  Resp:  [0-32] 0  BP: (0-88)/(0-61) 0/0  Device (Oxygen Therapy): nasal cannulaFlow (L/min):  [2] 2SpO2:  [0 %-82 %] 0 %    Physical Exam:  General Appearance:   Not awake and lying on her left side with agonal respirations    Throat:   No oral lesions, oral mucosa moist   Neck:   No adenopathy, supple, trachea midline   Lungs:     Clear to auscultation, audible rhonchi with agonal respirations     Heart:    Regular rhythm and tachycardia, + murmur   Abdomen:     Occasional bowel sounds, soft and non-tender, non-distended   Extremities:  No significant pretibial edema, no cyanosis   Pulses:   Radial pulses palpable and equal bilaterally          Results Review:     I reviewed the  patient's new clinical results.    Medication Reviewed.    Assessment/Plan       Chronic left-sided congestive heart failure (CMS/HCC)    Hospice care    Aortic stenosis; moderate-severe    Non-rheumatic mitral regurgitation; moderate-severe    Pulmonary hypertension (CMS/HCC)    Closed fracture of right hip requiring operative repair with routine healing; 7/26/2019    Postoperative anemia due to acute blood loss    Oropharyngeal dysphagia    HTN (hypertension)    I reviewed with the patient's family at bedside.  I also reviewed with the patient's RN.  Presently, the patient appears to be actively dying.  The patient has required glycopyrrolate for airway congestion.  The patient has received 5 doses of 1 mg Dilaudid and 4 doses of 1 mg Ativan thus far today.  Continue the same treatment at present for symptom management.    Plan for disposition: DAMARIS Peterson MD  Hospice and Palliative Medicine  08/13/19  8:57 PM

## 2019-08-14 NOTE — DISCHARGE SUMMARY
Discharge As      Date of Admisssion:  8/3/2019  Date of Death:  2019  Time of Death:  3:35 PM    Patient Care Team:  Rebecca Brower MD as PCP - General (Internal Medicine)  Kimani Peterson MD as Consulting Physician (Hospice and Palliative Medicine)    Final Diagnosis:     Chronic left-sided congestive heart failure (CMS/HCC)    Hospice care    Aortic stenosis; moderate-severe    Non-rheumatic mitral regurgitation; moderate-severe    Pulmonary hypertension (CMS/HCC)    Closed fracture of right hip requiring operative repair with routine healing; 2019    Postoperative anemia due to acute blood loss    Oropharyngeal dysphagia    HTN (hypertension)          Hospital Course  Patient was a 89 y.o. female who presented to the emergency department 2019 following a fall.  The patient had a right hip fracture and did undergo femur IM rodding and nailing on 2019.  The patient has a history of moderate-severe aortic stenosis and moderate-severe mitral regurgitation.  The patient was not a surgical candidate or a TAVR candidate.  Dr. Tim Poe reported that the patient was a high risk for surgery.  The patient did survive surgery.  However, postoperatively, she has not done well.  Postoperatively, the patient has had problems with hemoglobin and platelets.  Dr. 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     Prior to hospital admission, the patient was living alone.  The patient is blind.  The patient's son, POA, understands her condition and agrees with hospice evaluation.  Other members of the family are concerned that she is not getting better.  I reviewed with many family members 2019 outlining the patient's multiple comorbidities.     Subsequently, the patient's POA reviewed with hospice and the patient was discharged from acute care and readmitted as a hospice scattered bed on 8/3/2019.    Symptom management was  provided during her hospital stay.  There was some difference of opinion about the patient's care between the son who was the POA, can, and the other son Misael, and his family.  Despite attempts at a care made, the patient continued to have discomfort and declining condition.  The patient did survive surgery but her postop course required more reserved than she had.  Over time, the patient continued to decline.  Increase medicines for symptom management provided.  I saw the patient earlier today and she had agonal respirations.  Nursing had called the patient's family into the hospital due to her declining condition.  Subsequently, I was called that the patient passed away at 1535 hrs.          Kimani Peterson MD  Hospice and Palliative Medicine  08/13/19  9:01 PM

## 2025-07-18 NOTE — PROGRESS NOTES
Palliative Care/Hospice Follow Up Note       LOS: 3 days   Patient Care Team:  Rebecca Brower MD as PCP - General (Internal Medicine)  Kimani Peterson MD as Consulting Physician (Hospice and Palliative Medicine)    Chief Complaint:  CHF    Interval History:     Patient Complaints: None  Patient Denies:  None  History taken from:  Granddaughter and RN    Review of Systems:  As above    Palliative Performance Scale  Palliative Performance Scale Score: 30%  [place] Symptom Assessment System Revised  Pain Score: 9   ESAS Tiredness Score: 6  ESAS Nausea Score: No nausea  ESAS Depression Score: No depression  ESAS Anxiety Score: 1  ESAS Drowsiness Score: 5  ESAS Lack of Appetite Score: 6  ESAS Wellbeing Score: 4  ESAS Dyspnea Score: 2  ESAS Other Problem Score: 9(constipation)  ESAS Source of Information: healthcare professional caregiver, patient, family caregiver  ESAS Intervention: medicated/see MAR  ESAS Intervention Response: tolerated    Vital Signs  Temp:  [97.1 °F (36.2 °C)-98.4 °F (36.9 °C)] 98.4 °F (36.9 °C)  Heart Rate:  [] 114  Resp:  [16-20] 20  BP: (138-170)/(67-68) 170/68  Device (Oxygen Therapy): nasal cannulaFlow (L/min):  [2] 2SpO2:  [95 %-96 %] 95 %    Physical Exam:  General Appearance:   Awake and appears in no acute distress   Throat:   No oral lesions, oral mucosa moist   Neck:   No adenopathy, supple, trachea midline   Lungs:     Clear to auscultation, occasional crackle, diminished, respirations regular    Heart:    Regular rhythm and tachycardia, + murmur   Abdomen:     Occasional bowel sounds, soft and non-tender, non-distended   Extremities:  Right greater than left lower extremity edema, SCDs off, no cyanosis   Pulses:   Radial pulses palpable and equal bilaterally          Results Review:     I reviewed the patient's new clinical results.    Medication Reviewed.    Assessment/Plan       Chronic left-sided congestive heart failure (CMS/HCC)    Hospice care    Aortic stenosis;  moderate-severe    Non-rheumatic mitral regurgitation; moderate-severe    Pulmonary hypertension (CMS/HCC)    Closed fracture of right hip requiring operative repair with routine healing; 7/26/2019    Postoperative anemia due to acute blood loss    Oropharyngeal dysphagia    HTN (hypertension)    I reviewed all with the patient's granddaughter.  I also reviewed with the patient's RN in the room.  Presently, the patient appears more awake and comfortable.  She is listing to her left and she has a neck pillow which she states helps her neck.  The patient was disimpacted earlier.  She has received Dulcolax suppository twice.  Her abdomen is presently softer.    [facility] RN Dickson discussed with Misael in the room.  I reviewed Dickson's note as well as talking with Dickson.  All agreed to schedule doses of morphine and use Ativan only if needed.      Patient has received 4 doses of 2 mg morphine as far today.  Patient has not required any Ativan.    With multiple other people discussing with the family, I did not repeat any other conversations.  My intentions were to the patient and her care.    Plan for disposition: [facility]    Kimani Peterson MD  Hospice and Palliative Medicine  08/06/19  9:29 PM         No

## (undated) DEVICE — STPLR SKIN VISISTAT WD 35CT

## (undated) DEVICE — ADHS SKIN DERMABOND TOP ADVANCED

## (undated) DEVICE — APPL CHLORAPREP W/TINT 26ML ORNG

## (undated) DEVICE — GUIDE PIN 3.2MM X 343MM: Brand: TRIGEN

## (undated) DEVICE — INTERTAN LAG SCREW DRILL: Brand: TRIGEN

## (undated) DEVICE — SYR LUERLOK 20CC BX/50

## (undated) DEVICE — GLV SURG PREMIERPRO ORTHO LTX PF SZ8 BRN

## (undated) DEVICE — DRP C/ARMOR

## (undated) DEVICE — DRSNG SURESITE WNDW 4X4.5

## (undated) DEVICE — 4.0MM LONG AO PILOT DRILL: Brand: TRIGEN

## (undated) DEVICE — NDL SPINE 20G 3 1/2 YEL STRL 1P/U

## (undated) DEVICE — SUT MNCRYL PLS ANTIB UD 4/0 PS2 18IN

## (undated) DEVICE — MAT FLR ABSORBENT LG 4FT 10 2.5FT

## (undated) DEVICE — SOL ISO/ALC RUB 70PCT 4OZ

## (undated) DEVICE — PK HIP PINNING 40

## (undated) DEVICE — GLV SURG SENSICARE GREEN W/ALOE PF LF 8 STRL

## (undated) DEVICE — OPTIFOAM GENTLE SA, POSTOP, 4X8: Brand: MEDLINE

## (undated) DEVICE — ANTIBACTERIAL UNDYED BRAIDED (POLYGLACTIN 910), SYNTHETIC ABSORBABLE SUTURE: Brand: COATED VICRYL

## (undated) DEVICE — 3.0MM X 1000MM BALL TIP GUIDE ROD: Brand: TRIGEN